# Patient Record
Sex: MALE | Race: WHITE | NOT HISPANIC OR LATINO | Employment: OTHER | URBAN - METROPOLITAN AREA
[De-identification: names, ages, dates, MRNs, and addresses within clinical notes are randomized per-mention and may not be internally consistent; named-entity substitution may affect disease eponyms.]

---

## 2017-03-08 RX ORDER — ALPRAZOLAM 0.25 MG/1
TABLET ORAL
Qty: 90 TAB | Refills: 0 | Status: SHIPPED
Start: 2017-03-08 | End: 2017-09-22 | Stop reason: SDUPTHER

## 2017-03-08 RX ORDER — ZOLPIDEM TARTRATE 10 MG/1
TABLET ORAL
Qty: 90 TAB | Refills: 0 | Status: SHIPPED
Start: 2017-03-08 | End: 2017-09-22 | Stop reason: SDUPTHER

## 2017-03-08 RX ORDER — TRAZODONE HYDROCHLORIDE 50 MG/1
TABLET ORAL
Qty: 90 TAB | Refills: 0 | Status: SHIPPED | OUTPATIENT
Start: 2017-03-08 | End: 2017-09-22 | Stop reason: SDUPTHER

## 2017-04-26 ENCOUNTER — HOSPITAL ENCOUNTER (OUTPATIENT)
Dept: RADIOLOGY | Facility: MEDICAL CENTER | Age: 67
End: 2017-04-26

## 2017-04-26 ENCOUNTER — APPOINTMENT (OUTPATIENT)
Dept: RADIOLOGY | Facility: MEDICAL CENTER | Age: 67
DRG: 183 | End: 2017-04-26
Attending: EMERGENCY MEDICINE
Payer: COMMERCIAL

## 2017-04-26 ENCOUNTER — HOSPITAL ENCOUNTER (INPATIENT)
Facility: MEDICAL CENTER | Age: 67
LOS: 3 days | DRG: 183 | End: 2017-04-29
Attending: EMERGENCY MEDICINE | Admitting: SURGERY
Payer: COMMERCIAL

## 2017-04-26 ENCOUNTER — RESOLUTE PROFESSIONAL BILLING HOSPITAL PROF FEE (OUTPATIENT)
Dept: HOSPITALIST | Facility: MEDICAL CENTER | Age: 67
End: 2017-04-26
Payer: COMMERCIAL

## 2017-04-26 PROBLEM — S22.42XD CLOSED FRACTURE OF MULTIPLE RIBS OF LEFT SIDE WITH ROUTINE HEALING: Status: ACTIVE | Noted: 2017-04-26

## 2017-04-26 PROBLEM — E78.00 HYPERCHOLESTEREMIA: Status: ACTIVE | Noted: 2017-04-26
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
## 2017-04-26 PROBLEM — J94.8 HYDROPNEUMOTHORAX: Status: ACTIVE | Noted: 2017-04-26

## 2017-04-26 PROBLEM — T14.90XA TRAUMA: Status: ACTIVE | Noted: 2017-04-26

## 2017-04-26 LAB
ABO GROUP BLD: NORMAL
ABO GROUP BLD: NORMAL
ALBUMIN SERPL BCP-MCNC: 3.9 G/DL (ref 3.2–4.9)
ALBUMIN/GLOB SERPL: 1.2 G/DL
ALP SERPL-CCNC: 86 U/L (ref 30–99)
ALT SERPL-CCNC: 56 U/L (ref 2–50)
ANION GAP SERPL CALC-SCNC: 8 MMOL/L (ref 0–11.9)
APTT PPP: 26 SEC (ref 24.7–36)
AST SERPL-CCNC: 47 U/L (ref 12–45)
BILIRUB SERPL-MCNC: 1 MG/DL (ref 0.1–1.5)
BLD GP AB SCN SERPL QL: NORMAL
BUN SERPL-MCNC: 11 MG/DL (ref 8–22)
CALCIUM SERPL-MCNC: 8.9 MG/DL (ref 8.5–10.5)
CHLORIDE SERPL-SCNC: 93 MMOL/L (ref 96–112)
CO2 SERPL-SCNC: 22 MMOL/L (ref 20–33)
CREAT SERPL-MCNC: 0.57 MG/DL (ref 0.5–1.4)
ERYTHROCYTE [DISTWIDTH] IN BLOOD BY AUTOMATED COUNT: 47 FL (ref 35.9–50)
ETHANOL BLD-MCNC: 0.01 G/DL
GFR SERPL CREATININE-BSD FRML MDRD: >60 ML/MIN/1.73 M 2
GLOBULIN SER CALC-MCNC: 3.2 G/DL (ref 1.9–3.5)
GLUCOSE BLD-MCNC: 99 MG/DL (ref 65–99)
GLUCOSE SERPL-MCNC: 91 MG/DL (ref 65–99)
HCT VFR BLD AUTO: 42.7 % (ref 42–52)
HGB BLD-MCNC: 15 G/DL (ref 14–18)
INR PPP: 0.86 (ref 0.87–1.13)
MCH RBC QN AUTO: 34.9 PG (ref 27–33)
MCHC RBC AUTO-ENTMCNC: 35.1 G/DL (ref 33.7–35.3)
MCV RBC AUTO: 99.3 FL (ref 81.4–97.8)
PLATELET # BLD AUTO: 373 K/UL (ref 164–446)
PMV BLD AUTO: 8.6 FL (ref 9–12.9)
POTASSIUM SERPL-SCNC: 3.9 MMOL/L (ref 3.6–5.5)
PROT SERPL-MCNC: 7.1 G/DL (ref 6–8.2)
PROTHROMBIN TIME: 12 SEC (ref 12–14.6)
RBC # BLD AUTO: 4.3 M/UL (ref 4.7–6.1)
RH BLD: NORMAL
SODIUM SERPL-SCNC: 123 MMOL/L (ref 135–145)
WBC # BLD AUTO: 8.3 K/UL (ref 4.8–10.8)

## 2017-04-26 PROCEDURE — 71260 CT THORAX DX C+: CPT

## 2017-04-26 PROCEDURE — 71010 DX-CHEST-PORTABLE (1 VIEW): CPT

## 2017-04-26 PROCEDURE — 700102 HCHG RX REV CODE 250 W/ 637 OVERRIDE(OP): Performed by: NURSE PRACTITIONER

## 2017-04-26 PROCEDURE — A9270 NON-COVERED ITEM OR SERVICE: HCPCS | Performed by: NURSE PRACTITIONER

## 2017-04-26 PROCEDURE — 99291 CRITICAL CARE FIRST HOUR: CPT

## 2017-04-26 PROCEDURE — C1729 CATH, DRAINAGE: HCPCS | Performed by: SURGERY

## 2017-04-26 PROCEDURE — G0390 TRAUMA RESPONS W/HOSP CRITI: HCPCS

## 2017-04-26 PROCEDURE — 700111 HCHG RX REV CODE 636 W/ 250 OVERRIDE (IP)

## 2017-04-26 PROCEDURE — 36415 COLL VENOUS BLD VENIPUNCTURE: CPT

## 2017-04-26 PROCEDURE — 85027 COMPLETE CBC AUTOMATED: CPT

## 2017-04-26 PROCEDURE — 80307 DRUG TEST PRSMV CHEM ANLYZR: CPT

## 2017-04-26 PROCEDURE — 302220 CHEST TUBE TRAY: Performed by: SURGERY

## 2017-04-26 PROCEDURE — 86901 BLOOD TYPING SEROLOGIC RH(D): CPT

## 2017-04-26 PROCEDURE — 770022 HCHG ROOM/CARE - ICU (200)

## 2017-04-26 PROCEDURE — 0W9B30Z DRAINAGE OF LEFT PLEURAL CAVITY WITH DRAINAGE DEVICE, PERCUTANEOUS APPROACH: ICD-10-PCS | Performed by: SURGERY

## 2017-04-26 PROCEDURE — 700111 HCHG RX REV CODE 636 W/ 250 OVERRIDE (IP): Performed by: NURSE PRACTITIONER

## 2017-04-26 PROCEDURE — 85730 THROMBOPLASTIN TIME PARTIAL: CPT

## 2017-04-26 PROCEDURE — 86900 BLOOD TYPING SEROLOGIC ABO: CPT

## 2017-04-26 PROCEDURE — 86850 RBC ANTIBODY SCREEN: CPT

## 2017-04-26 PROCEDURE — 700102 HCHG RX REV CODE 250 W/ 637 OVERRIDE(OP): Performed by: SURGERY

## 2017-04-26 PROCEDURE — A9270 NON-COVERED ITEM OR SERVICE: HCPCS | Performed by: SURGERY

## 2017-04-26 PROCEDURE — 85610 PROTHROMBIN TIME: CPT

## 2017-04-26 PROCEDURE — 700117 HCHG RX CONTRAST REV CODE 255: Performed by: EMERGENCY MEDICINE

## 2017-04-26 PROCEDURE — 82962 GLUCOSE BLOOD TEST: CPT

## 2017-04-26 PROCEDURE — 700111 HCHG RX REV CODE 636 W/ 250 OVERRIDE (IP): Performed by: SURGERY

## 2017-04-26 PROCEDURE — 80053 COMPREHEN METABOLIC PANEL: CPT

## 2017-04-26 RX ORDER — SIMVASTATIN 20 MG
40 TABLET ORAL EVERY EVENING
Status: DISCONTINUED | OUTPATIENT
Start: 2017-04-26 | End: 2017-04-26

## 2017-04-26 RX ORDER — MIDAZOLAM HYDROCHLORIDE 1 MG/ML
INJECTION INTRAMUSCULAR; INTRAVENOUS
Status: COMPLETED
Start: 2017-04-26 | End: 2017-04-26

## 2017-04-26 RX ORDER — TRAZODONE HYDROCHLORIDE 50 MG/1
50 TABLET ORAL NIGHTLY
Status: DISCONTINUED | OUTPATIENT
Start: 2017-04-26 | End: 2017-04-29 | Stop reason: HOSPADM

## 2017-04-26 RX ORDER — OXYCODONE HYDROCHLORIDE 10 MG/1
10 TABLET ORAL
Status: DISCONTINUED | OUTPATIENT
Start: 2017-04-26 | End: 2017-04-27

## 2017-04-26 RX ORDER — ALPRAZOLAM 0.25 MG/1
0.25 TABLET ORAL 3 TIMES DAILY PRN
COMMUNITY
End: 2017-09-22

## 2017-04-26 RX ORDER — MIDAZOLAM HYDROCHLORIDE 1 MG/ML
1-5 INJECTION INTRAMUSCULAR; INTRAVENOUS ONCE
Status: COMPLETED | OUTPATIENT
Start: 2017-04-26 | End: 2017-04-26

## 2017-04-26 RX ORDER — MORPHINE SULFATE 4 MG/ML
4 INJECTION, SOLUTION INTRAMUSCULAR; INTRAVENOUS
Status: DISCONTINUED | OUTPATIENT
Start: 2017-04-26 | End: 2017-04-27

## 2017-04-26 RX ORDER — CHLORHEXIDINE GLUCONATE ORAL RINSE 1.2 MG/ML
15 SOLUTION DENTAL EVERY 12 HOURS
Status: DISCONTINUED | OUTPATIENT
Start: 2017-04-26 | End: 2017-04-26

## 2017-04-26 RX ORDER — OXYCODONE HYDROCHLORIDE 5 MG/1
5 TABLET ORAL
Status: DISCONTINUED | OUTPATIENT
Start: 2017-04-26 | End: 2017-04-29 | Stop reason: HOSPADM

## 2017-04-26 RX ORDER — ACETAMINOPHEN 325 MG/1
650 TABLET ORAL EVERY 6 HOURS
Status: DISCONTINUED | OUTPATIENT
Start: 2017-04-26 | End: 2017-04-29 | Stop reason: HOSPADM

## 2017-04-26 RX ORDER — LOVASTATIN 20 MG/1
40 TABLET ORAL
Status: DISCONTINUED | OUTPATIENT
Start: 2017-04-26 | End: 2017-04-29 | Stop reason: HOSPADM

## 2017-04-26 RX ORDER — LOVASTATIN 40 MG/1
40 TABLET ORAL NIGHTLY
COMMUNITY
End: 2017-09-22 | Stop reason: SDUPTHER

## 2017-04-26 RX ORDER — ALPRAZOLAM 0.25 MG/1
0.25 TABLET ORAL 3 TIMES DAILY PRN
Status: DISCONTINUED | OUTPATIENT
Start: 2017-04-26 | End: 2017-04-29 | Stop reason: HOSPADM

## 2017-04-26 RX ORDER — ZOLPIDEM TARTRATE 10 MG/1
10 TABLET ORAL NIGHTLY PRN
COMMUNITY
End: 2017-09-22

## 2017-04-26 RX ORDER — DOCUSATE SODIUM 100 MG/1
100 CAPSULE, LIQUID FILLED ORAL 2 TIMES DAILY
Status: DISCONTINUED | OUTPATIENT
Start: 2017-04-26 | End: 2017-04-29 | Stop reason: HOSPADM

## 2017-04-26 RX ORDER — TRAZODONE HYDROCHLORIDE 50 MG/1
50 TABLET ORAL NIGHTLY
COMMUNITY
End: 2017-09-22

## 2017-04-26 RX ORDER — POLYETHYLENE GLYCOL 3350 17 G/17G
1 POWDER, FOR SOLUTION ORAL 2 TIMES DAILY
Status: DISCONTINUED | OUTPATIENT
Start: 2017-04-26 | End: 2017-04-29 | Stop reason: HOSPADM

## 2017-04-26 RX ORDER — AMOXICILLIN 250 MG
1 CAPSULE ORAL NIGHTLY
Status: DISCONTINUED | OUTPATIENT
Start: 2017-04-26 | End: 2017-04-29 | Stop reason: HOSPADM

## 2017-04-26 RX ORDER — IBUPROFEN 800 MG/1
800 TABLET ORAL
Status: DISCONTINUED | OUTPATIENT
Start: 2017-04-26 | End: 2017-04-29 | Stop reason: HOSPADM

## 2017-04-26 RX ORDER — ENEMA 19; 7 G/133ML; G/133ML
1 ENEMA RECTAL
Status: DISCONTINUED | OUTPATIENT
Start: 2017-04-26 | End: 2017-04-29 | Stop reason: HOSPADM

## 2017-04-26 RX ORDER — ZOLPIDEM TARTRATE 5 MG/1
10 TABLET ORAL NIGHTLY PRN
Status: DISCONTINUED | OUTPATIENT
Start: 2017-04-26 | End: 2017-04-29 | Stop reason: HOSPADM

## 2017-04-26 RX ORDER — BISACODYL 10 MG
10 SUPPOSITORY, RECTAL RECTAL
Status: DISCONTINUED | OUTPATIENT
Start: 2017-04-26 | End: 2017-04-29 | Stop reason: HOSPADM

## 2017-04-26 RX ORDER — LOVASTATIN 20 MG/1
40 TABLET ORAL NIGHTLY
COMMUNITY
End: 2017-04-26

## 2017-04-26 RX ORDER — AMOXICILLIN 250 MG
1 CAPSULE ORAL
Status: DISCONTINUED | OUTPATIENT
Start: 2017-04-26 | End: 2017-04-29 | Stop reason: HOSPADM

## 2017-04-26 RX ORDER — ONDANSETRON 2 MG/ML
4 INJECTION INTRAMUSCULAR; INTRAVENOUS EVERY 4 HOURS PRN
Status: DISCONTINUED | OUTPATIENT
Start: 2017-04-26 | End: 2017-04-29 | Stop reason: HOSPADM

## 2017-04-26 RX ADMIN — ACETAMINOPHEN 650 MG: 325 TABLET, FILM COATED ORAL at 23:58

## 2017-04-26 RX ADMIN — MIDAZOLAM 2 MG: 1 INJECTION INTRAMUSCULAR; INTRAVENOUS at 13:54

## 2017-04-26 RX ADMIN — MIDAZOLAM: 1 INJECTION INTRAMUSCULAR; INTRAVENOUS at 14:44

## 2017-04-26 RX ADMIN — FENTANYL CITRATE 75 MCG: 50 INJECTION INTRAMUSCULAR; INTRAVENOUS at 14:07

## 2017-04-26 RX ADMIN — ENOXAPARIN SODIUM 30 MG: 100 INJECTION SUBCUTANEOUS at 15:44

## 2017-04-26 RX ADMIN — IOHEXOL 100 ML: 350 INJECTION, SOLUTION INTRAVENOUS at 12:56

## 2017-04-26 RX ADMIN — STANDARDIZED SENNA CONCENTRATE AND DOCUSATE SODIUM 1 TABLET: 8.6; 5 TABLET, FILM COATED ORAL at 20:22

## 2017-04-26 RX ADMIN — ACETAMINOPHEN 650 MG: 325 TABLET, FILM COATED ORAL at 17:31

## 2017-04-26 RX ADMIN — MIDAZOLAM HYDROCHLORIDE: 1 INJECTION, SOLUTION INTRAMUSCULAR; INTRAVENOUS at 14:43

## 2017-04-26 RX ADMIN — MIDAZOLAM 3 MG: 1 INJECTION INTRAMUSCULAR; INTRAVENOUS at 14:01

## 2017-04-26 RX ADMIN — TRAZODONE HYDROCHLORIDE 50 MG: 50 TABLET ORAL at 20:22

## 2017-04-26 RX ADMIN — ACETAMINOPHEN 650 MG: 325 TABLET, FILM COATED ORAL at 15:38

## 2017-04-26 RX ADMIN — MIDAZOLAM 1 MG: 1 INJECTION INTRAMUSCULAR; INTRAVENOUS at 14:06

## 2017-04-26 RX ADMIN — FENTANYL CITRATE 25 MCG: 50 INJECTION INTRAMUSCULAR; INTRAVENOUS at 13:59

## 2017-04-26 RX ADMIN — FENTANYL CITRATE: 50 INJECTION, SOLUTION INTRAMUSCULAR; INTRAVENOUS at 14:00

## 2017-04-26 RX ADMIN — LOVASTATIN 40 MG: 20 TABLET ORAL at 20:23

## 2017-04-26 RX ADMIN — IBUPROFEN 800 MG: 800 TABLET, FILM COATED ORAL at 17:31

## 2017-04-26 ASSESSMENT — LIFESTYLE VARIABLES
TOTAL SCORE: 4
TOTAL SCORE: 4
EVER_SMOKED: NEVER
ON A TYPICAL DAY WHEN YOU DRINK ALCOHOL HOW MANY DRINKS DO YOU HAVE: 3
EVER HAD A DRINK FIRST THING IN THE MORNING TO STEADY YOUR NERVES TO GET RID OF A HANGOVER: YES
EVER FELT BAD OR GUILTY ABOUT YOUR DRINKING: YES
HOW MANY TIMES IN THE PAST YEAR HAVE YOU HAD 5 OR MORE DRINKS IN A DAY: 10
TOTAL SCORE: 4
EVER_SMOKED: NEVER
DOES PATIENT WANT TO TALK TO SOMEONE ABOUT QUITTING: NO
ALCOHOL_USE: YES
HAVE YOU EVER FELT YOU SHOULD CUT DOWN ON YOUR DRINKING: YES
HAVE PEOPLE ANNOYED YOU BY CRITICIZING YOUR DRINKING: YES
AVERAGE NUMBER OF DAYS PER WEEK YOU HAVE A DRINK CONTAINING ALCOHOL: 7
CONSUMPTION TOTAL: POSITIVE
DOES PATIENT WANT TO STOP DRINKING: YES

## 2017-04-26 ASSESSMENT — PAIN SCALES - GENERAL
PAINLEVEL_OUTOF10: 5
PAINLEVEL_OUTOF10: 0
PAINLEVEL_OUTOF10: 2
PAINLEVEL_OUTOF10: 5
PAINLEVEL_OUTOF10: 2
PAINLEVEL_OUTOF10: 5
PAINLEVEL_OUTOF10: 4
PAINLEVEL_OUTOF10: 5

## 2017-04-26 ASSESSMENT — COPD QUESTIONNAIRES
HAVE YOU SMOKED AT LEAST 100 CIGARETTES IN YOUR ENTIRE LIFE: NO/DON'T KNOW
DO YOU EVER COUGH UP ANY MUCUS OR PHLEGM?: NO/ONLY WITH OCCASIONAL COLDS OR INFECTIONS
DURING THE PAST 4 WEEKS HOW MUCH DID YOU FEEL SHORT OF BREATH: NONE/LITTLE OF THE TIME
COPD SCREENING SCORE: 2

## 2017-04-26 NOTE — CARE PLAN
Problem: Infection  Goal: Will remain free from infection  Intervention: Assess for removal of potential routes of infection, such as IV, central line, intra-arterial or urinary catheters  Patient assessed for removal of invasive equipment. All lines currently necessary, will continue to reassess for potential removal of invasive lines      Problem: Venous Thromboembolism (VTW)/Deep Vein Thrombosis (DVT) Prevention:  Goal: Patient will participate in Venous Thrombosis (VTE)/Deep Vein Thrombosis (DVT)Prevention Measures  Intervention: Ensure patient wears graduated elastic stockings (RANDI hose) and/or SCDs, if ordered, when in bed or chair (Remove at least once per shift for skin check)  Patient educated on VTE prophylaxis while in the ICU. Ensured SCD's applied properly, medicated per MAR and will continue to monitor for venous stasis complications

## 2017-04-26 NOTE — PROGRESS NOTES
Patient picked up from ER with CCT and ACLS RN. On 6L O2 via NC, no complaints of pain A&Ox4. Patient assessed, consents obtained for chest tube placement

## 2017-04-26 NOTE — IP AVS SNAPSHOT
4/29/2017    Mauricio Obando  [address] [facility] [address]    Dear Mauricio:    [facility] wants to ensure your discharge home is safe and you or your loved ones have had all of your questions answered regarding your care after you leave the hospital.    Below is a list of resources and contact information should you have any questions regarding your hospital stay, follow-up instructions, or active medical symptoms.    Questions or Concerns Regarding… Contact   Medical Questions Related to Your Discharge  (7 days a week, 8am-5pm) Contact a Nurse Care Coordinator   [phone]   Medical Questions Not Related to Your Discharge  (24 hours a day / 7 days a week)  Contact the Nurse Health Line   [phone]    Medications or Discharge Instructions Refer to your discharge packet   or contact your [facility] Primary Care Provider   [phone]   Follow-up Appointment(s) Schedule your appointment via [facility]   or contact Scheduling [phone]   Billing Review your statement via [facility]  or contact Billing [phone]   Medical Records Review your records via [facility]   or contact Medical Records [phone]     You may receive a telephone call within two days of discharge. This call is to make certain you understand your discharge instructions and have the opportunity to have any questions answered. You can also easily access your medical information, test results and upcoming appointments via the [facility] free online health management tool. You can learn more and sign up at [employer]. For assistance setting up your [facility] account, please call [phone].    Once again, we want to ensure your discharge home is safe and that you have a clear understanding of any next steps in your care. If you have any questions or concerns, please do not hesitate to contact us, we are here for you. Thank you for choosing [facility] for your healthcare needs.    Sincerely,    Your [facility] Healthcare Team

## 2017-04-26 NOTE — ED NOTES
Pt bib ems from [place].  Chief Complaint   Patient presents with   • Trauma Green     Fall in the shower 4/22/17, CC left flank pain, left sided rib pain     Transferred for hydropnuemo and rib fractures. Pt able to transfer independently.  Trauma eval complete.   Pt to CT.

## 2017-04-26 NOTE — IP AVS SNAPSHOT
CAD Best Access Code: [email or web address]  Expires: 5/29/2017  5:45 PM    Your email address is not on file at [facility].  Email Addresses are required for you to sign up for CAD Best, please contact [phone] to verify your personal information and to provide your email address prior to attempting to register for CAD Best.    Mauricio Obando  [address] [place] [address], [place] [address]    CAD Best  A secure, online tool to manage your health information     [facility]’s CAD Best® is a secure, online tool that connects you to your personalized health information from the privacy of your home -- day or night - making it very easy for you to manage your healthcare. Once the activation process is completed, you can even access your medical information using the CAD Best swetha, which is available for free in the Apple Swetha store or Google Play store.     To learn more about CAD Best, visit [email or web address] Best    There are two levels of access available (as shown below):   My Chart Features  [facility] Primary Care Doctor [facility]  Specialists [facility]  Urgent  Care Non-[facility] Primary Care Doctor   Email your healthcare team securely and privately 24/7 X X X    Manage appointments: schedule your next appointment; view details of past/upcoming appointments X      Request prescription refills. X      View recent personal medical records, including lab and immunizations X X X X   View health record, including health history, allergies, medications X X X X   Read reports about your outpatient visits, procedures, consult and ER notes X X X X   See your discharge summary, which is a recap of your hospital and/or ER visit that includes your diagnosis, lab results, and care plan X X  X     How to register for CAD Best:  Once your e-mail address has been verified, follow the following steps to sign up for CAD Best.     1. Go to  [email or web address]  2. Click on the Sign Up Now box, which takes you to the New Member Sign Up page. You will  need to provide the following information:  a. Enter your t-Art Access Code exactly as it appears at the top of this page. (You will not need to use this code after you’ve completed the sign-up process. If you do not sign up before the expiration date, you must request a new code.)   b. Enter your date of birth.   c. Enter your home email address.   d. Click Submit, and follow the next screen’s instructions.  3. Create a [employer] ID. This will be your t-Art login ID and cannot be changed, so think of one that is secure and easy to remember.  4. Create a t-Art password. You can change your password at any time.  5. Enter your Password Reset Question and Answer. This can be used at a later time if you forget your password.   6. Enter your e-mail address. This allows you to receive e-mail notifications when new information is available in t-Art.  7. Click Sign Up. You can now view your health information.    For assistance activating your t-Art account, call [phone]

## 2017-04-26 NOTE — IP AVS SNAPSHOT
" Home Care Instructions                                                                                                                  Name:Mauricio Obando  Medical Record Number:[id number]  CSN: [id number]    YOB: 1950   Age: 66 y.o.  Sex: male  HT:1.778 m (5' 10\") WT: 74.5 kg (164 lb 3.9 oz)          Admit Date: 4/26/2017     Discharge Date:   Today's Date: 4/29/2017  Attending Doctor:  Wisam Munguia M.D.                  Allergies:  Review of patient's allergies indicates no known allergies.            Discharge Instructions       Discharge Instructions    Discharged to home by car with relative. Discharged via wheelchair, hospital escort: Yes.  Special equipment needed: Not Applicable    Be sure to schedule a follow-up appointment with your primary care doctor or any specialists as instructed.     Discharge Plan:   Diet Plan: Discussed  Activity Level: Discussed  Confirmed Follow up Appointment: Patient to Call and Schedule Appointment  Confirmed Symptoms Management: Discussed  Medication Reconciliation Updated: Yes  Influenza Vaccine Indication: Not indicated: Previously immunized this influenza season and > 8 years of age    I understand that a diet low in cholesterol, fat, and sodium is recommended for good health. Unless I have been given specific instructions below for another diet, I accept this instruction as my diet prescription.   Other diet:     Special Instructions: None    · Is patient discharged on Warfarin / Coumadin?   No     · Is patient Post Blood Transfusion?      The purpose of blood transfusion is to correct anemia, low levels of blood clotting factors or to correct acute blood loss.   Blood transfusion is very safe but occasionally unexpected adverse reactions do occur. Most adverse reactions occur during transfusion, within one to two days following transfusion or one to two weeks following transfusion. Some adverse reactions can occur one to six months after transfusion and some even " years later.             If any of the symptoms listed below happen to you during your transfusion,                                 please notify your nurse immediately.   · Fever or Chills  · Flushing of the Face  · Hives, rash or itching  · Difficulty in breathing or shortness of breath  · Pain or oozing of blood from the IV needle site  · Low back pain  · Nausea or vomiting  · Weakness or fainting  · Chest pain  · Blood in the urine  · Decreased frequency of urination    The above symptoms may also occur within 24 to 48 after transfusion; if so, notify your physician.     · Yellowing of the skin    This can happen one to six months after transfusion; if so, notify your physician    Depression / Suicide Risk    As you are discharged from this [facility] facility, it is important to learn how to keep safe from harming yourself.    Recognize the warning signs:  · Abrupt changes in personality, positive or negative- including increase in energy   · Giving away possessions  · Change in eating patterns- significant weight changes-  positive or negative  · Change in sleeping patterns- unable to sleep or sleeping all the time   · Unwillingness or inability to communicate  · Depression  · Unusual sadness, discouragement and loneliness  · Talk of wanting to die  · Neglect of personal appearance   · Rebelliousness- reckless behavior  · Withdrawal from people/activities they love  · Confusion- inability to concentrate     If you or a loved one observes any of these behaviors or has concerns about self-harm, here's what you can do:  · Talk about it- your feelings and reasons for harming yourself  · Remove any means that you might use to hurt yourself (examples: pills, rope, extension cords, firearm)  · Get professional help from the community (Mental Health, Substance Abuse, psychological counseling)  · Do not be alone:Call your Safe Contact- someone whom you trust who will be there for you.  · Call your local CRISIS  HOTLINE 629-0140 or 379-257-5791  · Call your local Children's Mobile Crisis Response Team [place] (037) 666-7059 or www.[employer]link[employer]  · Call the toll free National Suicide Prevention Hotlines   · National Suicide Prevention Lifeline 435-916-OIJO (1875)  · National Hope Line Network 800-SUICIDE (019-8513)        Follow-up Information     1. Follow up with Wisam Munguia M.D.. Schedule an appointment as soon as possible for a visit in 1 week.    Specialty:  Surgery    Contact information    [address]  [place] [address] [phone]          2. Follow up with Javon Palacios M.D.. Schedule an appointment as soon as possible for a visit in 1 week.    Specialty:  Internal Medicine    Contact information    [address]  [place] [id number] [phone]           Discharge Medication Instructions:    Below are the medications your physician expects you to take upon discharge:    Review all your home medications and newly ordered medications with your doctor and/or pharmacist. Follow medication instructions as directed by your doctor and/or pharmacist.    Please keep your medication list with you and share with your physician.               Medication List      START taking these medications        Instructions    Morning Afternoon Evening Bedtime    acetaminophen 325 MG Tabs   Last time this was given:  650 mg on 4/29/2017  5:06 PM   Commonly known as:  TYLENOL        Take 2 Tabs by mouth every 6 hours.   Dose:  650 mg                        ibuprofen 800 MG Tabs   Last time this was given:  800 mg on 4/29/2017  5:06 PM   Commonly known as:  MOTRIN        Take 1 Tab by mouth 3 times a day, with meals.   Dose:  800 mg                          CONTINUE taking these medications        Instructions    Morning Afternoon Evening Bedtime    alprazolam 0.25 MG Tabs   Commonly known as:  XANAX        Take 0.25 mg by mouth 3 times a day as needed for Sleep or Anxiety.   Dose:  0.25 mg                           AMBIEN 10 MG Tabs   Generic drug:  zolpidem        Take 10 mg by mouth at bedtime as needed for Sleep.   Dose:  10 mg                        lovastatin 40 MG tablet   Last time this was given:  40 mg on 4/28/2017  8:58 PM   Commonly known as:  MEVACOR        Take 40 mg by mouth every evening.   Dose:  40 mg                        trazodone 50 MG Tabs   Last time this was given:  50 mg on 4/28/2017  8:58 PM   Commonly known as:  DESYREL        Take 50 mg by mouth every evening.   Dose:  50 mg                                Instructions           Diet / Nutrition:    Follow any diet instructions given to you by your doctor or the dietician, including how much salt (sodium) you are allowed each day.    If you are overweight, talk to your doctor about a weight reduction plan.    Activity:    Remain physically active following your doctor's instructions about exercise and activity.    Rest often.     Any time you become even a little tired or short of breath, SIT DOWN and rest.    Worsening Symptoms:    Report any of the following signs and symptoms to the doctor's office immediately:    *Pain of jaw, arm, or neck  *Chest pain not relieved by medication                               *Dizziness or loss of consciousness  *Difficulty breathing even when at rest   *More tired than usual                                       *Bleeding drainage or swelling of surgical site  *Swelling of feet, ankles, legs or stomach                 *Fever (>100ºF)  *Pink or blood tinged sputum  *Weight gain (3lbs/day or 5lbs /week)           *Shock from internal defibrillator (if applicable)  *Palpitations or irregular heartbeats                *Cool and/or numb extremities    Stroke Awareness    Common Risk Factors for Stroke include:    Age  Atrial Fibrillation  Carotid Artery Stenosis  Diabetes Mellitus  Excessive alcohol consumption  High blood pressure  Overweight   Physical inactivity  Smoking    Warning signs and symptoms of a  stroke include:    *Sudden numbness or weakness of the face, arm or leg (especially on one side of the body).  *Sudden confusion, trouble speaking or understanding.  *Sudden trouble seeing in one or both eyes.  *Sudden trouble walking, dizziness, loss of balance or coordination.Sudden severe headache with no known cause.    It is very important to get treatment quickly when a stroke occurs. If you experience any of the above warning signs, call 911 immediately.                   Disclaimer         Quit Smoking / Tobacco Use:    I understand the use of any tobacco products increases my chance of suffering from future heart disease or stroke and could cause other illnesses which may shorten my life. Quitting the use of tobacco products is the single most important thing I can do to improve my health. For further information on smoking / tobacco cessation call a Toll Free Quit Line at 1-380.823.4891 (*National Cancer [place]) or 1-575.181.6833 (American Lung Association) or you can access the web based program at www.lungDucatt.org.    [place] Tobacco Users Help Line:  (286) 800-1835       Toll Free: 1-672.911.3291  Quit Tobacco Program [facility] (895)334-0877    Crisis Hotline:    [place] Crisis Hotline:  8-846-PDUBIGB or 1-605.974.6902    [place] Crisis Hotline:    1-399.183.7241 or 421-824-5670    Discharge Survey:   Thank you for choosing [facility]. We hope we did everything we could to make your hospital stay a pleasant one. You may be receiving a phone survey and we would appreciate your time and participation in answering the questions. Your input is very valuable to us in our efforts to improve our service to our patients and their families.        My signature on this form indicates that:    1. I have reviewed and understand the above information.  2. My questions regarding this information have been answered to my satisfaction.  3. I have formulated a plan with my discharge nurse to  obtain my prescribed medications for home.                  Disclaimer         __________________________________                     __________       ________                       Patient Signature                                                 Date                    Time

## 2017-04-26 NOTE — IP AVS SNAPSHOT
" <p align=\"LEFT\"><IMG SRC=\"//[email or web address]$/[email or web address]\" alt=\"Image\" WIDTH=\"50%\" HEIGHT=\"200\" BORDER=\"\"></p>                   Name:Mauricio Obando  Medical Record Number:[id number]  CSN: [id number]    YOB: 1950   Age: 66 y.o.  Sex: male  HT:1.778 m (5' 10\") WT: 74.5 kg (164 lb 3.9 oz)          Admit Date: 4/26/2017     Discharge Date:   Today's Date: 4/29/2017  Attending Doctor:  Wisam Munguia M.D.                  Allergies:  Review of patient's allergies indicates no known allergies.          Follow-up Information     1. Follow up with Wisam Munguia M.D.. Schedule an appointment as soon as possible for a visit in 1 week.    Specialty:  Surgery    Contact information    [address]  [address]  [place] [address] [phone]          2. Follow up with Javon Palacios M.D.. Schedule an appointment as soon as possible for a visit in 1 week.    Specialty:  Internal Medicine    Contact information    [address]  [place] [address] [phone]           Medication List      Take these Medications        Instructions    acetaminophen 325 MG Tabs   Commonly known as:  TYLENOL    Take 2 Tabs by mouth every 6 hours.   Dose:  650 mg       alprazolam 0.25 MG Tabs   Commonly known as:  XANAX    Take 0.25 mg by mouth 3 times a day as needed for Sleep or Anxiety.   Dose:  0.25 mg       AMBIEN 10 MG Tabs   Generic drug:  zolpidem    Take 10 mg by mouth at bedtime as needed for Sleep.   Dose:  10 mg       ibuprofen 800 MG Tabs   Commonly known as:  MOTRIN    Take 1 Tab by mouth 3 times a day, with meals.   Dose:  800 mg       lovastatin 40 MG tablet   Commonly known as:  MEVACOR    Take 40 mg by mouth every evening.   Dose:  40 mg       trazodone 50 MG Tabs   Commonly known as:  DESYREL    Take 50 mg by mouth every evening.   Dose:  50 mg         "

## 2017-04-26 NOTE — ED NOTES
Chief Complaint   Patient presents with   • Trauma Green     Fall in the shower 4/22/17, CC left flank pain, left sided rib pain     BIB [facility]. Pt AAO x4 on arrival. Pt denies pain at rest. Left flank bruising with rib pain. XR completed in trauma, to CT scan on 6L mask.

## 2017-04-26 NOTE — ED NOTES
Med rec complete per patient and calling Darci   Allergies reviewed  No ORAL antibiotics in last 30 days

## 2017-04-26 NOTE — H&P
"TRAUMA HISTORY AND PHYSICAL    CHIEF COMPLAINT: Blunt chest trauma, pneumothorax.     HISTORY OF PRESENT ILLNESS: The patient, Mauricio Obando, is a 66-year-old gentleman who fell in shower several days ago. He struck his left side and noted immediate left posterior chest wall pain. He denies any loss of consciousness. His pain intensified over the ensuing days prompting him to seek evaluation earlier today. CT imaging from the referring facility demonstrated a partial left-sided pneumothorax and multiple posterior rib fractures. He was transported to [facility] in [place], [place] for a definitive trauma evaluation.  The patient was triaged as a Trauma Green Transfer in accordance with established pre hospital protocols. An expeditious primary and secondary survey with required adjuncts was conducted. See Trauma Narrator for full details.    PAST MEDICAL HISTORY:  has no past medical history on file.     PAST SURGICAL HISTORY:  has no past surgical history on file.     ALLERGIES: NKMA.     CURRENT MEDICATIONS:    Home Medications     Reviewed by Rose Ruiz (Pharmacy Tech) on 04/26/17 at 1318  Med List Status: Complete    Medication Last Dose Status    alprazolam (XANAX) 0.25 MG Tab 4/25/2017 Active    lovastatin (MEVACOR) 40 MG tablet 4/25/2017 Active    trazodone (DESYREL) 50 MG Tab 4/25/2017 Active    zolpidem (AMBIEN) 10 MG Tab 4/25/2017 Active              FAMILY HISTORY: family history is not on file.    SOCIAL HISTORY:  reports that he has never smoked. He does not have any smokeless tobacco history on file. He reports that he drinks alcohol. He reports that he does not use illicit drugs.    REVIEW OF SYSTEMS: Unable to be elicited secondary to the acuity of the patient's condition.    PHYSICAL EXAMINATION:     CONSTITUTIONAL:     Vital Signs: Blood pressure 163/102, pulse 103, temperature 36.9 °C (98.5 °F), resp. rate 21, height 1.778 m (5' 10\"), weight 74.5 kg (164 lb 3.9 oz), SpO2 " 91 %.   General Appearance: appears stated age, is in mild distress.  HEENT:    No significant external craniofacial trauma. Pupils equal, regular, react to light and accommodation. Extraocular muscles intact. Ear canals and tympanic membranes are normal. Lips, mouth, and throat are unremarkable. The nares and oropharynx are clear. The midface and jaw are stable. No malocclusion is evident.  NECK:    The cervical spine is supple and nontender. Normal range of motion. The trachea is midline. There is no jugulovenous distention or cervical crepitance.   RESPIRATORY:   Inspection: unlabored respirations, no intercostal retractions or accessory muscle use.   Palpation:  tender to compression on the left posteriorly. The clavicles are nondeformed.   Auscultation: clear to auscultation.  CARDIOVASCULAR:   Auscultation: regular rate and rhythm.   Peripheral Pulses: Normal.   ABDOMEN: Abdomen is soft, nontender, without organomegaly or masses.  GENITOURINARY:   (MALE): normal male external genitalia.  MUSCULOSKELETAL:   No significant angulation deformity or soft tissue injury. The pelvis is stable.    Inspection of back is normal, no tenderness noted.  SKIN:    No cyanosis or pallor.  NEUROLOGIC:    GCS 15. no focal deficits noted, mental status intact, cranial nerves II through XII intact, muscle tone normal, muscle strength normal, sensation is intact.  PSYCHIATRIC:   Does not appear depressed or anxious, oriented to time, place, person, short and long term memory appears intact, judgement and insight appear intact.    LABORATORY VALUES:   Recent Labs      04/26/17   1235   WBC  8.3   RBC  4.30*   HEMOGLOBIN  15.0   HEMATOCRIT  42.7   MCV  99.3*   MCH  34.9*   MCHC  35.1   RDW  47.0   PLATELETCT  373   MPV  8.6*     Recent Labs      04/26/17   1235   SODIUM  123*   POTASSIUM  3.9   CHLORIDE  93*   CO2  22   GLUCOSE  91   BUN  11   CREATININE  0.57   CALCIUM  8.9     Recent Labs      04/26/17   1235   ASTSGOT  47*    ALTSGPT  56*   TBILIRUBIN  1.0   ALKPHOSPHAT  86   GLOBULIN  3.2   INR  0.86*     Recent Labs      04/26/17   1235   APTT  26.0   INR  0.86*        IMAGING:   CT-CHEST,ABDOMEN,PELVIS WITH   Final Result         1. Large left hydropneumothorax with collapsing of the left upper lobe is redemonstrated.      2. Mildly displaced fractures of the left lateral 7th and 8th ribs. Comminuted displaced fractures of the left posterior 8th, 9th, 10th, 11th ribs.      3. Left basilar airspace opacity with air bronchogram could be atelectasis or contusion.      DX-CHEST-PORTABLE (1 VIEW)   Final Result         1. Acute left rib fractures. Large left pneumothorax with collapsing of the left upper lobe.      2. Patchy opacity in the left lung base could relate to atelectasis or contusion.      CRITICAL RESULT : Preliminary findings is known by Dr. HELEN BLANCO in the Emergency Department on 4/26/2017 12:42 PM          IMPRESSION AND PLAN:     Active Hospital Problems    Diagnosis   • Closed fracture of multiple ribs of left side with routine healing [S22.42XD]     Priority: High     Left segmental 8th through 9th rib fractures. Posterior 10th and 11th rib fractures.    Aggressive multimodal pain management and pulmonary hygiene. Serial chest radiographs.        • Hydropneumothorax [J94.8]     Priority: High     Large left hemopneumothorax with partial collapse of the left upper lobe.  Left tube thoracostomy on admission.  Continue chest tube to close suction drainage.     • Hypercholesteremia [E78.00]     Priority: Medium     Chronic condition treated with lovastatin.  Resumed maintenance medication on admission.       • Fall from ground level [W18.30XA]     Priority: Low     Fall in shower on 4 days prior to admission. No loss of consciousness.          DISPOSITION:  Trauma ICU.    ____________________________________   MD JOEY LOPEZ / NTS     DD: 4/26/2017   DT: 12:47 PM

## 2017-04-26 NOTE — DISCHARGE PLANNING
Trauma Green    Spouse arrived for pt. Spouse is Ana Maria Obando, ([phone]). Escorted spouse to SICU waiting area and updated RN of her arrival. Pt was also updated that spouse has arrived and is doing well. Spouse stated she has support from family and will contact them.

## 2017-04-26 NOTE — ED PROVIDER NOTES
ED Provider Note    Scribed for No att. providers found by Miky Sue. 4/26/2017  12:29 PM    Primary care provider: No primary care provider on file.  Means of arrival: EMS  History obtained from: Patient  History limited by: None    CHIEF COMPLAINT  Chief Complaint   Patient presents with   • Trauma Green     Fall in the shower 4/22/17, CC left flank pain, left sided rib pain       [facility] [place]  Snack Mara-Two is a 89 y.o. unknown who presents to the Emergency Department as a trauma green transfer with shortness of breath secondary to rib pain status post mechanical ground level fall 4 days ago. He states that movement worsens his pain. Patient was evaluated at [facility] where he was found to have multiple rib fractures. EMS reports that the patient fell in the shower, impacting his left side on Saturday. En route the patient had diminished lung sounds on the left side and was saturating at 93% on 3L of oxygen. Patient is prescribed Lovastatin 40 mg, Xanax, Trazodone 50 mg, Ambien. He denies chest pain, tobacco use, drug use.    REVIEW OF SYSTEMS  Pertinent negatives include no chest pain. As above, all other systems reviewed and are negative.   See HPI for further details.   C.    PAST MEDICAL HISTORY   None noted    SURGICAL HISTORY  patient denies any surgical history    SOCIAL HISTORY  Social History   Substance Use Topics   • Smoking status: Never Smoker    • Smokeless tobacco: None   • Alcohol Use: Yes      History   Drug Use No       FAMILY HISTORY  History reviewed. No pertinent family history.    CURRENT MEDICATIONS  No current facility-administered medications for this encounter.    Current outpatient prescriptions:   •  zolpidem (AMBIEN) 10 MG Tab, Take 10 mg by mouth at bedtime as needed for Sleep., Disp: , Rfl:   •  lovastatin (MEVACOR) 40 MG tablet, Take 40 mg by mouth every evening., Disp: , Rfl:   •  trazodone (DESYREL) 50 MG Tab, Take 50 mg by mouth every evening., Disp: , Rfl:   •  alprazolam  "(XANAX) 0.25 MG Tab, Take 0.25 mg by mouth 3 times a day as needed for Sleep or Anxiety., Disp: , Rfl:     ALLERGIES  No Known Allergies    PHYSICAL EXAM  VITAL SIGNS: /102 mmHg  Pulse 98  Temp(Src) 37.1 °C (98.8 °F)  Resp 19  Ht 1.778 m (5' 10\")  Wt 74.844 kg (165 lb)  BMI 23.68 kg/m2    Constitutional: Well developed, Well nourished, No acute distress, Non-toxic appearance.   HENT: Normocephalic, Atraumatic, Bilateral external ears normal, Oropharynx is clear mucous membranes are moist. No oral exudates or nasal discharge.   Eyes: Pupils are equal round and reactive, EOMI, Conjunctiva normal, No discharge.   Neck: Normal range of motion, No tenderness, Supple, No stridor. No meningismus.  Lymphatic: No lymphadenopathy noted.   Cardiovascular: Regular rate and rhythm without murmur rub or gallop.  Thorax & Lungs: Clear breath sounds bilaterally without wheezes, rhonchi or rales. Left lateral chest wall tenderness T5-T3. Left flank ecchymosis  Abdomen: Soft non-tender non-distended. There is no rebound or guarding. No organomegaly is appreciated. Bowel sounds are normal. Left flank ecchymosis  Skin: Normal without rash. Left flank ecchymosis  Back: No CVA or spinal tenderness.   Extremities: Intact distal pulses, No edema, No tenderness, No cyanosis, No clubbing. Capillary refill is less than 2 seconds.  Musculoskeletal: Good range of motion in all major joints. No tenderness to palpation or major deformities noted.   Neurologic: Alert & oriented x 3, Normal motor function, Normal sensory function, No focal deficits noted. Reflexes are normal.  Psychiatric: Affect normal, Judgment normal, Mood normal. There is no suicidal ideation or patient reported hallucinations.       DIAGNOSTIC STUDIES / PROCEDURES    LABS  Labs Reviewed   DIAGNOSTIC ALCOHOL - Abnormal; Notable for the following:     Diagnostic Alcohol 0.01 (*)     All other components within normal limits   CBC WITHOUT DIFFERENTIAL - Abnormal; " Notable for the following:     RBC 4.30 (*)     MCV 99.3 (*)     MCH 34.9 (*)     MPV 8.6 (*)     All other components within normal limits   COMP METABOLIC PANEL - Abnormal; Notable for the following:     Sodium 123 (*)     Chloride 93 (*)     AST(SGOT) 47 (*)     ALT(SGPT) 56 (*)     All other components within normal limits   PROTHROMBIN TIME - Abnormal; Notable for the following:     INR 0.86 (*)     All other components within normal limits   COD (ADULT)   COMPONENT CELLULAR   APTT   ABO CONFIRMATION   ESTIMATED GFR   All labs reviewed by me.    RADIOLOGY  CT-CHEST,ABDOMEN,PELVIS WITH   Final Result         1. Large left hydropneumothorax with collapsing of the left upper lobe is redemonstrated.      2. Mildly displaced fractures of the left lateral 7th and 8th ribs. Comminuted displaced fractures of the left posterior 8th, 9th, 10th, 11th ribs.      3. Left basilar airspace opacity with air bronchogram could be atelectasis or contusion.      DX-CHEST-PORTABLE (1 VIEW)   Final Result         1. Acute left rib fractures. Large left pneumothorax with collapsing of the left upper lobe.      2. Patchy opacity in the left lung base could relate to atelectasis or contusion.      CRITICAL RESULT : Preliminary findings is known by Dr. HELEN BLANCO in the Emergency Department on 4/26/2017 12:42 PM      The radiologist's interpretation of all radiological studies have been reviewed by me.    COURSE & MEDICAL DECISION MAKING  Nursing notes, VS, PMSFHx reviewed in chart.    12:29 PM Patient seen and examined at bedside. Discussed the patient's condition and pneumothoraces. Informed him that he will be kept in the hospital for treatment. Patient declines pain medication at this time. Ordered for Xd chest, CT chest/abdomen/pelvis, Component cellular, Diagnostic alcohol, CBC with differential, CMP, Prothrombin time, APTT, COD, ABO confirmation to evaluate. The patient was hypoxic off oxygen and splinting and I had a high  suspicion of significant rib fractures on the left side and possible spleen injury and therefore ordered an scan to rule out life-threatening hemorrhage.    12:35 PM - Consulted with Dr. Munguia (trauma surgeon) who will consult on the patient's case and determine whether or not to place the tube in the ED or in the trauma ICU. The patient's diagnostic alcohol is 0.01. CBC shows no evidence of anemia or leukocytosis. Of concern he has a low sodium at 123. Serum electrolytes are otherwise unremarkable with only mild elevation of AST and ALT. INR is unremarkable    Patient received normal saline wide open in the emergency department. In addition I contacted Dr. Munguia regarding his need for chest tube placement and this was done on a semi-emergent basis in the trauma ICU to relieve his traumatic pneumothorax. Given his flail chest he was in quite a bit of pain but did not require pain medication in the emergency department when he was not moving and was comfortable not receiving pain medication.    Patient has had high blood pressure while in the emergency department, felt likely secondary to medical condition. Counseled patient to monitor blood pressure at home and follow up with primary care physician.      Please note critical care time of 30 minutes was spent in the care of this patient outside of procedure time    DISPOSITION:  Patient will be discharged home in stable condition.    FINAL IMPRESSION  1. Traumatic fall  2. Left pneumothorax  3. Multiple left-sided rib fractures.  4. Hyponatremia  5. Critical care time, 30 minutes       IMiky (Karis), am scribing for, and in the presence of, No att. providers found.    Electronically signed by: Miky Sue (Karis), 4/26/2017    I, No att. providers found personally performed the services described in this documentation, as scribed by Miky Sue in my presence, and it is both accurate and complete.    The note accurately reflects work and  decisions made by me.  Clem Cheng  4/26/2017  6:34 PM

## 2017-04-26 NOTE — PROGRESS NOTES
Dr. Munguia at bedside to place chest tube, medicated per order with versed and fentanyl. VSS and monitored during procedure

## 2017-04-26 NOTE — OP REPORT
DATE OF OPERATION: 4/26/2017     PREOPERATIVE DIAGNOSES:   1. Left rib fractures.  2. Left hemopneumothorax.    POSTOPERATIVE DIAGNOSES:   1. Left rib fractures.  2. Left hemopneumothorax.     PROCEDURES PERFORMED:   1. Left tube thoracostomy.     SURGEON: Wisam Munguia M.D.     ASSISTANT: MOY Ibarra.     INDICATIONS: The patient is a 66 year-old gentleman with blunt chest trauma and a traumatic hemopneumothorax. A  left chest tube was placed in the SICU.      FINDINGS: 250 mL hemothorax. Small air leak.    PROCEDURE: Following implied emergent consent, the patient was properly identified and optimally positioned. A moderate sedation consisting of intravenous fentanyl and midazolam was administered. The left chest wall was widely prepped and draped into a sterile field.     Local anesthetic was used to infiltrate the chest tube site. Multiple intercostal nerve blocks were placed above and below the chest tube site dermatome. A 1-cm transverse incision was made and the subcutaneous tissue spread bluntly. The thoracic cavity was accessed bluntly over the rib and a 28 Fr chest tube placed in a posterior apical orientation and secured to the skin with a 0-Ethibond vertical mattress suture.     The chest tube was connected to closed suction drainage and a sterile dressing was applied. The patient tolerated the procedure well. There were no apparent complications.   ____________________________________   Wisam Munguia M.D.    DD: 4/26/2017  2:22 PM

## 2017-04-26 NOTE — ED NOTES
Pt resting on gurney, 4L NC, able to speak in full sentences  Additional purple top drawn and sent to lab.  Report called to Henry STARK in ICU

## 2017-04-27 ENCOUNTER — APPOINTMENT (OUTPATIENT)
Dept: RADIOLOGY | Facility: MEDICAL CENTER | Age: 67
DRG: 183 | End: 2017-04-27
Attending: NURSE PRACTITIONER
Payer: COMMERCIAL

## 2017-04-27 PROBLEM — J94.2 HEMOPNEUMOTHORAX ON LEFT: Status: ACTIVE | Noted: 2017-04-26

## 2017-04-27 PROBLEM — T14.90XA TRAUMA: Status: ACTIVE | Noted: 2017-04-27

## 2017-04-27 LAB
ALBUMIN SERPL BCP-MCNC: 3.7 G/DL (ref 3.2–4.9)
ALBUMIN/GLOB SERPL: 1.2 G/DL
ALP SERPL-CCNC: 82 U/L (ref 30–99)
ALT SERPL-CCNC: 48 U/L (ref 2–50)
ANION GAP SERPL CALC-SCNC: 7 MMOL/L (ref 0–11.9)
AST SERPL-CCNC: 37 U/L (ref 12–45)
BASOPHILS # BLD AUTO: 0.8 % (ref 0–1.8)
BASOPHILS # BLD: 0.05 K/UL (ref 0–0.12)
BILIRUB SERPL-MCNC: 1.3 MG/DL (ref 0.1–1.5)
BUN SERPL-MCNC: 14 MG/DL (ref 8–22)
CALCIUM SERPL-MCNC: 9 MG/DL (ref 8.5–10.5)
CHLORIDE SERPL-SCNC: 92 MMOL/L (ref 96–112)
CO2 SERPL-SCNC: 22 MMOL/L (ref 20–33)
CREAT SERPL-MCNC: 0.57 MG/DL (ref 0.5–1.4)
EOSINOPHIL # BLD AUTO: 0.2 K/UL (ref 0–0.51)
EOSINOPHIL NFR BLD: 3.1 % (ref 0–6.9)
ERYTHROCYTE [DISTWIDTH] IN BLOOD BY AUTOMATED COUNT: 46.2 FL (ref 35.9–50)
GFR SERPL CREATININE-BSD FRML MDRD: >60 ML/MIN/1.73 M 2
GLOBULIN SER CALC-MCNC: 3 G/DL (ref 1.9–3.5)
GLUCOSE SERPL-MCNC: 87 MG/DL (ref 65–99)
HCT VFR BLD AUTO: 41.9 % (ref 42–52)
HGB BLD-MCNC: 14.8 G/DL (ref 14–18)
IMM GRANULOCYTES # BLD AUTO: 0.11 K/UL (ref 0–0.11)
IMM GRANULOCYTES NFR BLD AUTO: 1.7 % (ref 0–0.9)
LYMPHOCYTES # BLD AUTO: 1.76 K/UL (ref 1–4.8)
LYMPHOCYTES NFR BLD: 27 % (ref 22–41)
MAGNESIUM SERPL-MCNC: 2.1 MG/DL (ref 1.5–2.5)
MCH RBC QN AUTO: 34.8 PG (ref 27–33)
MCHC RBC AUTO-ENTMCNC: 35.3 G/DL (ref 33.7–35.3)
MCV RBC AUTO: 98.6 FL (ref 81.4–97.8)
MONOCYTES # BLD AUTO: 0.91 K/UL (ref 0–0.85)
MONOCYTES NFR BLD AUTO: 14 % (ref 0–13.4)
NEUTROPHILS # BLD AUTO: 3.48 K/UL (ref 1.82–7.42)
NEUTROPHILS NFR BLD: 53.4 % (ref 44–72)
NRBC # BLD AUTO: 0 K/UL
NRBC BLD AUTO-RTO: 0 /100 WBC
PHOSPHATE SERPL-MCNC: 3 MG/DL (ref 2.5–4.5)
PLATELET # BLD AUTO: 376 K/UL (ref 164–446)
PMV BLD AUTO: 8.5 FL (ref 9–12.9)
POTASSIUM SERPL-SCNC: 4.1 MMOL/L (ref 3.6–5.5)
PROT SERPL-MCNC: 6.7 G/DL (ref 6–8.2)
RBC # BLD AUTO: 4.25 M/UL (ref 4.7–6.1)
SODIUM SERPL-SCNC: 121 MMOL/L (ref 135–145)
WBC # BLD AUTO: 6.5 K/UL (ref 4.8–10.8)

## 2017-04-27 PROCEDURE — 99233 SBSQ HOSP IP/OBS HIGH 50: CPT | Performed by: SURGERY

## 2017-04-27 PROCEDURE — 85025 COMPLETE CBC W/AUTO DIFF WBC: CPT

## 2017-04-27 PROCEDURE — 770006 HCHG ROOM/CARE - MED/SURG/GYN SEMI*

## 2017-04-27 PROCEDURE — 700102 HCHG RX REV CODE 250 W/ 637 OVERRIDE(OP): Performed by: NURSE PRACTITIONER

## 2017-04-27 PROCEDURE — 700102 HCHG RX REV CODE 250 W/ 637 OVERRIDE(OP): Performed by: SURGERY

## 2017-04-27 PROCEDURE — 83735 ASSAY OF MAGNESIUM: CPT

## 2017-04-27 PROCEDURE — 700111 HCHG RX REV CODE 636 W/ 250 OVERRIDE (IP): Performed by: NURSE PRACTITIONER

## 2017-04-27 PROCEDURE — A9270 NON-COVERED ITEM OR SERVICE: HCPCS | Performed by: NURSE PRACTITIONER

## 2017-04-27 PROCEDURE — 700112 HCHG RX REV CODE 229: Performed by: NURSE PRACTITIONER

## 2017-04-27 PROCEDURE — 80053 COMPREHEN METABOLIC PANEL: CPT

## 2017-04-27 PROCEDURE — A9270 NON-COVERED ITEM OR SERVICE: HCPCS | Performed by: SURGERY

## 2017-04-27 PROCEDURE — 84100 ASSAY OF PHOSPHORUS: CPT

## 2017-04-27 PROCEDURE — 71010 DX-CHEST-PORTABLE (1 VIEW): CPT

## 2017-04-27 RX ORDER — MORPHINE SULFATE 4 MG/ML
1-2 INJECTION, SOLUTION INTRAMUSCULAR; INTRAVENOUS
Status: DISCONTINUED | OUTPATIENT
Start: 2017-04-27 | End: 2017-04-29 | Stop reason: HOSPADM

## 2017-04-27 RX ADMIN — IBUPROFEN 800 MG: 800 TABLET, FILM COATED ORAL at 07:24

## 2017-04-27 RX ADMIN — LOVASTATIN 40 MG: 20 TABLET ORAL at 21:21

## 2017-04-27 RX ADMIN — POLYETHYLENE GLYCOL 3350 1 PACKET: 17 POWDER, FOR SOLUTION ORAL at 07:24

## 2017-04-27 RX ADMIN — DOCUSATE SODIUM 100 MG: 100 CAPSULE ORAL at 02:46

## 2017-04-27 RX ADMIN — STANDARDIZED SENNA CONCENTRATE AND DOCUSATE SODIUM 1 TABLET: 8.6; 5 TABLET, FILM COATED ORAL at 21:21

## 2017-04-27 RX ADMIN — ACETAMINOPHEN 650 MG: 325 TABLET, FILM COATED ORAL at 06:15

## 2017-04-27 RX ADMIN — ACETAMINOPHEN 650 MG: 325 TABLET, FILM COATED ORAL at 19:53

## 2017-04-27 RX ADMIN — ENOXAPARIN SODIUM 30 MG: 100 INJECTION SUBCUTANEOUS at 07:25

## 2017-04-27 RX ADMIN — ACETAMINOPHEN 650 MG: 325 TABLET, FILM COATED ORAL at 14:50

## 2017-04-27 RX ADMIN — POLYETHYLENE GLYCOL 3350 1 PACKET: 17 POWDER, FOR SOLUTION ORAL at 21:22

## 2017-04-27 RX ADMIN — ENOXAPARIN SODIUM 30 MG: 100 INJECTION SUBCUTANEOUS at 21:21

## 2017-04-27 RX ADMIN — TRAZODONE HYDROCHLORIDE 50 MG: 50 TABLET ORAL at 21:21

## 2017-04-27 RX ADMIN — DOCUSATE SODIUM 100 MG: 100 CAPSULE ORAL at 14:50

## 2017-04-27 RX ADMIN — BISACODYL 10 MG: 10 SUPPOSITORY RECTAL at 16:31

## 2017-04-27 RX ADMIN — MAGNESIUM HYDROXIDE 30 ML: 400 SUSPENSION ORAL at 07:24

## 2017-04-27 RX ADMIN — IBUPROFEN 800 MG: 800 TABLET, FILM COATED ORAL at 14:50

## 2017-04-27 ASSESSMENT — PAIN SCALES - GENERAL
PAINLEVEL_OUTOF10: 2
PAINLEVEL_OUTOF10: 1
PAINLEVEL_OUTOF10: 2

## 2017-04-27 ASSESSMENT — ENCOUNTER SYMPTOMS
DOUBLE VISION: 0
CHILLS: 0
FOCAL WEAKNESS: 0
SHORTNESS OF BREATH: 0
ABDOMINAL PAIN: 0
HALLUCINATIONS: 0
FEVER: 0
HEADACHES: 0

## 2017-04-27 NOTE — CARE PLAN
"Problem: Pain Management  Goal: Pain level will decrease to patient’s comfort goal  Intervention: Follow pain managment plan developed in collaboration with patient and Interdisciplinary Team  Pt c/o pain when coughing and repositioning.  Patient demonstrates understanding of \"splinting\" his chest.  Pain managed with Ibuprofen and Tylenol.       Problem: Respiratory:  Goal: Respiratory status will improve  Intervention: Educate and encourage coughing and deep breathing  Pulling 2500 on IS, strong effort.           "

## 2017-04-27 NOTE — CARE PLAN
Problem: Knowledge Deficit  Goal: Knowledge of disease process/condition, treatment plan, diagnostic tests, and medications will improve  Outcome: PROGRESSING AS EXPECTED  Pt educated on plan of care. He verbalized understanding. A

## 2017-04-27 NOTE — PROGRESS NOTES
Trauma/Surgical ETOH Intervention    Author: Racheal Thomason Date & Time created: 4/27/2017   9:31 AM     Tertiary survey completed, no further findings  RAP score 5, on Lovenox  SBIRT completed    ETOH Screening  CAGE Score: 4  Intervention complete date: 4/27/2017  Patient response to intervention: Drinks several glasses of wine nightly with dinner. Denies tobacco or illicit drug use..   Patient demonstrats understanding of intervention.Plan of care: Declines outpatient resource information.    has not been contacted.Follow up with: PCP  Total ETOH intervention time: 15 - 30 mintues    DVT Prophylaxis - Determining Level of Risk    Risk Assessment Profile  1. Underlying conditions    Does not apply WEIGHT = 0  2. Iatrogenic factors   Does not apply WEIGHT = 0  3. Injury related factors   AIS > 2 for Chest WEIGHT = 2  4. Age   greater than or equal to 60 but less than 75 WEIGHT = 3    Total RAP Score = 5    RAP less than or equal to 5 = Low Risk  RAP 6 - 14 = Moderate Risk  RAP greater than 15 = High Risk    AIS > 2 Chest  Penetration injury or avulsion > 20% blood loss  Hemo or pneumothorax  Major vessel injury: intimal tear, laceration, perforation, puncture  Tracheal or bronchus laceration  Pulmonary contusion, Pneumo or hemomediastinum  Major cardiac contusion < 25% EF    AIS > 2 Abdomen  Penetrating injury or avulsion > 20% blood loss  Major vessel injury: intimal tear, laceration, perforation , puncture  Injury to organ greater than simple laceration or contusion  Greater than a grade 1 or 2 organ injury spleen, liver, etc.     AIS > 2 Head  Scalp laceration or avulsion > 20cm, or blood loss 20% or more  Cerebral or carotid artery, sigmoid, traverse sinuses laceration or thrombosis  Cerebrum, brain stem, cerebellum contusion, hemorrhage, hematoma. To include EDH, SDH, SAH, SHYANN  Skull fractures unless simple, nondisplaced and isolated head injury

## 2017-04-27 NOTE — PROGRESS NOTES
Report called to Christy RN on [facility]SU.  Patient stood and ambulated to [facility]. Transported from S127 to T416-1 accompanied by transport.  Wife at the bedside.

## 2017-04-27 NOTE — PROGRESS NOTES
Hand off report received from MI Oseguera. Pt care assumed at this time. Plan of care, active orders and medications reviewed. I will continue to monitor.

## 2017-04-27 NOTE — PROGRESS NOTES
"  Trauma/Surgical Progress Note    Author: Chandana Hankins Date & Time created: 4/27/2017   8:48 AM     Interval Events:  ETOH   Mild tachycardiaIS   2500 cc spirometry  Chest tube 400  Constipated.  Bowel protocol  hypnatremia     Review of Systems   Constitutional: Negative for fever and chills.   Eyes: Negative for double vision.   Respiratory: Negative for shortness of breath.    Cardiovascular: Positive for chest pain.   Gastrointestinal: Negative for abdominal pain.   Neurological: Negative for focal weakness and headaches.   Psychiatric/Behavioral: Negative for hallucinations.     Hemodynamics:  Blood pressure 163/102, pulse 102, temperature 35.7 °C (96.3 °F), resp. rate 26, height 1.778 m (5' 10\"), weight 74.5 kg (164 lb 3.9 oz), SpO2 95 %.     Respiratory:    Respiration: (!) 26, Pulse Oximetry: 95 %, O2 Daily Delivery Respiratory : Silicone Nasal Cannula  Chest Tube Group 1 (A) Left-Tube Status / Drainage: Draining;Moderate;Red, Chest Tube Group 1 (A) Left-Device: Dry Closed Drainage System;Suction 20 cm Water  Work Of Breathing / Effort: Mild  RUL Breath Sounds: Clear, RML Breath Sounds: Clear;Diminished, RLL Breath Sounds: Diminished, ABAD Breath Sounds: Clear, LLL Breath Sounds: Diminished  Fluids:    Intake/Output Summary (Last 24 hours) at 04/27/17 0848  Last data filed at 04/27/17 0600   Gross per 24 hour   Intake    670 ml   Output   1480 ml   Net   -810 ml     Admit Weight: 74.844 kg (165 lb)  Current Weight: 74.5 kg (164 lb 3.9 oz)    Physical Exam   HENT:   Head: Normocephalic.   Eyes: Pupils are equal, round, and reactive to light.   Cardiovascular: Regular rhythm.    Pulmonary/Chest: No respiratory distress. He has no wheezes.   Abdominal: Soft. There is no tenderness.   Musculoskeletal: He exhibits no edema.   Neurological: GCS eye subscore is 4. GCS verbal subscore is 5. GCS motor subscore is 6.   Skin: He is not diaphoretic.       Medical Decision Making/Problem List:    Active Hospital " Problems    Diagnosis   • Closed fracture of multiple ribs of left side with routine healing [S22.42XD]     Priority: High     Left segmental 8th through 9th rib fractures. Posterior 10th and 11th rib fractures.    Aggressive multimodal pain management and pulmonary hygiene. Serial chest radiographs.       • Hemopneumothorax on left [J94.2]     Priority: High     Large left hemopneumothorax with partial collapse of the left upper lobe.  Left tube thoracostomy on admission.  4/27 - CXR without pneumothorax   - [facility] total output 420 ml / small air leak with cough / continue suction     • Hypercholesteremia [E78.00]     Priority: Medium     Chronic condition treated with lovastatin.  Resumed maintenance medication on admission.     • Fall from ground level [W18.30XA]     Priority: Low     Fall in shower on 4 days prior to admission. No loss of consciousness.     • Trauma [T14.90]     Core Measures & Quality Metrics:  Labs reviewed, Medications reviewed and Radiology images reviewed  Dalton catheter: No Dalton      DVT Prophylaxis: Enoxaparin (Lovenox)  DVT prophylaxis - mechanical: SCDs          GÓMEZ Score  Discussed patient condition with RN, RT, Pharmacy and Dietary.  CRITICAL CARE TIME EXCLUDING PROCEDURES: 33   minutes

## 2017-04-27 NOTE — PROGRESS NOTES
0740:  Patient stood EOB and transferred into chair.  SBA, tolerated well.     0910:  Patient presented in interdisciplinary trauma rounds with Dr. Hankins and medical team.  Plan to transfer out of ICU today.

## 2017-04-28 ENCOUNTER — APPOINTMENT (OUTPATIENT)
Dept: RADIOLOGY | Facility: MEDICAL CENTER | Age: 67
DRG: 183 | End: 2017-04-28
Attending: NURSE PRACTITIONER
Payer: COMMERCIAL

## 2017-04-28 PROBLEM — E87.1 HYPONATREMIA: Status: ACTIVE | Noted: 2017-04-26

## 2017-04-28 LAB
ANION GAP SERPL CALC-SCNC: 8 MMOL/L (ref 0–11.9)
BASOPHILS # BLD AUTO: 0.8 % (ref 0–1.8)
BASOPHILS # BLD: 0.05 K/UL (ref 0–0.12)
BUN SERPL-MCNC: 15 MG/DL (ref 8–22)
CALCIUM SERPL-MCNC: 9.1 MG/DL (ref 8.5–10.5)
CHLORIDE SERPL-SCNC: 94 MMOL/L (ref 96–112)
CO2 SERPL-SCNC: 26 MMOL/L (ref 20–33)
CREAT SERPL-MCNC: 0.63 MG/DL (ref 0.5–1.4)
EOSINOPHIL # BLD AUTO: 0.25 K/UL (ref 0–0.51)
EOSINOPHIL NFR BLD: 4 % (ref 0–6.9)
ERYTHROCYTE [DISTWIDTH] IN BLOOD BY AUTOMATED COUNT: 46.8 FL (ref 35.9–50)
GFR SERPL CREATININE-BSD FRML MDRD: >60 ML/MIN/1.73 M 2
GLUCOSE BLD-MCNC: 85 MG/DL (ref 65–99)
GLUCOSE BLD-MCNC: 89 MG/DL (ref 65–99)
GLUCOSE BLD-MCNC: 96 MG/DL (ref 65–99)
GLUCOSE SERPL-MCNC: 83 MG/DL (ref 65–99)
HCT VFR BLD AUTO: 41.8 % (ref 42–52)
HGB BLD-MCNC: 14.6 G/DL (ref 14–18)
IMM GRANULOCYTES # BLD AUTO: 0.13 K/UL (ref 0–0.11)
IMM GRANULOCYTES NFR BLD AUTO: 2.1 % (ref 0–0.9)
LYMPHOCYTES # BLD AUTO: 1.76 K/UL (ref 1–4.8)
LYMPHOCYTES NFR BLD: 28.1 % (ref 22–41)
MCH RBC QN AUTO: 34.7 PG (ref 27–33)
MCHC RBC AUTO-ENTMCNC: 34.9 G/DL (ref 33.7–35.3)
MCV RBC AUTO: 99.3 FL (ref 81.4–97.8)
MONOCYTES # BLD AUTO: 0.66 K/UL (ref 0–0.85)
MONOCYTES NFR BLD AUTO: 10.5 % (ref 0–13.4)
NEUTROPHILS # BLD AUTO: 3.42 K/UL (ref 1.82–7.42)
NEUTROPHILS NFR BLD: 54.5 % (ref 44–72)
NRBC # BLD AUTO: 0 K/UL
NRBC BLD AUTO-RTO: 0 /100 WBC
PLATELET # BLD AUTO: 405 K/UL (ref 164–446)
PMV BLD AUTO: 8.7 FL (ref 9–12.9)
POTASSIUM SERPL-SCNC: 4.1 MMOL/L (ref 3.6–5.5)
RBC # BLD AUTO: 4.21 M/UL (ref 4.7–6.1)
SODIUM SERPL-SCNC: 128 MMOL/L (ref 135–145)
WBC # BLD AUTO: 6.3 K/UL (ref 4.8–10.8)

## 2017-04-28 PROCEDURE — A9270 NON-COVERED ITEM OR SERVICE: HCPCS | Performed by: SURGERY

## 2017-04-28 PROCEDURE — 770006 HCHG ROOM/CARE - MED/SURG/GYN SEMI*

## 2017-04-28 PROCEDURE — A9270 NON-COVERED ITEM OR SERVICE: HCPCS | Performed by: NURSE PRACTITIONER

## 2017-04-28 PROCEDURE — 80048 BASIC METABOLIC PNL TOTAL CA: CPT

## 2017-04-28 PROCEDURE — 700111 HCHG RX REV CODE 636 W/ 250 OVERRIDE (IP): Performed by: NURSE PRACTITIONER

## 2017-04-28 PROCEDURE — 700112 HCHG RX REV CODE 229: Performed by: NURSE PRACTITIONER

## 2017-04-28 PROCEDURE — 700102 HCHG RX REV CODE 250 W/ 637 OVERRIDE(OP): Performed by: NURSE PRACTITIONER

## 2017-04-28 PROCEDURE — 82962 GLUCOSE BLOOD TEST: CPT

## 2017-04-28 PROCEDURE — 700102 HCHG RX REV CODE 250 W/ 637 OVERRIDE(OP): Performed by: SURGERY

## 2017-04-28 PROCEDURE — 36415 COLL VENOUS BLD VENIPUNCTURE: CPT

## 2017-04-28 PROCEDURE — 85025 COMPLETE CBC W/AUTO DIFF WBC: CPT

## 2017-04-28 PROCEDURE — 71010 DX-CHEST-PORTABLE (1 VIEW): CPT

## 2017-04-28 RX ADMIN — DOCUSATE SODIUM 100 MG: 100 CAPSULE ORAL at 08:03

## 2017-04-28 RX ADMIN — TRAZODONE HYDROCHLORIDE 50 MG: 50 TABLET ORAL at 20:58

## 2017-04-28 RX ADMIN — ACETAMINOPHEN 650 MG: 325 TABLET, FILM COATED ORAL at 18:14

## 2017-04-28 RX ADMIN — IBUPROFEN 800 MG: 800 TABLET, FILM COATED ORAL at 12:57

## 2017-04-28 RX ADMIN — IBUPROFEN 800 MG: 800 TABLET, FILM COATED ORAL at 18:14

## 2017-04-28 RX ADMIN — ENOXAPARIN SODIUM 30 MG: 100 INJECTION SUBCUTANEOUS at 08:06

## 2017-04-28 RX ADMIN — ENOXAPARIN SODIUM 30 MG: 100 INJECTION SUBCUTANEOUS at 20:58

## 2017-04-28 RX ADMIN — STANDARDIZED SENNA CONCENTRATE AND DOCUSATE SODIUM 1 TABLET: 8.6; 5 TABLET, FILM COATED ORAL at 20:57

## 2017-04-28 RX ADMIN — MAGNESIUM HYDROXIDE 30 ML: 400 SUSPENSION ORAL at 08:03

## 2017-04-28 RX ADMIN — BISACODYL 10 MG: 10 SUPPOSITORY RECTAL at 18:14

## 2017-04-28 RX ADMIN — DOCUSATE SODIUM 100 MG: 100 CAPSULE ORAL at 20:57

## 2017-04-28 RX ADMIN — LOVASTATIN 40 MG: 20 TABLET ORAL at 20:58

## 2017-04-28 RX ADMIN — ACETAMINOPHEN 650 MG: 325 TABLET, FILM COATED ORAL at 12:57

## 2017-04-28 RX ADMIN — POLYETHYLENE GLYCOL 3350 1 PACKET: 17 POWDER, FOR SOLUTION ORAL at 08:03

## 2017-04-28 RX ADMIN — IBUPROFEN 800 MG: 800 TABLET, FILM COATED ORAL at 08:03

## 2017-04-28 RX ADMIN — ACETAMINOPHEN 650 MG: 325 TABLET, FILM COATED ORAL at 06:09

## 2017-04-28 ASSESSMENT — ENCOUNTER SYMPTOMS
NEUROLOGICAL NEGATIVE: 1
NECK PAIN: 0
PSYCHIATRIC NEGATIVE: 1
EYES NEGATIVE: 1
MYALGIAS: 1
NAUSEA: 0
VOMITING: 0
ABDOMINAL PAIN: 0
DIZZINESS: 0
BACK PAIN: 1
CONSTITUTIONAL NEGATIVE: 1
RESPIRATORY NEGATIVE: 1
SHORTNESS OF BREATH: 0
FOCAL WEAKNESS: 0
SENSORY CHANGE: 0
CONSTIPATION: 1
COUGH: 0

## 2017-04-28 ASSESSMENT — PAIN SCALES - GENERAL
PAINLEVEL_OUTOF10: 3
PAINLEVEL_OUTOF10: 0
PAINLEVEL_OUTOF10: 0
PAINLEVEL_OUTOF10: 8
PAINLEVEL_OUTOF10: 0

## 2017-04-28 NOTE — PROGRESS NOTES
Pt admitted to the floor at 1510, report was called in by MI Peters from the ICU. Patient is ambulatory and walked from [facility] to bed with 1 assistance, A&Ox 4, was medicated for pain prior to transfer. Chest tube to suction, no respiratory complaints, sob or distress.  Uses IS effectively achieving 2500. No BM 5 days, suppository administered, + results, +BM. At this time the admit profile is complete, assessment complete no changes from ICU RN assessment, pt verbalizes understanding of education of call lights and phone numbers, appropriate signs have been placed.

## 2017-04-28 NOTE — PROGRESS NOTES
Assumed care of patient at 1900.  Bedside report completed.  A&O x4. VSS.  Denies pain intervention at this time.  Tolerating regular diet; denies nausea/vomiting.  L chest tube to suction; no air leak noted; SpO2 95% on 1.5 L NC.  Last BM 4/27/17.  Voiding without difficulty.  Call light and personal belongings within reach.  Bed alarm activated.  No additional needs at this time.

## 2017-04-28 NOTE — PROGRESS NOTES
AO x 4, assisted up to chair with sba.  Sitting up eating breakfast. Chest tube to suction, no leak, no sob or respiratory distress.  IS in use, achieves 2500 today.  Medicated for L flank pain, level 8/10 with movement, states none while at rest.  Chest tube site c/d/i dressing. Plan of care discussed, questions answered, verbalized understanding.

## 2017-04-28 NOTE — PROGRESS NOTES
"  Trauma/Surgical Progress Note    Author: Morenita Cheema Date & Time created: 4/28/2017   8:55 AM     Interval Events:  Up in a chair. Adequate pain control with Tylenol.  Strong respiratory effort - IS 2750 cc.  AM CXR stable, no pneumothorax.  CT output 80 ml in 24 hrs, no air leak - to water seal.  Constipation addressed.    Review of Systems   Constitutional: Negative.    HENT: Negative.    Eyes: Negative.    Respiratory: Negative.  Negative for cough and shortness of breath.    Cardiovascular: Positive for chest pain.        Left rib cage pain - rib fractures   Gastrointestinal: Positive for constipation. Negative for nausea, vomiting and abdominal pain.        BM 4/27  Abdominal fullness, constipation - addressed   Genitourinary: Negative.  Negative for dysuria.        Voiding   Musculoskeletal: Positive for myalgias and back pain. Negative for joint pain and neck pain.   Skin: Negative.    Neurological: Negative.  Negative for dizziness, sensory change and focal weakness.   Psychiatric/Behavioral: Negative.      Hemodynamics:  Blood pressure 128/87, pulse 78, temperature 36.1 °C (97 °F), resp. rate 18, height 1.778 m (5' 10\"), weight 74.5 kg (164 lb 3.9 oz), SpO2 96 %.     Respiratory:    Respiration: 18, Pulse Oximetry: 96 %  Chest Tube Group 1 (A) Left-Tube Status / Drainage: Patent;Small;Serosanguinous, Chest Tube Group 1 (A) Left-Device: Suction 20 cm Water  Work Of Breathing / Effort: Mild  RUL Breath Sounds: Clear, RML Breath Sounds: Clear, RLL Breath Sounds: Diminished, ABAD Breath Sounds: Clear, LLL Breath Sounds: Diminished  Fluids:    Intake/Output Summary (Last 24 hours) at 04/28/17 0855  Last data filed at 04/28/17 0400   Gross per 24 hour   Intake   1260 ml   Output   1070 ml   Net    190 ml     Admit Weight: 74.844 kg (165 lb)  Current      Physical Exam   Constitutional: He is oriented to person, place, and time.   HENT:   Head: Normocephalic.   Eyes: Conjunctivae are normal.   Neck: Neck " supple.   Cardiovascular: Normal rate, regular rhythm and intact distal pulses.    Pulmonary/Chest: Effort normal and breath sounds normal. No respiratory distress.   On room air  IS 2750 cc   Abdominal: Soft. Bowel sounds are normal. He exhibits no distension. There is no tenderness.   Musculoskeletal: Normal range of motion. He exhibits no edema.   Neurological: He is alert and oriented to person, place, and time.   Skin: Skin is warm and dry. He is not diaphoretic.   Psychiatric: He has a normal mood and affect. His behavior is normal. Judgment and thought content normal.       Medical Decision Making/Problem List:    Active Hospital Problems    Diagnosis   • Closed fracture of multiple ribs of left side with routine healing [S22.42XD]     Priority: High     Left segmental 8th through 9th rib fractures. Posterior 10th and 11th rib fractures.    Aggressive multimodal pain management and pulmonary hygiene. Serial chest radiographs.     • Hemopneumothorax on left [J94.2]     Priority: High     Large left hemopneumothorax with partial collapse of the left upper lobe.  Left tube thoracostomy on admission.  4/27 - CXR without pneumothorax   - [facility] total output 500 ml / 80 ml in 24 hrs / no air leak / to water seal     • Hypercholesteremia [E78.00]     Priority: Medium     Chronic condition treated with lovastatin.  Resumed maintenance medication on admission.      • Hyponatremia [E87.1]     Priority: Medium     Likely chronic. Asymptomatic.   4/27 Serum sodium 121.  Monitor.     • Trauma [T14.90]     Priority: Low   • Fall from ground level [W18.30XA]     Priority: Low     Fall in shower on 4 days prior to admission. No loss of consciousness.        Core Measures & Quality Metrics:  Labs reviewed, Medications reviewed and Radiology images reviewed  Dalton catheter: No Dalton      DVT Prophylaxis: Enoxaparin (Lovenox)  DVT prophylaxis - mechanical: SCDs  Ulcer prophylaxis: Not indicated    Assessed for rehab: Patient  returned to prior level of function, rehabilitation not indicated at this time    Total Score: 5  ETOH Screening  CAGE Score: 4  Intervention complete date: 4/27/2017  Patient response to intervention: Drinks several glasses of wine nightly with dinner. Denies tobacco or illicit drug use..   Patient demonstrats understanding of intervention.Plan of care: Declines outpatient resource information.    has not been contacted.Follow up with: PCP  Total ETOH intervention time: 15 - 30 mintues    Discussed patient condition with RN, Patient and trauma surgery Dr Munguia.

## 2017-04-28 NOTE — CARE PLAN
Problem: Safety  Goal: Will remain free from falls  Intervention: Implement fall precautions  Bed alarm activated.  Patient instructed to call before getting out of bed and as needed.  Yellow slipper socks and mobility signs placed.

## 2017-04-29 ENCOUNTER — HOSPITAL ENCOUNTER (INPATIENT)
Dept: RADIOLOGY | Facility: MEDICAL CENTER | Age: 67
DRG: 183 | End: 2017-04-29
Attending: NURSE PRACTITIONER | Admitting: SURGERY
Payer: COMMERCIAL

## 2017-04-29 ENCOUNTER — APPOINTMENT (OUTPATIENT)
Dept: RADIOLOGY | Facility: MEDICAL CENTER | Age: 67
DRG: 183 | End: 2017-04-29
Attending: NURSE PRACTITIONER
Payer: COMMERCIAL

## 2017-04-29 VITALS
BODY MASS INDEX: 23.51 KG/M2 | RESPIRATION RATE: 18 BRPM | SYSTOLIC BLOOD PRESSURE: 132 MMHG | WEIGHT: 164.24 LBS | OXYGEN SATURATION: 95 % | HEART RATE: 84 BPM | TEMPERATURE: 97.6 F | DIASTOLIC BLOOD PRESSURE: 86 MMHG | HEIGHT: 70 IN

## 2017-04-29 LAB
BASOPHILS # BLD AUTO: 0.9 % (ref 0–1.8)
BASOPHILS # BLD: 0.06 K/UL (ref 0–0.12)
EOSINOPHIL # BLD AUTO: 0.22 K/UL (ref 0–0.51)
EOSINOPHIL NFR BLD: 3.4 % (ref 0–6.9)
ERYTHROCYTE [DISTWIDTH] IN BLOOD BY AUTOMATED COUNT: 46.5 FL (ref 35.9–50)
HCT VFR BLD AUTO: 42.6 % (ref 42–52)
HGB BLD-MCNC: 14.8 G/DL (ref 14–18)
IMM GRANULOCYTES # BLD AUTO: 0.14 K/UL (ref 0–0.11)
IMM GRANULOCYTES NFR BLD AUTO: 2.2 % (ref 0–0.9)
LYMPHOCYTES # BLD AUTO: 1.79 K/UL (ref 1–4.8)
LYMPHOCYTES NFR BLD: 27.9 % (ref 22–41)
MCH RBC QN AUTO: 34.7 PG (ref 27–33)
MCHC RBC AUTO-ENTMCNC: 34.7 G/DL (ref 33.7–35.3)
MCV RBC AUTO: 100 FL (ref 81.4–97.8)
MONOCYTES # BLD AUTO: 0.87 K/UL (ref 0–0.85)
MONOCYTES NFR BLD AUTO: 13.6 % (ref 0–13.4)
NEUTROPHILS # BLD AUTO: 3.34 K/UL (ref 1.82–7.42)
NEUTROPHILS NFR BLD: 52 % (ref 44–72)
NRBC # BLD AUTO: 0 K/UL
NRBC BLD AUTO-RTO: 0 /100 WBC
PLATELET # BLD AUTO: 398 K/UL (ref 164–446)
PMV BLD AUTO: 8.6 FL (ref 9–12.9)
RBC # BLD AUTO: 4.26 M/UL (ref 4.7–6.1)
WBC # BLD AUTO: 6.4 K/UL (ref 4.8–10.8)

## 2017-04-29 PROCEDURE — A9270 NON-COVERED ITEM OR SERVICE: HCPCS | Performed by: NURSE PRACTITIONER

## 2017-04-29 PROCEDURE — 700111 HCHG RX REV CODE 636 W/ 250 OVERRIDE (IP): Performed by: NURSE PRACTITIONER

## 2017-04-29 PROCEDURE — 700102 HCHG RX REV CODE 250 W/ 637 OVERRIDE(OP): Performed by: NURSE PRACTITIONER

## 2017-04-29 PROCEDURE — 71010 DX-CHEST-PORTABLE (1 VIEW): CPT

## 2017-04-29 PROCEDURE — 700112 HCHG RX REV CODE 229: Performed by: NURSE PRACTITIONER

## 2017-04-29 PROCEDURE — 36415 COLL VENOUS BLD VENIPUNCTURE: CPT

## 2017-04-29 PROCEDURE — 85025 COMPLETE CBC W/AUTO DIFF WBC: CPT

## 2017-04-29 RX ORDER — ACETAMINOPHEN 325 MG/1
650 TABLET ORAL EVERY 6 HOURS
Qty: 30 TAB | Refills: 0 | COMMUNITY
Start: 2017-04-29 | End: 2020-07-14

## 2017-04-29 RX ORDER — IBUPROFEN 800 MG/1
800 TABLET ORAL
Qty: 30 TAB | COMMUNITY
Start: 2017-04-29 | End: 2017-09-22

## 2017-04-29 RX ADMIN — MAGNESIUM HYDROXIDE 30 ML: 400 SUSPENSION ORAL at 08:10

## 2017-04-29 RX ADMIN — ACETAMINOPHEN 650 MG: 325 TABLET, FILM COATED ORAL at 00:26

## 2017-04-29 RX ADMIN — ENOXAPARIN SODIUM 30 MG: 100 INJECTION SUBCUTANEOUS at 08:10

## 2017-04-29 RX ADMIN — ACETAMINOPHEN 650 MG: 325 TABLET, FILM COATED ORAL at 05:25

## 2017-04-29 RX ADMIN — IBUPROFEN 800 MG: 800 TABLET, FILM COATED ORAL at 17:06

## 2017-04-29 RX ADMIN — IBUPROFEN 800 MG: 800 TABLET, FILM COATED ORAL at 11:20

## 2017-04-29 RX ADMIN — IBUPROFEN 800 MG: 800 TABLET, FILM COATED ORAL at 08:10

## 2017-04-29 RX ADMIN — ACETAMINOPHEN 650 MG: 325 TABLET, FILM COATED ORAL at 11:20

## 2017-04-29 RX ADMIN — ACETAMINOPHEN 650 MG: 325 TABLET, FILM COATED ORAL at 17:06

## 2017-04-29 RX ADMIN — DOCUSATE SODIUM 100 MG: 100 CAPSULE ORAL at 08:10

## 2017-04-29 ASSESSMENT — ENCOUNTER SYMPTOMS
RESPIRATORY NEGATIVE: 1
CONSTIPATION: 1
NEUROLOGICAL NEGATIVE: 1
PSYCHIATRIC NEGATIVE: 1
NECK PAIN: 0
ROS GI COMMENTS: BM 4/29
CONSTITUTIONAL NEGATIVE: 1
EYES NEGATIVE: 1
VOMITING: 0
SHORTNESS OF BREATH: 0
DIZZINESS: 0
FOCAL WEAKNESS: 0
ABDOMINAL PAIN: 0
BACK PAIN: 1
SENSORY CHANGE: 0
NAUSEA: 0
MYALGIAS: 1

## 2017-04-29 ASSESSMENT — PAIN SCALES - GENERAL: PAINLEVEL_OUTOF10: 0

## 2017-04-29 NOTE — PROGRESS NOTES
"Blood pressure 112/78, pulse 85, temperature 36.6 °C (97.8 °F), resp. rate 18, height 1.778 m (5' 10\"), weight 74.5 kg (164 lb 3.9 oz), SpO2 95 %.    Interval removal of left chest tube.  AM chest xray images and report reviewed prior to removal.  Patient tolerated procedure well.    Plan:  Follow up chest xray this afternoon.  Discharge home with family pending results.  Continue pulmonary care and mobilization.  "

## 2017-04-29 NOTE — PROGRESS NOTES
Patient received, report taken at bedside, sitting up in bed, alert and oriented x4 and able to make needs known, denies pain or discomfort at this time, chest tube to water seal, respirations even and unlabored with no s/s of distress, bed in lowest position and call light within reach

## 2017-04-29 NOTE — PROGRESS NOTES
"  Trauma/Surgical Progress Note    Author: Morenita Cheema Date & Time created: 4/29/2017   8:54 AM     Interval Events:  Doing well. Strong respiratory effort. Pain controlled.  CXR stable. Chest tube with minimal output - remove.  Follow up CXR later today.  Anticipating discharge home with family within 24 hrs.    Review of Systems   Constitutional: Negative.    HENT: Negative.    Eyes: Negative.    Respiratory: Negative.  Negative for shortness of breath.    Cardiovascular: Positive for chest pain.        Left rib cage pain - rib fractures   Gastrointestinal: Positive for constipation. Negative for nausea, vomiting and abdominal pain.        BM 4/29   Genitourinary: Negative.  Negative for dysuria.        Voiding   Musculoskeletal: Positive for myalgias and back pain. Negative for joint pain and neck pain.   Skin: Negative.    Neurological: Negative.  Negative for dizziness, sensory change and focal weakness.   Psychiatric/Behavioral: Negative.      Hemodynamics:  Blood pressure 126/86, pulse 83, temperature 36.3 °C (97.3 °F), resp. rate 18, height 1.778 m (5' 10\"), weight 74.5 kg (164 lb 3.9 oz), SpO2 97 %.     Respiratory:    Respiration: 18, Pulse Oximetry: 97 %, O2 Daily Delivery Respiratory : Room Air with O2 Available  Chest Tube Group 1 (A) Left-Tube Status / Drainage: Patent;Small;Serosanguinous, Chest Tube Group 1 (A) Left-Device: Suction 20 cm Water     RUL Breath Sounds: Clear, RML Breath Sounds: Clear, RLL Breath Sounds: Diminished, ABAD Breath Sounds: Clear, LLL Breath Sounds: Diminished  Fluids:    Intake/Output Summary (Last 24 hours) at 04/29/17 0854  Last data filed at 04/29/17 0400   Gross per 24 hour   Intake   1090 ml   Output    445 ml   Net    645 ml     Admit Weight: 74.844 kg (165 lb)  Current      Physical Exam   Constitutional: He is oriented to person, place, and time.   HENT:   Head: Normocephalic.   Eyes: Conjunctivae are normal.   Neck: Neck supple.   Cardiovascular: Normal rate, " regular rhythm and intact distal pulses.    Pulmonary/Chest: Effort normal and breath sounds normal. No respiratory distress.   On room air  IS 3000 cc   Abdominal: Soft. Bowel sounds are normal. He exhibits no distension. There is no tenderness.   Musculoskeletal: Normal range of motion. He exhibits no edema.   Neurological: He is alert and oriented to person, place, and time.   Skin: Skin is warm and dry. He is not diaphoretic.   Psychiatric: He has a normal mood and affect. His behavior is normal. Judgment and thought content normal.       Medical Decision Making/Problem List:    Active Hospital Problems    Diagnosis   • Closed fracture of multiple ribs of left side with routine healing [S22.42XD]     Priority: High     Left segmental 8th through 9th rib fractures. Posterior 10th and 11th rib fractures.    Aggressive multimodal pain management and pulmonary hygiene. Serial chest radiographs.       • Hemopneumothorax on left [J94.2]     Priority: High     Large left hemopneumothorax with partial collapse of the left upper lobe.  Left tube thoracostomy on admission.  4/29 - CXR stable without pneumothorax.   - [facility] total output 530 ml / 30 ml in 24 hrs / no air leak / to water seal     • Hypercholesteremia [E78.00]     Priority: Medium     Chronic condition treated with Lovastatin.  Resumed maintenance medication on admission.      • Hyponatremia [E87.1]     Priority: Medium     Likely chronic. Asymptomatic.   4/27 Serum sodium 121.  4/28 Trend up - 128.  Monitor.     • Trauma [T14.90]     Priority: Low   • Fall from ground level [W18.30XA]     Priority: Low     Fall in shower on 4 days prior to admission. No loss of consciousness.         Core Measures & Quality Metrics:  Labs reviewed, Medications reviewed and Radiology images reviewed  Dalton catheter: No Dalton      DVT Prophylaxis: Enoxaparin (Lovenox)  DVT prophylaxis - mechanical: SCDs  Ulcer prophylaxis: Not indicated    Assessed for rehab: Patient returned  to prior level of function, rehabilitation not indicated at this time    Total Score: 5  ETOH Screening  CAGE Score: 4  Intervention complete date: 4/27/2017  Patient response to intervention: Drinks several glasses of wine nightly with dinner. Denies tobacco or illicit drug use..   Patient demonstrats understanding of intervention.Plan of care: Declines outpatient resource information.    has not been contacted.Follow up with: PCP  Total ETOH intervention time: 15 - 30 mintues    Discussed patient condition with RN, Patient and trauma surgery Dr Munguia.

## 2017-04-29 NOTE — CARE PLAN
Problem: Bowel/Gastric:  Goal: Will not experience complications related to bowel motility  Intervention: Implement interventions to promote bowel evacuation if inadequate bowel movements in past 48 hours  Suppository administered per patient request, awaiting results

## 2017-04-30 ENCOUNTER — PATIENT OUTREACH (OUTPATIENT)
Dept: HEALTH INFORMATION MANAGEMENT | Facility: OTHER | Age: 67
End: 2017-04-30

## 2017-04-30 NOTE — PROGRESS NOTES
"Blood pressure 132/86, pulse 84, temperature 36.4 °C (97.6 °F), resp. rate 18, height 1.778 m (5' 10\"), weight 74.5 kg (164 lb 3.9 oz), SpO2 95 %.    Patient evaluated. Tolerating room air at rest and with activity. Breath sounds clear on auscultation. Reports no dyspnea.   Given prescription for a follow up chest xray to be done prior to his appointment with Dr Munguia.    Patient and family verbalize understanding with discharge and follow up instructions.  He is discharged in good condition to the care of his wife.      "

## 2017-04-30 NOTE — DISCHARGE INSTRUCTIONS
Discharge Instructions    Discharged to home by car with relative. Discharged via wheelchair, hospital escort: Yes.  Special equipment needed: Not Applicable    Be sure to schedule a follow-up appointment with your primary care doctor or any specialists as instructed.     Discharge Plan:   Diet Plan: Discussed  Activity Level: Discussed  Confirmed Follow up Appointment: Patient to Call and Schedule Appointment  Confirmed Symptoms Management: Discussed  Medication Reconciliation Updated: Yes  Influenza Vaccine Indication: Not indicated: Previously immunized this influenza season and > 8 years of age    I understand that a diet low in cholesterol, fat, and sodium is recommended for good health. Unless I have been given specific instructions below for another diet, I accept this instruction as my diet prescription.   Other diet:     Special Instructions: None    · Is patient discharged on Warfarin / Coumadin?   No     · Is patient Post Blood Transfusion?      The purpose of blood transfusion is to correct anemia, low levels of blood clotting factors or to correct acute blood loss.   Blood transfusion is very safe but occasionally unexpected adverse reactions do occur. Most adverse reactions occur during transfusion, within one to two days following transfusion or one to two weeks following transfusion. Some adverse reactions can occur one to six months after transfusion and some even years later.             If any of the symptoms listed below happen to you during your transfusion,                                 please notify your nurse immediately.   · Fever or Chills  · Flushing of the Face  · Hives, rash or itching  · Difficulty in breathing or shortness of breath  · Pain or oozing of blood from the IV needle site  · Low back pain  · Nausea or vomiting  · Weakness or fainting  · Chest pain  · Blood in the urine  · Decreased frequency of urination    The above symptoms may also occur within 24 to 48 after  transfusion; if so, notify your physician.     · Yellowing of the skin    This can happen one to six months after transfusion; if so, notify your physician    Depression / Suicide Risk    As you are discharged from this [facility] facility, it is important to learn how to keep safe from harming yourself.    Recognize the warning signs:  · Abrupt changes in personality, positive or negative- including increase in energy   · Giving away possessions  · Change in eating patterns- significant weight changes-  positive or negative  · Change in sleeping patterns- unable to sleep or sleeping all the time   · Unwillingness or inability to communicate  · Depression  · Unusual sadness, discouragement and loneliness  · Talk of wanting to die  · Neglect of personal appearance   · Rebelliousness- reckless behavior  · Withdrawal from people/activities they love  · Confusion- inability to concentrate     If you or a loved one observes any of these behaviors or has concerns about self-harm, here's what you can do:  · Talk about it- your feelings and reasons for harming yourself  · Remove any means that you might use to hurt yourself (examples: pills, rope, extension cords, firearm)  · Get professional help from the community (Mental Health, Substance Abuse, psychological counseling)  · Do not be alone:Call your Safe Contact- someone whom you trust who will be there for you.  · Call your local CRISIS HOTLINE 632-8913 or 019-763-1338  · Call your local Children's Mobile Crisis Response Team [place] (683) 833-0623 or www.[employer]  · Call the toll free National Suicide Prevention Hotlines   · National Suicide Prevention Lifeline 556-196-TMLF (9206)  · National Hope Line Network 800-SUICIDE (050-7329)

## 2017-05-02 ENCOUNTER — HOSPITAL ENCOUNTER (OUTPATIENT)
Dept: RADIOLOGY | Facility: MEDICAL CENTER | Age: 67
End: 2017-05-02
Attending: NURSE PRACTITIONER
Payer: COMMERCIAL

## 2017-05-02 DIAGNOSIS — J94.2 HEMOTHORAX: ICD-10-CM

## 2017-05-02 DIAGNOSIS — S22.42XD MULTIPLE CLOSED FRACTURES OF RIBS OF LEFT SIDE WITH ROUTINE HEALING: ICD-10-CM

## 2017-05-02 PROCEDURE — 71020 DX-CHEST-2 VIEWS: CPT

## 2017-05-07 NOTE — DISCHARGE SUMMARY
Trauma Discharge Summary    DATE OF ADMISSION: 04/26/2017    DATE OF DISCHARGE: 04/29/2017    LENGTH OF STAY: Three days    ATTENDING PHYSICIAN: Wisam Munguia MD, Trauma Critical Care    CONSULTING PHYSICIAN: No consultations during this hospital course    DISCHARGE DIAGNOSIS:  1. Closed fracture of multiple ribs of left side with routine healing  2. Hemopneumothorax on left  3. Hypercholesteremia  4. Hyponatremia  5. Trauma - ground level fall    PROCEDURES:  1. Procedure completed by Dr. Munguia on 04/26/2017: Left tube thoracostomy.     HISTORY OF PRESENT ILLNESS: The patient is a 66 year old male who fell in shower several days prior to his evaluation in a hospital. He struck his left posterior chest. He denies loss of consciousness. His pain worsened over the ensuing days prompting him to seek medical attention. CT imaging from the referring facility demonstrated a partial left-sided pneumothorax and multiple posterior rib fractures. He was transported to [facility] for a definitive trauma care.  The patient was triaged as a Trauma [facility] Transfer in accordance with established pre hospital protocols.    HOSPITAL COURSE: On arrival, the patient underwent extensive radiographic and laboratory studies and was admitted to the critical care team under the direction and supervision of Dr. Munguia. He sustained the above injuries and underwent the above procedure during his stay.  The patient sustained left segmental 8th through 9th rib fractures and posterior 10th and 11th rib fractures.    Aggressive multimodal pain management and pulmonary hygiene was implemented. Patient's status was monitored with serial chest radiographs.    The patient also sustained large left hemopneumothorax with partial collapse of the left upper lobe.  Left tube thoracostomy was performed by Dr Munguia on admission. Serial radiographs demonstrated resolution of pneumothorax and hemothorax and the chest tube was  subsequently removed on 4/29.   The patient has a history of hypercholesteremia treated with Lovastatin. His home medication was resumed on admission.   Hyponatremia noted on admission was likely chronic in nature and asymptomatic. Gentle replacement with intravenous fluids was implemented with serum sodium trending up. The patient remained asymptomatic during his hospital stay.   Tertiary survey was completed without further findings. RAP score and SBIRT were completed during this admission.   On the day of discharge the patient is tolerating room air without dyspnea and with oxygen saturation >93%. He reports adequate pain control. His previous chest tube site is with dressing that will remain in place for next 48 hrs. He is given a prescription for a follow up chest xray. The patient and his wife verbalize understanding with discharge and follow up instructions.     DISCHARGE PHYSICAL EXAM: See [facility] physical exam dated 04/29/2017    DISCHARGE MEDICATIONS: Unable to review controlled substance history as the patient has no data   1. Tylenol 325 mg tab, take two tabs every 6 hrs as needed for pain. OTC  2. Motrin 800 mg tab, take one tab every 6 hrs as needed for pain, OTC  3. Xanax 0.25 mg tab, take one tab 3 times a day as needed for anxiety, resume  4. Desyrel 50 mg tab, take one tab every evening, resume  5. Lovastatin 40 mg tab, take one tab every evening, resume  6. Ambien 10 mg tab, take one tab at bedtime, resume    DISPOSITION: The patient will be discharged home under the care and supervision of his family on 04/29/2017. He will follow up with Dr. Munguia in one week with a follow up chest xray. The patient and family have been extensively counseled and all questions have been answered. Special attention was paid to respiratory difficulty, shortness of breath, sudden onset of chest pain, increasing pain uncontrolled with medication, drainage or bleeding from chest tube site, fever, and signs and symptoms of  infection and to seek immediate medical attention if these develop. The patient and family demonstrate understanding and give verbal compliance with discharge instructions.    TIME SPENT ON DISCHARGE: 40 minutes    ____________________________________  Wisam Munguia MD, Trauma Critical Care    ____________________________________  SONIA Reddy

## 2017-09-15 LAB
ALBUMIN SERPL-MCNC: 4.4 G/DL (ref 3.6–4.8)
ALBUMIN/GLOB SERPL: 1.5 {RATIO} (ref 1.2–2.2)
ALP SERPL-CCNC: 83 IU/L (ref 39–117)
ALT SERPL-CCNC: 55 IU/L (ref 0–44)
AST SERPL-CCNC: 48 IU/L (ref 0–40)
BASOPHILS # BLD AUTO: 0 X10E3/UL (ref 0–0.2)
BASOPHILS NFR BLD AUTO: 0 %
BILIRUB SERPL-MCNC: 0.7 MG/DL (ref 0–1.2)
BUN SERPL-MCNC: 10 MG/DL (ref 8–27)
BUN/CREAT SERPL: 14 (ref 10–24)
CALCIUM SERPL-MCNC: 9.5 MG/DL (ref 8.6–10.2)
CHLORIDE SERPL-SCNC: 96 MMOL/L (ref 96–106)
CHOLEST SERPL-MCNC: 207 MG/DL (ref 100–199)
CO2 SERPL-SCNC: 20 MMOL/L (ref 18–29)
COMMENT 011824: ABNORMAL
CREAT SERPL-MCNC: 0.69 MG/DL (ref 0.76–1.27)
EOSINOPHIL # BLD AUTO: 0.1 X10E3/UL (ref 0–0.4)
EOSINOPHIL NFR BLD AUTO: 2 %
ERYTHROCYTE [DISTWIDTH] IN BLOOD BY AUTOMATED COUNT: 13.3 % (ref 12.3–15.4)
GLOBULIN SER CALC-MCNC: 3 G/DL (ref 1.5–4.5)
GLUCOSE SERPL-MCNC: 92 MG/DL (ref 65–99)
HCT VFR BLD AUTO: 45.3 % (ref 37.5–51)
HDLC SERPL-MCNC: 73 MG/DL
HGB BLD-MCNC: 16.2 G/DL (ref 12.6–17.7)
IMM GRANULOCYTES # BLD: 0 X10E3/UL (ref 0–0.1)
IMM GRANULOCYTES NFR BLD: 0 %
IMMATURE CELLS  115398: ABNORMAL
LDLC SERPL CALC-MCNC: 120 MG/DL (ref 0–99)
LYMPHOCYTES # BLD AUTO: 2.1 X10E3/UL (ref 0.7–3.1)
LYMPHOCYTES NFR BLD AUTO: 40 %
MCH RBC QN AUTO: 34.5 PG (ref 26.6–33)
MCHC RBC AUTO-ENTMCNC: 35.8 G/DL (ref 31.5–35.7)
MCV RBC AUTO: 97 FL (ref 79–97)
MONOCYTES # BLD AUTO: 0.8 X10E3/UL (ref 0.1–0.9)
MONOCYTES NFR BLD AUTO: 16 %
MORPHOLOGY BLD-IMP: ABNORMAL
NEUTROPHILS # BLD AUTO: 2.2 X10E3/UL (ref 1.4–7)
NEUTROPHILS NFR BLD AUTO: 42 %
NRBC BLD AUTO-RTO: ABNORMAL %
PLATELET # BLD AUTO: 375 X10E3/UL (ref 150–379)
POTASSIUM SERPL-SCNC: 4.6 MMOL/L (ref 3.5–5.2)
PROT SERPL-MCNC: 7.4 G/DL (ref 6–8.5)
RBC # BLD AUTO: 4.69 X10E6/UL (ref 4.14–5.8)
SODIUM SERPL-SCNC: 133 MMOL/L (ref 134–144)
TRIGL SERPL-MCNC: 70 MG/DL (ref 0–149)
VIT B12 SERPL-MCNC: 599 PG/ML (ref 211–946)
VLDLC SERPL CALC-MCNC: 14 MG/DL (ref 5–40)
WBC # BLD AUTO: 5.3 X10E3/UL (ref 3.4–10.8)

## 2017-09-22 ENCOUNTER — OFFICE VISIT (OUTPATIENT)
Dept: MEDICAL GROUP | Age: 67
End: 2017-09-22
Payer: COMMERCIAL

## 2017-09-22 VITALS
BODY MASS INDEX: 23.77 KG/M2 | TEMPERATURE: 97.2 F | DIASTOLIC BLOOD PRESSURE: 80 MMHG | SYSTOLIC BLOOD PRESSURE: 128 MMHG | OXYGEN SATURATION: 93 % | WEIGHT: 166 LBS | HEIGHT: 70 IN | HEART RATE: 109 BPM

## 2017-09-22 DIAGNOSIS — E78.2 MIXED HYPERLIPIDEMIA: ICD-10-CM

## 2017-09-22 DIAGNOSIS — Z23 NEED FOR SHINGLES VACCINE: ICD-10-CM

## 2017-09-22 DIAGNOSIS — F41.9 ANXIETY: ICD-10-CM

## 2017-09-22 DIAGNOSIS — E53.8 VITAMIN B 12 DEFICIENCY: ICD-10-CM

## 2017-09-22 DIAGNOSIS — F51.01 PRIMARY INSOMNIA: ICD-10-CM

## 2017-09-22 DIAGNOSIS — Z23 NEED FOR VACCINATION: ICD-10-CM
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
## 2017-09-22 PROBLEM — S22.42XD CLOSED FRACTURE OF MULTIPLE RIBS OF LEFT SIDE WITH ROUTINE HEALING: Status: RESOLVED | Noted: 2017-04-26 | Resolved: 2017-09-22

## 2017-09-22 PROBLEM — J94.2 HEMOPNEUMOTHORAX ON LEFT: Status: RESOLVED | Noted: 2017-04-26 | Resolved: 2017-09-22

## 2017-09-22 PROBLEM — E87.1 HYPONATREMIA: Status: RESOLVED | Noted: 2017-04-26 | Resolved: 2017-09-22

## 2017-09-22 PROBLEM — T14.90XA TRAUMA: Status: RESOLVED | Noted: 2017-04-27 | Resolved: 2017-09-22

## 2017-09-22 PROCEDURE — 90471 IMMUNIZATION ADMIN: CPT | Performed by: INTERNAL MEDICINE

## 2017-09-22 PROCEDURE — 99214 OFFICE O/P EST MOD 30 MIN: CPT | Mod: 25 | Performed by: INTERNAL MEDICINE

## 2017-09-22 PROCEDURE — 90662 IIV NO PRSV INCREASED AG IM: CPT | Performed by: INTERNAL MEDICINE

## 2017-09-22 PROCEDURE — 90472 IMMUNIZATION ADMIN EACH ADD: CPT | Performed by: INTERNAL MEDICINE

## 2017-09-22 PROCEDURE — 90670 PCV13 VACCINE IM: CPT | Performed by: INTERNAL MEDICINE

## 2017-09-22 RX ORDER — LOVASTATIN 40 MG/1
40 TABLET ORAL DAILY
Qty: 90 TAB | Refills: 4 | Status: SHIPPED | OUTPATIENT
Start: 2017-09-22 | End: 2017-09-22

## 2017-09-22 RX ORDER — ALPRAZOLAM 0.25 MG/1
TABLET ORAL
Qty: 90 TAB | Refills: 0 | Status: SHIPPED | OUTPATIENT
Start: 2017-09-22 | End: 2017-12-27 | Stop reason: SDUPTHER

## 2017-09-22 RX ORDER — TRAZODONE HYDROCHLORIDE 50 MG/1
TABLET ORAL
Qty: 90 TAB | Refills: 4 | Status: SHIPPED | OUTPATIENT
Start: 2017-09-22 | End: 2017-09-22 | Stop reason: SDUPTHER

## 2017-09-22 RX ORDER — LOVASTATIN 40 MG/1
40 TABLET ORAL DAILY
Qty: 90 TAB | Refills: 4 | Status: SHIPPED | OUTPATIENT
Start: 2017-09-22 | End: 2018-09-20 | Stop reason: SDUPTHER

## 2017-09-22 RX ORDER — TRAZODONE HYDROCHLORIDE 50 MG/1
TABLET ORAL
Qty: 90 TAB | Refills: 4 | Status: SHIPPED | OUTPATIENT
Start: 2017-09-22 | End: 2018-09-20 | Stop reason: SDUPTHER

## 2017-09-22 RX ORDER — ZOLPIDEM TARTRATE 10 MG/1
TABLET ORAL
Qty: 90 TAB | Refills: 0 | Status: SHIPPED | OUTPATIENT
Start: 2017-09-22 | End: 2018-05-04 | Stop reason: SDUPTHER

## 2017-09-22 ASSESSMENT — ENCOUNTER SYMPTOMS
MUSCULOSKELETAL NEGATIVE: 1
RESPIRATORY NEGATIVE: 1
CARDIOVASCULAR NEGATIVE: 1
GASTROINTESTINAL NEGATIVE: 1
CONSTITUTIONAL NEGATIVE: 1
PSYCHIATRIC NEGATIVE: 1
EYES NEGATIVE: 1
NEUROLOGICAL NEGATIVE: 1

## 2017-09-22 ASSESSMENT — PATIENT HEALTH QUESTIONNAIRE - PHQ9
5. POOR APPETITE OR OVEREATING: 0 - NOT AT ALL
CLINICAL INTERPRETATION OF PHQ2 SCORE: 6
SUM OF ALL RESPONSES TO PHQ QUESTIONS 1-9: 9

## 2017-09-22 NOTE — PROGRESS NOTES
Subjective:      Mauricio Obando is a 67 y.o. male who presents with Annual Exam    The patient is here for followup of chronic medical problems listed below. The patient is compliant with medications and having no side effects from them. Denies chest pain, abdominal pain, dyspnea, myalgias, or cough.   Patient Active Problem List    Diagnosis Date Noted   • Hypercholesteremia 04/26/2017     Priority: Medium   • Vitamin B 12 deficiency 08/12/2016   • Anxiety 08/02/2013   • Mixed hyperlipidemia 05/01/2012   • Insomnia 05/01/2012     No Known Allergies  Outpatient Medications Prior to Visit   Medication Sig Dispense Refill   • cyanocobalamin (VITAMIN B12) 1000 MCG Tab Take 1 Tab by mouth every day. 100 Tab 4   • acetaminophen (TYLENOL) 325 MG Tab Take 2 Tabs by mouth every 6 hours. 30 Tab 0   • ibuprofen (MOTRIN) 800 MG Tab Take 1 Tab by mouth 3 times a day, with meals. 30 Tab    • zolpidem (AMBIEN) 10 MG Tab Take 10 mg by mouth at bedtime as needed for Sleep.     • lovastatin (MEVACOR) 40 MG tablet Take 40 mg by mouth every evening.     • trazodone (DESYREL) 50 MG Tab Take 50 mg by mouth every evening.     • alprazolam (XANAX) 0.25 MG Tab Take 0.25 mg by mouth 3 times a day as needed for Sleep or Anxiety.     • zolpidem (AMBIEN) 10 MG Tab TAKE 1 TABLET BY MOUTH AT BEDTIME AS NEEDED FOR SLEEP 90 Tab 0   • alprazolam (XANAX) 0.25 MG Tab TAKE 1 TABLET BY MOUTH THREE TIMES A DAY AS NEEDED 90 Tab 0   • trazodone (DESYREL) 50 MG Tab TAKE 1 TABLET BY MOUTH EVERY NIGHT AT BEDTIME 90 Tab 0   • lovastatin (MEVACOR) 40 MG tablet Take 1 Tab by mouth every day. 90 Tab 4   • NON SPECIFIED Shingles vaccine 1 Each 0     No facility-administered medications prior to visit.              HPI    Review of Systems   Constitutional: Negative.    HENT: Negative.    Eyes: Negative.    Respiratory: Negative.    Cardiovascular: Negative.    Gastrointestinal: Negative.    Genitourinary: Negative.    Musculoskeletal: Negative.    Skin: Negative.   "  Neurological: Negative.    Endo/Heme/Allergies: Negative.    Psychiatric/Behavioral: Negative.           Objective:     /80   Pulse (!) 109   Temp 36.2 °C (97.2 °F)   Ht 1.778 m (5' 10\")   Wt 75.3 kg (166 lb)   SpO2 93%   BMI 23.82 kg/m²      Physical Exam   Constitutional: He is oriented to person, place, and time. He appears well-developed and well-nourished. No distress.   HENT:   Head: Normocephalic and atraumatic.   Right Ear: External ear normal.   Left Ear: External ear normal.   Nose: Nose normal.   Mouth/Throat: Oropharynx is clear and moist. No oropharyngeal exudate.   Eyes: Conjunctivae and EOM are normal. Pupils are equal, round, and reactive to light. Right eye exhibits no discharge. Left eye exhibits no discharge. No scleral icterus.   Neck: Normal range of motion. Neck supple. No JVD present. No tracheal deviation present. No thyromegaly present.   Cardiovascular: Normal rate, regular rhythm, normal heart sounds and intact distal pulses.  Exam reveals no gallop and no friction rub.    No murmur heard.  Pulmonary/Chest: Effort normal and breath sounds normal. No stridor. No respiratory distress. He has no wheezes. He has no rales. He exhibits no tenderness.   Abdominal: Soft. Bowel sounds are normal. He exhibits no distension and no mass. There is no tenderness. There is no rebound and no guarding.   Musculoskeletal: Normal range of motion. He exhibits no edema or tenderness.   Lymphadenopathy:     He has no cervical adenopathy.   Neurological: He is alert and oriented to person, place, and time. He has normal reflexes. He displays normal reflexes. He exhibits normal muscle tone. Coordination normal.   Skin: Skin is warm and dry. No rash noted. He is not diaphoretic. No erythema. No pallor.   Psychiatric: He has a normal mood and affect. His behavior is normal. Judgment and thought content normal.     Orders Only on 09/14/2017   Component Date Value   • WBC 09/15/2017 5.3    • RBC " 09/15/2017 4.69    • Hemoglobin 09/15/2017 16.2    • Hematocrit 09/15/2017 45.3    • MCV 09/15/2017 97    • MCH 09/15/2017 34.5*   • MCHC 09/15/2017 35.8*   • RDW 09/15/2017 13.3    • Platelet Count 09/15/2017 375    • Neutrophils-Polys 09/15/2017 42    • Lymphocytes 09/15/2017 40    • Monocytes 09/15/2017 16    • Eosinophils 09/15/2017 2    • Basophils 09/15/2017 0    • Immature Cells 09/15/2017 CANCELED    • Neutrophils (Absolute) 09/15/2017 2.2    • Lymphs (Absolute) 09/15/2017 2.1    • Monos (Absolute) 09/15/2017 0.8    • Eos (Absolute) 09/15/2017 0.1    • Baso (Absolute) 09/15/2017 0.0    • Immature Granulocytes 09/15/2017 0    • Immature Granulocytes (a* 09/15/2017 0.0    • Nucleated RBC 09/15/2017 CANCELED    • Comments-Diff 09/15/2017 CANCELED    • Glucose 09/15/2017 92    • Bun 09/15/2017 10    • Creatinine 09/15/2017 0.69*   • GFR If Non  Ameri* 09/15/2017 100    • GFR If  09/15/2017 115    • Bun-Creatinine Ratio 09/15/2017 14    • Sodium 09/15/2017 133*   • Potassium 09/15/2017 4.6    • Chloride 09/15/2017 96    • Co2 09/15/2017 20    • Calcium 09/15/2017 9.5    • Total Protein 09/15/2017 7.4    • Albumin 09/15/2017 4.4    • Globulin 09/15/2017 3.0    • A-G Ratio 09/15/2017 1.5    • Total Bilirubin 09/15/2017 0.7    • Alkaline Phosphatase 09/15/2017 83    • AST(SGOT) 09/15/2017 48*   • ALT(SGPT) 09/15/2017 55*   • Cholesterol,Tot 09/15/2017 207*   • Triglycerides 09/15/2017 70    • HDL 09/15/2017 73    • VLDL Cholesterol Calc 09/15/2017 14    • LDL 09/15/2017 120*   • Comment: 09/15/2017 CANCELED    • Vitamin B12 -True Cobala* 09/15/2017 599       No results found for: HBA1C  Lab Results   Component Value Date/Time    SODIUM 133 (L) 09/14/2017 09:33 AM    SODIUM 128 (L) 04/28/2017 11:01 AM    POTASSIUM 4.6 09/14/2017 09:33 AM    POTASSIUM 4.1 04/28/2017 11:01 AM    CHLORIDE 96 09/14/2017 09:33 AM    CHLORIDE 94 (L) 04/28/2017 11:01 AM    CO2 20 09/14/2017 09:33 AM    CO2 26  04/28/2017 11:01 AM    GLUCOSE 92 09/14/2017 09:33 AM    GLUCOSE 83 04/28/2017 11:01 AM    BUN 10 09/14/2017 09:33 AM    BUN 15 04/28/2017 11:01 AM    CREATININE 0.69 (L) 09/14/2017 09:33 AM    CREATININE 0.63 04/28/2017 11:01 AM    CREATININE 0.88 01/24/2013 09:31 AM    BUNCREATRAT 14 09/14/2017 09:33 AM    BUNCREATRAT 11 01/24/2013 09:31 AM    ALKPHOSPHAT 83 09/14/2017 09:33 AM    ALKPHOSPHAT 82 04/27/2017 03:30 AM    ASTSGOT 48 (H) 09/14/2017 09:33 AM    ASTSGOT 37 04/27/2017 03:30 AM    ALTSGPT 55 (H) 09/14/2017 09:33 AM    ALTSGPT 48 04/27/2017 03:30 AM    TBILIRUBIN 0.7 09/14/2017 09:33 AM    TBILIRUBIN 1.3 04/27/2017 03:30 AM     Lab Results   Component Value Date/Time    INR 0.86 (L) 04/26/2017 12:35 PM     Lab Results   Component Value Date/Time    CHOLSTRLTOT 207 (H) 09/14/2017 09:33 AM    CHOLSTRLTOT 199 01/24/2013 09:31 AM     (H) 09/14/2017 09:33 AM     (H) 01/24/2013 09:31 AM    HDL 73 09/14/2017 09:33 AM    HDL 57 01/24/2013 09:31 AM    TRIGLYCERIDE 70 09/14/2017 09:33 AM    TRIGLYCERIDE 111 01/24/2013 09:31 AM       No results found for: TESTOSTERONE  Lab Results   Component Value Date/Time    TSH 1.830 01/29/2014 09:34 AM    TSH 1.230 04/24/2012 09:43 AM     No results found for: FREET4  No results found for: URICACID  No components found for: VITB12  Lab Results   Component Value Date/Time    25HYDROXY 31.1 07/31/2014 09:32 AM    25HYDROXY 30.6 01/29/2014 09:34 AM               Assessment/Plan:     1. Need for vaccination      - INFLUENZA VACCINE, HIGH DOSE (65+ ONLY)  - Pneumococcal Conjugate Vaccine 13-Valent <4yo IM    2. Mixed hyperlipidemia   Under good control. Continue same regimen.  - lovastatin (MEVACOR) 40 MG tablet; Take 1 Tab by mouth every day.  Dispense: 90 Tab; Refill: 4  - TSH; Future  - COMP METABOLIC PANEL; Future  - LIPID PROFILE; Future  - CBC WITH DIFFERENTIAL; Future    3. Primary insomnia    Under good control. Continue same regimen.  - zolpidem (AMBIEN) 10 MG  Tab; TAKE 1 TABLET BY MOUTH AT BEDTIME AS NEEDED FOR SLEEP  Dispense: 90 Tab; Refill: 0  - trazodone (DESYREL) 50 MG Tab; TAKE 1 TABLET BY MOUTH EVERY NIGHT AT BEDTIME  Dispense: 90 Tab; Refill: 4    4. Vitamin B 12 deficiency-     Under good control. Continue same regimen. 1 mg  qd  5. Anxiety        Under good control. Continue same regimen.  - alprazolam (XANAX) 0.25 MG Tab; TAKE 1 TABLET BY MOUTH THREE TIMES A DAY AS NEEDED  Dispense: 90 Tab; Refill: 0    6. Need for shingles vaccine     - NON SPECIFIED; Shingles vaccine  Dispense: 1 Each; Refill: 0    30 minute face-to-face encounter took place today.  More than half of this time was spent in the coordination of care of the above problems, as well as counseling.

## 2017-12-27 DIAGNOSIS — F41.9 ANXIETY: ICD-10-CM

## 2017-12-29 RX ORDER — ALPRAZOLAM 0.25 MG/1
0.25 TABLET ORAL 3 TIMES DAILY PRN
Qty: 30 TAB | Refills: 0 | Status: SHIPPED
Start: 2017-12-29 | End: 2018-01-28

## 2018-02-16 DIAGNOSIS — F41.9 ANXIETY: ICD-10-CM

## 2018-02-20 RX ORDER — ALPRAZOLAM 0.25 MG/1
TABLET ORAL
Qty: 90 TAB | Refills: 5 | Status: SHIPPED
Start: 2018-02-20 | End: 2018-03-22

## 2018-03-20 LAB
ALBUMIN SERPL-MCNC: 4.6 G/DL (ref 3.6–4.8)
ALBUMIN/GLOB SERPL: 1.5 {RATIO} (ref 1.2–2.2)
ALP SERPL-CCNC: 75 IU/L (ref 39–117)
ALT SERPL-CCNC: 35 IU/L (ref 0–44)
AST SERPL-CCNC: 24 IU/L (ref 0–40)
BASOPHILS # BLD AUTO: 0 X10E3/UL (ref 0–0.2)
BASOPHILS NFR BLD AUTO: 0 %
BILIRUB SERPL-MCNC: 0.8 MG/DL (ref 0–1.2)
BUN SERPL-MCNC: 10 MG/DL (ref 8–27)
BUN/CREAT SERPL: 15 (ref 10–24)
CALCIUM SERPL-MCNC: 9.7 MG/DL (ref 8.6–10.2)
CHLORIDE SERPL-SCNC: 96 MMOL/L (ref 96–106)
CHOLEST SERPL-MCNC: 228 MG/DL (ref 100–199)
CO2 SERPL-SCNC: 19 MMOL/L (ref 18–29)
CREAT SERPL-MCNC: 0.67 MG/DL (ref 0.76–1.27)
EOSINOPHIL # BLD AUTO: 0 X10E3/UL (ref 0–0.4)
EOSINOPHIL NFR BLD AUTO: 0 %
ERYTHROCYTE [DISTWIDTH] IN BLOOD BY AUTOMATED COUNT: 14.3 % (ref 12.3–15.4)
GFR SERPLBLD CREATININE-BSD FMLA CKD-EPI: 101 ML/MIN/1.73
GFR SERPLBLD CREATININE-BSD FMLA CKD-EPI: 117 ML/MIN/1.73
GLOBULIN SER CALC-MCNC: 3 G/DL (ref 1.5–4.5)
GLUCOSE SERPL-MCNC: 96 MG/DL (ref 65–99)
HCT VFR BLD AUTO: 47.8 % (ref 37.5–51)
HDLC SERPL-MCNC: 75 MG/DL
HGB BLD-MCNC: 16.6 G/DL (ref 13–17.7)
IMM GRANULOCYTES # BLD: 0 X10E3/UL (ref 0–0.1)
IMM GRANULOCYTES NFR BLD: 0 %
IMMATURE CELLS  115398: ABNORMAL
LABORATORY COMMENT REPORT: ABNORMAL
LDLC SERPL CALC-MCNC: 139 MG/DL (ref 0–99)
LYMPHOCYTES # BLD AUTO: 2.4 X10E3/UL (ref 0.7–3.1)
LYMPHOCYTES NFR BLD AUTO: 26 %
MCH RBC QN AUTO: 34.6 PG (ref 26.6–33)
MCHC RBC AUTO-ENTMCNC: 34.7 G/DL (ref 31.5–35.7)
MCV RBC AUTO: 100 FL (ref 79–97)
MONOCYTES # BLD AUTO: 1 X10E3/UL (ref 0.1–0.9)
MONOCYTES NFR BLD AUTO: 12 %
MORPHOLOGY BLD-IMP: ABNORMAL
NEUTROPHILS # BLD AUTO: 5.6 X10E3/UL (ref 1.4–7)
NEUTROPHILS NFR BLD AUTO: 62 %
NRBC BLD AUTO-RTO: ABNORMAL %
PLATELET # BLD AUTO: 418 X10E3/UL (ref 150–379)
POTASSIUM SERPL-SCNC: 4.4 MMOL/L (ref 3.5–5.2)
PROT SERPL-MCNC: 7.6 G/DL (ref 6–8.5)
RBC # BLD AUTO: 4.8 X10E6/UL (ref 4.14–5.8)
SODIUM SERPL-SCNC: 135 MMOL/L (ref 134–144)
TRIGL SERPL-MCNC: 68 MG/DL (ref 0–149)
TSH SERPL DL<=0.005 MIU/L-ACNC: 1.97 UIU/ML (ref 0.45–4.5)
VLDLC SERPL CALC-MCNC: 14 MG/DL (ref 5–40)
WBC # BLD AUTO: 9.1 X10E3/UL (ref 3.4–10.8)

## 2018-03-22 ENCOUNTER — OFFICE VISIT (OUTPATIENT)
Dept: MEDICAL GROUP | Age: 68
End: 2018-03-22
Payer: COMMERCIAL

## 2018-03-22 VITALS
DIASTOLIC BLOOD PRESSURE: 86 MMHG | BODY MASS INDEX: 24.2 KG/M2 | TEMPERATURE: 97.9 F | WEIGHT: 169 LBS | RESPIRATION RATE: 16 BRPM | SYSTOLIC BLOOD PRESSURE: 136 MMHG | OXYGEN SATURATION: 91 % | HEART RATE: 80 BPM | HEIGHT: 70 IN

## 2018-03-22 DIAGNOSIS — E53.8 VITAMIN B 12 DEFICIENCY: ICD-10-CM

## 2018-03-22 DIAGNOSIS — I10 ESSENTIAL HYPERTENSION: ICD-10-CM

## 2018-03-22 DIAGNOSIS — F51.01 PRIMARY INSOMNIA: ICD-10-CM

## 2018-03-22 DIAGNOSIS — F41.9 ANXIETY: ICD-10-CM

## 2018-03-22 DIAGNOSIS — E78.2 MIXED HYPERLIPIDEMIA: ICD-10-CM

## 2018-03-22 PROBLEM — E78.00 HYPERCHOLESTEREMIA: Status: RESOLVED | Noted: 2017-04-26 | Resolved: 2018-03-22

## 2018-03-22 PROCEDURE — 99214 OFFICE O/P EST MOD 30 MIN: CPT | Performed by: INTERNAL MEDICINE

## 2018-03-22 ASSESSMENT — ENCOUNTER SYMPTOMS
EYES NEGATIVE: 1
CARDIOVASCULAR NEGATIVE: 1
NEUROLOGICAL NEGATIVE: 1
MUSCULOSKELETAL NEGATIVE: 1
RESPIRATORY NEGATIVE: 1
PSYCHIATRIC NEGATIVE: 1
CONSTITUTIONAL NEGATIVE: 1
GASTROINTESTINAL NEGATIVE: 1

## 2018-03-22 NOTE — PROGRESS NOTES
"Subjective:      Mauricio Obando is a 67 y.o. male who presents with Follow-Up  The patient is here for followup of chronic medical problems listed below. The patient is compliant with medications and having no side effects from them. Denies chest pain, abdominal pain, dyspnea, myalgias, or cough.   Patient Active Problem List    Diagnosis Date Noted   • Essential hypertension- borderline no meds 03/22/2018   • Vitamin B 12 deficiency 08/12/2016   • Anxiety 08/02/2013   • Mixed hyperlipidemia 05/01/2012   • Primary insomnia 05/01/2012     Patient has no known allergies.  Outpatient Medications Prior to Visit   Medication Sig Dispense Refill   • lovastatin (MEVACOR) 40 MG tablet Take 1 Tab by mouth every day. 90 Tab 4   • trazodone (DESYREL) 50 MG Tab TAKE 1 TABLET BY MOUTH EVERY NIGHT AT BEDTIME 90 Tab 4   • cyanocobalamin (VITAMIN B12) 1000 MCG Tab Take 1 Tab by mouth every day. 100 Tab 4   • ALPRAZolam (XANAX) 0.25 MG Tab TAKE 1 TABLET BY MOUTH THREE TIMES DAILY AS NEEDED 90 Tab 5   • zolpidem (AMBIEN) 10 MG Tab TAKE 1 TABLET BY MOUTH AT BEDTIME AS NEEDED FOR SLEEP 90 Tab 0   • NON SPECIFIED Shingles vaccine 1 Each 0   • acetaminophen (TYLENOL) 325 MG Tab Take 2 Tabs by mouth every 6 hours. 30 Tab 0     No facility-administered medications prior to visit.                HPI    Review of Systems   Constitutional: Negative.    HENT: Negative.    Eyes: Negative.    Respiratory: Negative.    Cardiovascular: Negative.    Gastrointestinal: Negative.    Genitourinary: Negative.    Musculoskeletal: Negative.    Skin: Negative.    Neurological: Negative.    Endo/Heme/Allergies: Negative.    Psychiatric/Behavioral: Negative.           Objective:     /86   Pulse 80   Temp 36.6 °C (97.9 °F)   Resp 16   Ht 1.778 m (5' 10\")   Wt 76.7 kg (169 lb)   SpO2 91%   BMI 24.25 kg/m²      Physical Exam   Constitutional: He is oriented to person, place, and time. He appears well-developed and well-nourished. No distress.   HENT: "   Head: Normocephalic and atraumatic.   Right Ear: External ear normal.   Left Ear: External ear normal.   Nose: Nose normal.   Mouth/Throat: Oropharynx is clear and moist. No oropharyngeal exudate.   Eyes: Conjunctivae and EOM are normal. Pupils are equal, round, and reactive to light. Right eye exhibits no discharge. Left eye exhibits no discharge. No scleral icterus.   Neck: Normal range of motion. Neck supple. No JVD present. No tracheal deviation present. No thyromegaly present.   Cardiovascular: Normal rate, regular rhythm, normal heart sounds and intact distal pulses.  Exam reveals no gallop and no friction rub.    No murmur heard.  Pulmonary/Chest: Effort normal and breath sounds normal. No stridor. No respiratory distress. He has no wheezes. He has no rales. He exhibits no tenderness.   Abdominal: Soft. Bowel sounds are normal. He exhibits no distension and no mass. There is no tenderness. There is no rebound and no guarding.   Musculoskeletal: Normal range of motion. He exhibits no edema or tenderness.   Lymphadenopathy:     He has no cervical adenopathy.   Neurological: He is alert and oriented to person, place, and time. He has normal reflexes. He displays normal reflexes. He exhibits normal muscle tone. Coordination normal.   Skin: Skin is warm and dry. No rash noted. He is not diaphoretic. No erythema. No pallor.   Psychiatric: He has a normal mood and affect. His behavior is normal. Judgment and thought content normal.     No visits with results within 1 Month(s) from this visit.   Latest known visit with results is:   Orders Only on 09/14/2017   Component Date Value   • WBC 09/14/2017 5.3    • RBC 09/14/2017 4.69    • Hemoglobin 09/14/2017 16.2    • Hematocrit 09/14/2017 45.3    • MCV 09/14/2017 97    • MCH 09/14/2017 34.5*   • MCHC 09/14/2017 35.8*   • RDW 09/14/2017 13.3    • Platelet Count 09/14/2017 375    • Neutrophils-Polys 09/14/2017 42    • Lymphocytes 09/14/2017 40    • Monocytes 09/14/2017  16    • Eosinophils 09/14/2017 2    • Basophils 09/14/2017 0    • Immature Cells 09/14/2017 CANCELED    • Neutrophils (Absolute) 09/14/2017 2.2    • Lymphs (Absolute) 09/14/2017 2.1    • Monos (Absolute) 09/14/2017 0.8    • Eos (Absolute) 09/14/2017 0.1    • Baso (Absolute) 09/14/2017 0.0    • Immature Granulocytes 09/14/2017 0    • Immature Granulocytes (a* 09/14/2017 0.0    • Nucleated RBC 09/14/2017 CANCELED    • Comments-Diff 09/14/2017 CANCELED    • Glucose 09/14/2017 92    • Bun 09/14/2017 10    • Creatinine 09/14/2017 0.69*   • GFR If Non  Ameri* 09/14/2017 100    • GFR If  09/14/2017 115    • Bun-Creatinine Ratio 09/14/2017 14    • Sodium 09/14/2017 133*   • Potassium 09/14/2017 4.6    • Chloride 09/14/2017 96    • Co2 09/14/2017 20    • Calcium 09/14/2017 9.5    • Total Protein 09/14/2017 7.4    • Albumin 09/14/2017 4.4    • Globulin 09/14/2017 3.0    • A-G Ratio 09/14/2017 1.5    • Total Bilirubin 09/14/2017 0.7    • Alkaline Phosphatase 09/14/2017 83    • AST(SGOT) 09/14/2017 48*   • ALT(SGPT) 09/14/2017 55*   • Cholesterol,Tot 09/14/2017 207*   • Triglycerides 09/14/2017 70    • HDL 09/14/2017 73    • VLDL Cholesterol Calc 09/14/2017 14    • LDL 09/14/2017 120*   • Comment: 09/14/2017 CANCELED    • Vitamin B12 -True Cobala* 09/14/2017 599    • WBC 03/19/2018 9.1    • RBC 03/19/2018 4.80    • Hemoglobin 03/19/2018 16.6    • Hematocrit 03/19/2018 47.8    • MCV 03/19/2018 100*   • MCH 03/19/2018 34.6*   • MCHC 03/19/2018 34.7    • RDW 03/19/2018 14.3    • Platelet Count 03/19/2018 418*   • Neutrophils-Polys 03/19/2018 62    • Lymphocytes 03/19/2018 26    • Monocytes 03/19/2018 12    • Eosinophils 03/19/2018 0    • Basophils 03/19/2018 0    • Immature Cells 03/19/2018 CANCELED    • Neutrophils (Absolute) 03/19/2018 5.6    • Lymphs (Absolute) 03/19/2018 2.4    • Monos (Absolute) 03/19/2018 1.0*   • Eos (Absolute) 03/19/2018 0.0    • Baso (Absolute) 03/19/2018 0.0    • Immature  Granulocytes 03/19/2018 0    • Immature Granulocytes (a* 03/19/2018 0.0    • Nucleated RBC 03/19/2018 CANCELED    • Comments-Diff 03/19/2018 CANCELED    • Glucose 03/19/2018 96    • Bun 03/19/2018 10    • Creatinine 03/19/2018 0.67*   • GFR If Non  Ameri* 03/19/2018 101    • GFR If  03/19/2018 117    • Bun-Creatinine Ratio 03/19/2018 15    • Sodium 03/19/2018 135    • Potassium 03/19/2018 4.4    • Chloride 03/19/2018 96    • Co2 03/19/2018 19    • Calcium 03/19/2018 9.7    • Total Protein 03/19/2018 7.6    • Albumin 03/19/2018 4.6    • Globulin 03/19/2018 3.0    • A-G Ratio 03/19/2018 1.5    • Total Bilirubin 03/19/2018 0.8    • Alkaline Phosphatase 03/19/2018 75    • AST(SGOT) 03/19/2018 24    • ALT(SGPT) 03/19/2018 35    • Cholesterol,Tot 03/19/2018 228*   • Triglycerides 03/19/2018 68    • HDL 03/19/2018 75    • VLDL Cholesterol Calc 03/19/2018 14    • LDL 03/19/2018 139*   • Comment: 03/19/2018 CANCELED    • TSH 03/19/2018 1.970       No results found for: HBA1C  Lab Results   Component Value Date/Time    SODIUM 135 03/19/2018 09:54 AM    SODIUM 128 (L) 04/28/2017 11:01 AM    POTASSIUM 4.4 03/19/2018 09:54 AM    POTASSIUM 4.1 04/28/2017 11:01 AM    CHLORIDE 96 03/19/2018 09:54 AM    CHLORIDE 94 (L) 04/28/2017 11:01 AM    CO2 19 03/19/2018 09:54 AM    CO2 26 04/28/2017 11:01 AM    GLUCOSE 96 03/19/2018 09:54 AM    GLUCOSE 83 04/28/2017 11:01 AM    BUN 10 03/19/2018 09:54 AM    BUN 15 04/28/2017 11:01 AM    CREATININE 0.67 (L) 03/19/2018 09:54 AM    CREATININE 0.63 04/28/2017 11:01 AM    CREATININE 0.88 01/24/2013 09:31 AM    BUNCREATRAT 15 03/19/2018 09:54 AM    BUNCREATRAT 11 01/24/2013 09:31 AM    ALKPHOSPHAT 75 03/19/2018 09:54 AM    ALKPHOSPHAT 82 04/27/2017 03:30 AM    ASTSGOT 24 03/19/2018 09:54 AM    ASTSGOT 37 04/27/2017 03:30 AM    ALTSGPT 35 03/19/2018 09:54 AM    ALTSGPT 48 04/27/2017 03:30 AM    TBILIRUBIN 0.8 03/19/2018 09:54 AM    TBILIRUBIN 1.3 04/27/2017 03:30 AM     Lab  Results   Component Value Date/Time    INR 0.86 (L) 04/26/2017 12:35 PM     Lab Results   Component Value Date/Time    CHOLSTRLTOT 228 (H) 03/19/2018 09:54 AM    CHOLSTRLTOT 199 01/24/2013 09:31 AM     (H) 03/19/2018 09:54 AM     (H) 01/24/2013 09:31 AM    HDL 75 03/19/2018 09:54 AM    HDL 57 01/24/2013 09:31 AM    TRIGLYCERIDE 68 03/19/2018 09:54 AM    TRIGLYCERIDE 111 01/24/2013 09:31 AM       No results found for: TESTOSTERONE  Lab Results   Component Value Date/Time    TSH 1.970 03/19/2018 09:54 AM    TSH 1.830 01/29/2014 09:34 AM     No results found for: FREET4  No results found for: URICACID  No components found for: VITB12  Lab Results   Component Value Date/Time    25HYDROXY 31.1 07/31/2014 09:32 AM    25HYDROXY 30.6 01/29/2014 09:34 AM                 Assessment/Plan:     1. Vitamin B 12 deficiency     Under good control. Continue same regimen.    2. Anxiety    Under good control. Continue same regimen.    3. Mixed hyperlipidemia    Under good control. Continue same regimen.  - COMP METABOLIC PANEL; Future  - LIPID PROFILE; Future  - CBC WITH DIFFERENTIAL; Future    4. Primary insomnia       Under good control. Continue same regimen.    5. Essential hypertension    Under good control. Continue same regimen.    30 minute face-to-face encounter took place today.  More than half of this time was spent in the coordination of care of the above problems, as well as counseling.

## 2018-09-14 LAB
ALBUMIN SERPL-MCNC: 4.3 G/DL (ref 3.6–4.8)
ALBUMIN/GLOB SERPL: 1.5 {RATIO} (ref 1.2–2.2)
ALP SERPL-CCNC: 87 IU/L (ref 39–117)
ALT SERPL-CCNC: 93 IU/L (ref 0–44)
AST SERPL-CCNC: 65 IU/L (ref 0–40)
BASOPHILS # BLD AUTO: 0.1 X10E3/UL (ref 0–0.2)
BASOPHILS NFR BLD AUTO: 2 %
BILIRUB SERPL-MCNC: 0.6 MG/DL (ref 0–1.2)
BLASTS NFR BLD: ABNORMAL %
BUN SERPL-MCNC: 7 MG/DL (ref 8–27)
BUN/CREAT SERPL: 10 (ref 10–24)
CALCIUM SERPL-MCNC: 9.3 MG/DL (ref 8.6–10.2)
CHLORIDE SERPL-SCNC: 99 MMOL/L (ref 96–106)
CHOLEST SERPL-MCNC: 173 MG/DL (ref 100–199)
CO2 SERPL-SCNC: 20 MMOL/L (ref 20–29)
CREAT SERPL-MCNC: 0.68 MG/DL (ref 0.76–1.27)
EOSINOPHIL # BLD AUTO: 0 X10E3/UL (ref 0–0.4)
EOSINOPHIL NFR BLD AUTO: 0 %
ERYTHROCYTE [DISTWIDTH] IN BLOOD BY AUTOMATED COUNT: 13.7 % (ref 12.3–15.4)
GLOBULIN SER CALC-MCNC: 2.9 G/DL (ref 1.5–4.5)
GLUCOSE SERPL-MCNC: 90 MG/DL (ref 65–99)
HCT VFR BLD AUTO: 45.8 % (ref 37.5–51)
HDLC SERPL-MCNC: 68 MG/DL
HGB BLD-MCNC: 16.1 G/DL (ref 13–17.7)
IF AFRICAN AMERICAN  100797: 115 ML/MIN/1.73
IF NON AFRICAN AMER 100791: 100 ML/MIN/1.73
IMM GRANULOCYTES # BLD: ABNORMAL 10*3/UL
IMM GRANULOCYTES NFR BLD: ABNORMAL %
IMMATURE CELLS  115398: ABNORMAL
IMMATURE CELLS NFR BLD: ABNORMAL %
LABORATORY COMMENT REPORT: NORMAL
LDLC SERPL CALC-MCNC: 92 MG/DL (ref 0–99)
LYMPHOCYTES # BLD AUTO: 1.7 X10E3/UL (ref 0.7–3.1)
LYMPHOCYTES NFR BLD AUTO: 37 %
MCH RBC QN AUTO: 35.2 PG (ref 26.6–33)
MCHC RBC AUTO-ENTMCNC: 35.2 G/DL (ref 31.5–35.7)
MCV RBC AUTO: 100 FL (ref 79–97)
MEGAKARYOCYTES NFR BLD: ABNORMAL %
METAMYELOCYTES NFR BLD: ABNORMAL %
MONOCYTES # BLD AUTO: 0.9 X10E3/UL (ref 0.1–0.9)
MONOCYTES NFR BLD AUTO: 19 %
MORPHOLOGY BLD-IMP: ABNORMAL
MYELOCYTES NFR BLD: 1 %
NEUTROPHILS # BLD AUTO: 1.9 X10E3/UL (ref 1.4–7)
NEUTROPHILS NFR BLD AUTO: 41 %
NEUTS BAND NFR BLD AUTO: ABNORMAL %
NRBC BLD AUTO-RTO: ABNORMAL %
PLATELET # BLD AUTO: 360 X10E3/UL (ref 150–379)
POTASSIUM SERPL-SCNC: 4.7 MMOL/L (ref 3.5–5.2)
PROMYELOCYTES NFR BLD: ABNORMAL %
PROT SERPL-MCNC: 7.2 G/DL (ref 6–8.5)
RBC # BLD AUTO: 4.58 X10E6/UL (ref 4.14–5.8)
SODIUM SERPL-SCNC: 135 MMOL/L (ref 134–144)
TRIGL SERPL-MCNC: 64 MG/DL (ref 0–149)
VLDLC SERPL CALC-MCNC: 13 MG/DL (ref 5–40)
WBC # BLD AUTO: 4.7 X10E3/UL (ref 3.4–10.8)

## 2018-09-20 ENCOUNTER — OFFICE VISIT (OUTPATIENT)
Dept: MEDICAL GROUP | Age: 68
End: 2018-09-20
Payer: COMMERCIAL

## 2018-09-20 VITALS
SYSTOLIC BLOOD PRESSURE: 136 MMHG | HEIGHT: 70 IN | OXYGEN SATURATION: 95 % | WEIGHT: 164 LBS | HEART RATE: 108 BPM | DIASTOLIC BLOOD PRESSURE: 78 MMHG | BODY MASS INDEX: 23.48 KG/M2 | TEMPERATURE: 98.4 F

## 2018-09-20 DIAGNOSIS — F41.9 ANXIETY: ICD-10-CM

## 2018-09-20 DIAGNOSIS — F51.01 PRIMARY INSOMNIA: ICD-10-CM

## 2018-09-20 DIAGNOSIS — E53.8 VITAMIN B 12 DEFICIENCY: ICD-10-CM

## 2018-09-20 DIAGNOSIS — Z23 NEED FOR VACCINATION: ICD-10-CM

## 2018-09-20 DIAGNOSIS — E78.2 MIXED HYPERLIPIDEMIA: ICD-10-CM

## 2018-09-20 DIAGNOSIS — I10 ESSENTIAL HYPERTENSION: ICD-10-CM

## 2018-09-20 PROCEDURE — 99214 OFFICE O/P EST MOD 30 MIN: CPT | Mod: 25 | Performed by: INTERNAL MEDICINE

## 2018-09-20 PROCEDURE — 90471 IMMUNIZATION ADMIN: CPT | Performed by: INTERNAL MEDICINE

## 2018-09-20 PROCEDURE — 90662 IIV NO PRSV INCREASED AG IM: CPT | Performed by: INTERNAL MEDICINE

## 2018-09-20 RX ORDER — TRAZODONE HYDROCHLORIDE 50 MG/1
TABLET ORAL
Qty: 90 TAB | Refills: 4 | Status: SHIPPED | OUTPATIENT
Start: 2018-09-20 | End: 2019-09-24 | Stop reason: SDUPTHER

## 2018-09-20 RX ORDER — LOVASTATIN 40 MG/1
40 TABLET ORAL DAILY
Qty: 90 TAB | Refills: 4 | Status: SHIPPED | OUTPATIENT
Start: 2018-09-20 | End: 2019-09-24 | Stop reason: SDUPTHER

## 2018-09-20 RX ORDER — ZOLPIDEM TARTRATE 10 MG/1
10 TABLET ORAL
Qty: 90 TAB | Refills: 1 | Status: CANCELLED | OUTPATIENT
Start: 2018-09-20 | End: 2018-12-19

## 2018-09-20 ASSESSMENT — ENCOUNTER SYMPTOMS
RESPIRATORY NEGATIVE: 1
MUSCULOSKELETAL NEGATIVE: 1
CARDIOVASCULAR NEGATIVE: 1
NEUROLOGICAL NEGATIVE: 1
EYES NEGATIVE: 1
PSYCHIATRIC NEGATIVE: 1
CONSTITUTIONAL NEGATIVE: 1
GASTROINTESTINAL NEGATIVE: 1

## 2018-09-20 ASSESSMENT — PATIENT HEALTH QUESTIONNAIRE - PHQ9
SUM OF ALL RESPONSES TO PHQ QUESTIONS 1-9: 4
CLINICAL INTERPRETATION OF PHQ2 SCORE: 1
5. POOR APPETITE OR OVEREATING: 0 - NOT AT ALL

## 2018-09-20 NOTE — PROGRESS NOTES
"Subjective:      Mauricio Obando is a 68 y.o. male who presents with Hyperlipidemia (FV labs)  The patient is here for followup of chronic medical problems listed below. The patient is compliant with medications and having no side effects from them. Denies chest pain, abdominal pain, dyspnea, myalgias, or cough.   Patient Active Problem List    Diagnosis Date Noted   • Essential hypertension- borderline no meds 03/22/2018   • Vitamin B 12 deficiency 08/12/2016   • Anxiety 08/02/2013   • Mixed hyperlipidemia 05/01/2012   • Primary insomnia 05/01/2012     No Known Allergies  Outpatient Medications Prior to Visit   Medication Sig Dispense Refill   • ALPRAZolam (XANAX) 0.25 MG Tab TAKE 1 TABLET BY MOUTH THREE TIMES DAILY AS NEEDED 90 Tab 0   • cyanocobalamin (VITAMIN B12) 1000 MCG Tab Take 1 Tab by mouth every day. 100 Tab 4   • lovastatin (MEVACOR) 40 MG tablet Take 1 Tab by mouth every day. 90 Tab 4   • trazodone (DESYREL) 50 MG Tab TAKE 1 TABLET BY MOUTH EVERY NIGHT AT BEDTIME 90 Tab 4   • acetaminophen (TYLENOL) 325 MG Tab Take 2 Tabs by mouth every 6 hours. 30 Tab 0     No facility-administered medications prior to visit.                HPI    Review of Systems   Constitutional: Negative.    HENT: Negative.    Eyes: Negative.    Respiratory: Negative.    Cardiovascular: Negative.    Gastrointestinal: Negative.    Genitourinary: Negative.    Musculoskeletal: Negative.    Skin: Negative.    Neurological: Negative.    Endo/Heme/Allergies: Negative.    Psychiatric/Behavioral: Negative.           Objective:     /78 (BP Location: Left arm, Patient Position: Sitting)   Pulse (!) 108   Temp 36.9 °C (98.4 °F)   Ht 1.778 m (5' 10\")   Wt 74.4 kg (164 lb)   SpO2 95%   BMI 23.53 kg/m²      Physical Exam   Constitutional: He is oriented to person, place, and time. He appears well-developed and well-nourished. No distress.   HENT:   Head: Normocephalic and atraumatic.   Right Ear: External ear normal.   Left Ear: External " ear normal.   Nose: Nose normal.   Mouth/Throat: Oropharynx is clear and moist. No oropharyngeal exudate.   Eyes: Pupils are equal, round, and reactive to light. Conjunctivae and EOM are normal. Right eye exhibits no discharge. Left eye exhibits no discharge. No scleral icterus.   Neck: Normal range of motion. Neck supple. No JVD present. No tracheal deviation present. No thyromegaly present.   Cardiovascular: Normal rate, regular rhythm, normal heart sounds and intact distal pulses.  Exam reveals no gallop and no friction rub.    No murmur heard.  Pulmonary/Chest: Effort normal and breath sounds normal. No stridor. No respiratory distress. He has no wheezes. He has no rales. He exhibits no tenderness.   Abdominal: Soft. Bowel sounds are normal. He exhibits no distension and no mass. There is no tenderness. There is no rebound and no guarding.   Musculoskeletal: Normal range of motion. He exhibits no edema or tenderness.   Lymphadenopathy:     He has no cervical adenopathy.   Neurological: He is alert and oriented to person, place, and time. He has normal reflexes. He displays normal reflexes. He exhibits normal muscle tone. Coordination normal.   Skin: Skin is warm and dry. No rash noted. He is not diaphoretic. No erythema. No pallor.   Psychiatric: He has a normal mood and affect. His behavior is normal. Judgment and thought content normal.               No visits with results within 1 Month(s) from this visit.   Latest known visit with results is:   Orders Only on 09/14/2017   Component Date Value   • WBC 09/14/2017 5.3    • RBC 09/14/2017 4.69    • Hemoglobin 09/14/2017 16.2    • Hematocrit 09/14/2017 45.3    • MCV 09/14/2017 97    • MCH 09/14/2017 34.5*   • MCHC 09/14/2017 35.8*   • RDW 09/14/2017 13.3    • Platelet Count 09/14/2017 375    • Neutrophils-Polys 09/14/2017 42    • Lymphocytes 09/14/2017 40    • Monocytes 09/14/2017 16    • Eosinophils 09/14/2017 2    • Basophils 09/14/2017 0    • Immature Cells  09/14/2017 CANCELED    • Neutrophils (Absolute) 09/14/2017 2.2    • Lymphs (Absolute) 09/14/2017 2.1    • Monos (Absolute) 09/14/2017 0.8    • Eos (Absolute) 09/14/2017 0.1    • Baso (Absolute) 09/14/2017 0.0    • Immature Granulocytes 09/14/2017 0    • Immature Granulocytes (a* 09/14/2017 0.0    • Nucleated RBC 09/14/2017 CANCELED    • Comments-Diff 09/14/2017 CANCELED    • Glucose 09/14/2017 92    • Bun 09/14/2017 10    • Creatinine 09/14/2017 0.69*   • GFR If Non  Ameri* 09/14/2017 100    • GFR If  09/14/2017 115    • Bun-Creatinine Ratio 09/14/2017 14    • Sodium 09/14/2017 133*   • Potassium 09/14/2017 4.6    • Chloride 09/14/2017 96    • Co2 09/14/2017 20    • Calcium 09/14/2017 9.5    • Total Protein 09/14/2017 7.4    • Albumin 09/14/2017 4.4    • Globulin 09/14/2017 3.0    • A-G Ratio 09/14/2017 1.5    • Total Bilirubin 09/14/2017 0.7    • Alkaline Phosphatase 09/14/2017 83    • AST(SGOT) 09/14/2017 48*   • ALT(SGPT) 09/14/2017 55*   • Cholesterol,Tot 09/14/2017 207*   • Triglycerides 09/14/2017 70    • HDL 09/14/2017 73    • VLDL Cholesterol Calc 09/14/2017 14    • LDL 09/14/2017 120*   • Comment: 09/14/2017 CANCELED    • Vitamin B12 -True Cobala* 09/14/2017 599    • WBC 03/19/2018 9.1    • RBC 03/19/2018 4.80    • Hemoglobin 03/19/2018 16.6    • Hematocrit 03/19/2018 47.8    • MCV 03/19/2018 100*   • MCH 03/19/2018 34.6*   • MCHC 03/19/2018 34.7    • RDW 03/19/2018 14.3    • Platelet Count 03/19/2018 418*   • Neutrophils-Polys 03/19/2018 62    • Lymphocytes 03/19/2018 26    • Monocytes 03/19/2018 12    • Eosinophils 03/19/2018 0    • Basophils 03/19/2018 0    • Immature Cells 03/19/2018 CANCELED    • Neutrophils (Absolute) 03/19/2018 5.6    • Lymphs (Absolute) 03/19/2018 2.4    • Monos (Absolute) 03/19/2018 1.0*   • Eos (Absolute) 03/19/2018 0.0    • Baso (Absolute) 03/19/2018 0.0    • Immature Granulocytes 03/19/2018 0    • Immature Granulocytes (a* 03/19/2018 0.0    • Nucleated RBC  03/19/2018 CANCELED    • Comments-Diff 03/19/2018 CANCELED    • Glucose 03/19/2018 96    • Bun 03/19/2018 10    • Creatinine 03/19/2018 0.67*   • GFR If Non  Ameri* 03/19/2018 101    • GFR If  03/19/2018 117    • Bun-Creatinine Ratio 03/19/2018 15    • Sodium 03/19/2018 135    • Potassium 03/19/2018 4.4    • Chloride 03/19/2018 96    • Co2 03/19/2018 19    • Calcium 03/19/2018 9.7    • Total Protein 03/19/2018 7.6    • Albumin 03/19/2018 4.6    • Globulin 03/19/2018 3.0    • A-G Ratio 03/19/2018 1.5    • Total Bilirubin 03/19/2018 0.8    • Alkaline Phosphatase 03/19/2018 75    • AST(SGOT) 03/19/2018 24    • ALT(SGPT) 03/19/2018 35    • Cholesterol,Tot 03/19/2018 228*   • Triglycerides 03/19/2018 68    • HDL 03/19/2018 75    • VLDL Cholesterol Calc 03/19/2018 14    • LDL 03/19/2018 139*   • Comment: 03/19/2018 CANCELED    • TSH 03/19/2018 1.970    • WBC 09/13/2018 4.7    • RBC 09/13/2018 4.58    • Hemoglobin 09/13/2018 16.1    • Hematocrit 09/13/2018 45.8    • MCV 09/13/2018 100*   • MCH 09/13/2018 35.2*   • MCHC 09/13/2018 35.2    • RDW 09/13/2018 13.7    • Platelet Count 09/13/2018 360    • Neutrophils-Polys 09/13/2018 41    • Lymphocytes 09/13/2018 37    • Monocytes 09/13/2018 19    • Eosinophils 09/13/2018 0    • Basophils 09/13/2018 2    • Immature Cells 09/13/2018 Note    • Neutrophils (Absolute) 09/13/2018 1.9    • Lymphs (Absolute) 09/13/2018 1.7    • Monos (Absolute) 09/13/2018 0.9    • Eos (Absolute) 09/13/2018 0.0    • Baso (Absolute) 09/13/2018 0.1    • Immature Granulocytes 09/13/2018 CANCELED    • Immature Granulocytes (a* 09/13/2018 CANCELED    • Nucleated RBC 09/13/2018 CANCELED    • Comments-Diff 09/13/2018 Note:    • Bands-Stabs 09/13/2018 CANCELED    • Metamyelocytes 09/13/2018 CANCELED    • Myelocytes 09/13/2018 1*   • Progranulocytes 09/13/2018 CANCELED    • Blasts 09/13/2018 CANCELED    • Megakaryocytes 09/13/2018 CANCELED    • Other 09/13/2018 CANCELED    • Glucose  09/13/2018 90    • Bun 09/13/2018 7*   • Creatinine 09/13/2018 0.68*   • GFR If Non  Ameri* 09/13/2018 100    • GFR If  09/13/2018 115    • Bun-Creatinine Ratio 09/13/2018 10    • Sodium 09/13/2018 135    • Potassium 09/13/2018 4.7    • Chloride 09/13/2018 99    • Co2 09/13/2018 20    • Calcium 09/13/2018 9.3    • Total Protein 09/13/2018 7.2    • Albumin 09/13/2018 4.3    • Globulin 09/13/2018 2.9    • A-G Ratio 09/13/2018 1.5    • Total Bilirubin 09/13/2018 0.6    • Alkaline Phosphatase 09/13/2018 87    • AST(SGOT) 09/13/2018 65*   • ALT(SGPT) 09/13/2018 93*   • Cholesterol,Tot 09/13/2018 173    • Triglycerides 09/13/2018 64    • HDL 09/13/2018 68    • VLDL Cholesterol Calc 09/13/2018 13    • LDL 09/13/2018 92    • Comment: 09/13/2018 CANCELED       No results found for: HBA1C  Lab Results   Component Value Date/Time    SODIUM 135 09/13/2018 08:59 AM    SODIUM 128 (L) 04/28/2017 11:01 AM    POTASSIUM 4.7 09/13/2018 08:59 AM    POTASSIUM 4.1 04/28/2017 11:01 AM    CHLORIDE 99 09/13/2018 08:59 AM    CHLORIDE 94 (L) 04/28/2017 11:01 AM    CO2 20 09/13/2018 08:59 AM    CO2 26 04/28/2017 11:01 AM    GLUCOSE 90 09/13/2018 08:59 AM    GLUCOSE 83 04/28/2017 11:01 AM    BUN 7 (L) 09/13/2018 08:59 AM    BUN 15 04/28/2017 11:01 AM    CREATININE 0.68 (L) 09/13/2018 08:59 AM    CREATININE 0.63 04/28/2017 11:01 AM    CREATININE 0.88 01/24/2013 09:31 AM    BUNCREATRAT 10 09/13/2018 08:59 AM    BUNCREATRAT 11 01/24/2013 09:31 AM    ALKPHOSPHAT 87 09/13/2018 08:59 AM    ALKPHOSPHAT 82 04/27/2017 03:30 AM    ASTSGOT 65 (H) 09/13/2018 08:59 AM    ASTSGOT 37 04/27/2017 03:30 AM    ALTSGPT 93 (H) 09/13/2018 08:59 AM    ALTSGPT 48 04/27/2017 03:30 AM    TBILIRUBIN 0.6 09/13/2018 08:59 AM    TBILIRUBIN 1.3 04/27/2017 03:30 AM     Lab Results   Component Value Date/Time    INR 0.86 (L) 04/26/2017 12:35 PM     Lab Results   Component Value Date/Time    CHOLSTRLTOT 173 09/13/2018 08:59 AM    CHOLSTRLTOT 199  01/24/2013 09:31 AM    LDL 92 09/13/2018 08:59 AM     (H) 01/24/2013 09:31 AM    HDL 68 09/13/2018 08:59 AM    HDL 57 01/24/2013 09:31 AM    TRIGLYCERIDE 64 09/13/2018 08:59 AM    TRIGLYCERIDE 111 01/24/2013 09:31 AM       No results found for: TESTOSTERONE  Lab Results   Component Value Date/Time    TSH 1.970 03/19/2018 09:54 AM    TSH 1.830 01/29/2014 09:34 AM     No results found for: FREET4  No results found for: URICACID  No components found for: VITB12  Lab Results   Component Value Date/Time    25HYDROXY 31.1 07/31/2014 09:32 AM    25HYDROXY 30.6 01/29/2014 09:34 AM       Assessment/Plan:     1. Need for vaccination      - INFLUENZA VACCINE, HIGH DOSE (65+ ONLY)    2. Mixed hyperlipidemia   Under good control. Continue same regimen.    - lovastatin (MEVACOR) 40 MG tablet; Take 1 Tab by mouth every day.  Dispense: 90 Tab; Refill: 4  - COMP METABOLIC PANEL; Future  - LIPID PROFILE; Future  - CBC WITH DIFFERENTIAL; Future    3. Primary insomnia    Under good control. Continue same regimen.    - traZODone (DESYREL) 50 MG Tab; TAKE 1 TABLET BY MOUTH EVERY NIGHT AT BEDTIME  Dispense: 90 Tab; Refill: 4    4. Anxiety     Under good control. Continue same regimen.    5. Essential hypertension- borderline no meds   Under good control. Continue same regimen.'      6. Vitamin B 12 deficiency       Under good control. Continue same regimen.      30 minute face-to-face encounter took place today.  More than half of this time was spent in the coordination of care of the above problems, as well as counseling.

## 2018-10-30 DIAGNOSIS — F41.9 ANXIETY: ICD-10-CM

## 2018-10-30 RX ORDER — ALPRAZOLAM 0.25 MG/1
0.25 TABLET ORAL 3 TIMES DAILY PRN
Qty: 90 TAB | Refills: 0 | Status: SHIPPED | OUTPATIENT
Start: 2018-10-30 | End: 2019-01-28

## 2018-11-01 NOTE — TELEPHONE ENCOUNTER
Phone Number Called: [phone]    Message: Called spoke with patient in regards to making an appointment for refills on medication. Per patient he will not like to make an appointment at this time, he will keep appointment for September 2019. Patient aware refills will not be given without seeing PCP.     Left Message for patient to call back: no

## 2018-11-15 DIAGNOSIS — F51.01 PRIMARY INSOMNIA: ICD-10-CM

## 2018-11-15 RX ORDER — ZOLPIDEM TARTRATE 10 MG/1
TABLET ORAL
Qty: 30 EACH | Refills: 0 | Status: SHIPPED | OUTPATIENT
Start: 2018-11-15 | End: 2018-12-15

## 2019-03-14 ENCOUNTER — TELEPHONE (OUTPATIENT)
Dept: MEDICAL GROUP | Age: 69
End: 2019-03-14

## 2019-03-14 NOTE — TELEPHONE ENCOUNTER
"Patient called and left voicemail upset that no response was given on his Zolpidem request.    Called pharmacy for more info, they state that a PA was needed because \" limit was exceeded\". She stated that that was weird because it was only for 30 days. Pharmacy will contact insurance company.    Called patient but unable to leave voicemail due to it not being set up.      "

## 2019-09-19 LAB
ALBUMIN SERPL-MCNC: 4.3 G/DL (ref 3.6–4.8)
ALBUMIN/GLOB SERPL: 1.4 {RATIO} (ref 1.2–2.2)
ALP SERPL-CCNC: 68 IU/L (ref 39–117)
ALT SERPL-CCNC: 49 IU/L (ref 0–44)
AST SERPL-CCNC: 43 IU/L (ref 0–40)
BASOPHILS # BLD AUTO: 0 X10E3/UL (ref 0–0.2)
BASOPHILS NFR BLD AUTO: 1 %
BILIRUB SERPL-MCNC: 0.7 MG/DL (ref 0–1.2)
BUN SERPL-MCNC: 7 MG/DL (ref 8–27)
BUN/CREAT SERPL: 9 (ref 10–24)
CALCIUM SERPL-MCNC: 9.6 MG/DL (ref 8.6–10.2)
CHLORIDE SERPL-SCNC: 100 MMOL/L (ref 96–106)
CHOLEST SERPL-MCNC: 187 MG/DL (ref 100–199)
CO2 SERPL-SCNC: 20 MMOL/L (ref 20–29)
CREAT SERPL-MCNC: 0.75 MG/DL (ref 0.76–1.27)
EOSINOPHIL # BLD AUTO: 0.1 X10E3/UL (ref 0–0.4)
EOSINOPHIL NFR BLD AUTO: 1 %
ERYTHROCYTE [DISTWIDTH] IN BLOOD BY AUTOMATED COUNT: 14 % (ref 12.3–15.4)
GLOBULIN SER CALC-MCNC: 3.1 G/DL (ref 1.5–4.5)
GLUCOSE SERPL-MCNC: 86 MG/DL (ref 65–99)
HCT VFR BLD AUTO: 47 % (ref 37.5–51)
HDLC SERPL-MCNC: 61 MG/DL
HGB BLD-MCNC: 16.1 G/DL (ref 13–17.7)
IMM GRANULOCYTES # BLD AUTO: 0 X10E3/UL (ref 0–0.1)
IMM GRANULOCYTES NFR BLD AUTO: 1 %
IMMATURE CELLS  115398: ABNORMAL
LABORATORY COMMENT REPORT: ABNORMAL
LDLC SERPL CALC-MCNC: 116 MG/DL (ref 0–99)
LYMPHOCYTES # BLD AUTO: 2.6 X10E3/UL (ref 0.7–3.1)
LYMPHOCYTES NFR BLD AUTO: 36 %
MCH RBC QN AUTO: 34.5 PG (ref 26.6–33)
MCHC RBC AUTO-ENTMCNC: 34.3 G/DL (ref 31.5–35.7)
MCV RBC AUTO: 101 FL (ref 79–97)
MONOCYTES # BLD AUTO: 0.8 X10E3/UL (ref 0.1–0.9)
MONOCYTES NFR BLD AUTO: 11 %
MORPHOLOGY BLD-IMP: ABNORMAL
NEUTROPHILS # BLD AUTO: 3.6 X10E3/UL (ref 1.4–7)
NEUTROPHILS NFR BLD AUTO: 50 %
NRBC BLD AUTO-RTO: ABNORMAL %
PLATELET # BLD AUTO: 410 X10E3/UL (ref 150–450)
POTASSIUM SERPL-SCNC: 4.9 MMOL/L (ref 3.5–5.2)
PROT SERPL-MCNC: 7.4 G/DL (ref 6–8.5)
RBC # BLD AUTO: 4.67 X10E6/UL (ref 4.14–5.8)
SODIUM SERPL-SCNC: 135 MMOL/L (ref 134–144)
TRIGL SERPL-MCNC: 50 MG/DL (ref 0–149)
VLDLC SERPL CALC-MCNC: 10 MG/DL (ref 5–40)
WBC # BLD AUTO: 7.2 X10E3/UL (ref 3.4–10.8)

## 2019-09-24 ENCOUNTER — OFFICE VISIT (OUTPATIENT)
Dept: MEDICAL GROUP | Age: 69
End: 2019-09-24
Payer: COMMERCIAL

## 2019-09-24 VITALS
BODY MASS INDEX: 23.82 KG/M2 | OXYGEN SATURATION: 93 % | HEART RATE: 92 BPM | TEMPERATURE: 97 F | WEIGHT: 166.4 LBS | HEIGHT: 70 IN | DIASTOLIC BLOOD PRESSURE: 76 MMHG | SYSTOLIC BLOOD PRESSURE: 142 MMHG

## 2019-09-24 DIAGNOSIS — Z23 NEED FOR VACCINATION: ICD-10-CM

## 2019-09-24 DIAGNOSIS — F51.01 PRIMARY INSOMNIA: ICD-10-CM

## 2019-09-24 DIAGNOSIS — J32.9 CHRONIC SINUSITIS WITH RECURRENT BRONCHITIS: ICD-10-CM

## 2019-09-24 DIAGNOSIS — J40 CHRONIC SINUSITIS WITH RECURRENT BRONCHITIS: ICD-10-CM

## 2019-09-24 DIAGNOSIS — E78.2 MIXED HYPERLIPIDEMIA: ICD-10-CM

## 2019-09-24 PROCEDURE — 90662 IIV NO PRSV INCREASED AG IM: CPT | Performed by: INTERNAL MEDICINE

## 2019-09-24 PROCEDURE — 90471 IMMUNIZATION ADMIN: CPT | Performed by: INTERNAL MEDICINE

## 2019-09-24 PROCEDURE — 99214 OFFICE O/P EST MOD 30 MIN: CPT | Mod: 25 | Performed by: INTERNAL MEDICINE

## 2019-09-24 RX ORDER — ZOLPIDEM TARTRATE 10 MG/1
TABLET ORAL
Qty: 30 TAB | Refills: 5 | Status: SHIPPED | OUTPATIENT
Start: 2019-09-24 | End: 2019-09-24

## 2019-09-24 RX ORDER — CEPHALEXIN 500 MG/1
500 CAPSULE ORAL 4 TIMES DAILY
Qty: 40 CAP | Refills: 0 | Status: SHIPPED | OUTPATIENT
Start: 2019-09-24 | End: 2020-07-14

## 2019-09-24 RX ORDER — TRAZODONE HYDROCHLORIDE 50 MG/1
TABLET ORAL
Qty: 90 TAB | Refills: 4 | Status: SHIPPED | OUTPATIENT
Start: 2019-09-24 | End: 2020-10-12 | Stop reason: SDUPTHER

## 2019-09-24 RX ORDER — LOVASTATIN 40 MG/1
40 TABLET ORAL DAILY
Qty: 90 TAB | Refills: 4 | Status: SHIPPED | OUTPATIENT
Start: 2019-09-24 | End: 2020-10-12 | Stop reason: SDUPTHER

## 2019-09-24 ASSESSMENT — ENCOUNTER SYMPTOMS
RESPIRATORY NEGATIVE: 1
MUSCULOSKELETAL NEGATIVE: 1
GASTROINTESTINAL NEGATIVE: 1
CONSTITUTIONAL NEGATIVE: 1
EYES NEGATIVE: 1
PSYCHIATRIC NEGATIVE: 1
CARDIOVASCULAR NEGATIVE: 1
NEUROLOGICAL NEGATIVE: 1

## 2019-09-24 ASSESSMENT — PATIENT HEALTH QUESTIONNAIRE - PHQ9: CLINICAL INTERPRETATION OF PHQ2 SCORE: 0

## 2019-09-24 ASSESSMENT — PAIN SCALES - GENERAL: PAINLEVEL: NO PAIN

## 2019-09-24 NOTE — PROGRESS NOTES
Subjective:      Mauricio Obando is a 69 y.o. male who presents with Anxiety (med refill) and Hyperlipidemia (1 yr Fv )        HPI    The patient is here for followup of chronic medical problems listed below. The patient is compliant with medications and having no side effects from them. Denies chest pain, abdominal pain, dyspnea, myalgias, or cough.    Primary insomnia  Patient is taking trazadone 50 mg as needed, which he is tolerating well without side effects. He is requesting to continue this as he states it usually works well, however notes there are times when it doesn't put him to sleep entirely, and he would use ambien. However, due to complications with his pharmacy he is no longer using ambien and has been off it for the last six months.    Dyslipidemia  Patient is taking lovastatin 40 mg, which he is tolerating well without side effects. Lipid panel from 9/18/19 shows his LDL is elevated however all other results are within normal limits.    Chronic sinusitis with recurrent bronchitis  Patient has a history of chronic reoccurring sinusitis, and states that every time he gets the flu or other infection, he develops a full sinus infection. Given this, he would normally keep a bottle of Keflex 500 mg at home to treat these infections, however has run out and is now requesting to have a refill.     Patient Active Problem List   Diagnosis   • Mixed hyperlipidemia   • Primary insomnia   • Anxiety   • Vitamin B 12 deficiency   • Essential hypertension- borderline no meds       Outpatient Medications Prior to Visit   Medication Sig Dispense Refill   • lovastatin (MEVACOR) 40 MG tablet Take 1 Tab by mouth every day. 90 Tab 4   • traZODone (DESYREL) 50 MG Tab TAKE 1 TABLET BY MOUTH EVERY NIGHT AT BEDTIME 90 Tab 4   • cyanocobalamin (VITAMIN B12) 1000 MCG Tab Take 1 Tab by mouth every day. 100 Tab 4   • acetaminophen (TYLENOL) 325 MG Tab Take 2 Tabs by mouth every 6 hours. 30 Tab 0     No facility-administered  "medications prior to visit.         No Known Allergies    Review of Systems   Constitutional: Negative.    HENT: Negative.    Eyes: Negative.    Respiratory: Negative.    Cardiovascular: Negative.    Gastrointestinal: Negative.    Genitourinary: Negative.    Musculoskeletal: Negative.    Skin: Negative.    Neurological: Negative.    Endo/Heme/Allergies: Negative.    Psychiatric/Behavioral: Negative.    All other systems reviewed and are negative.       Objective:     /76   Pulse 92   Temp 36.1 °C (97 °F) (Temporal)   Ht 1.778 m (5' 10\")   Wt 75.5 kg (166 lb 6.4 oz)   SpO2 93%   BMI 23.88 kg/m²     Physical Exam   Constitutional: Oriented to person, place, and time. Appears well-developed and well-nourished. No distress.   Head: Normocephalic and atraumatic.   Right Ear: External ear normal.   Left Ear: External ear normal.   Nose: Nose normal.   Mouth/Throat: Oropharynx is clear and moist. No oropharyngeal exudate.   Eyes: Pupils are equal, round, and reactive to light. Conjunctivae and EOM are normal. Right eye exhibits no discharge. Left eye exhibits no discharge. No scleral icterus.   Neck: Normal range of motion. Neck supple. No JVD present. No tracheal deviation present. No thyromegaly present.   Cardiovascular: Normal rate, regular rhythm, normal heart sounds and intact distal pulses.  Exam reveals no gallop and no friction rub.    No murmur heard.  Pulmonary/Chest: Effort normal. No stridor. No respiratory distress. No wheezing or rales. No tenderness.   Abdominal: Soft. Bowel sounds are normal. No distension and no mass. There is no tenderness. There is no rebound and no guarding. No hernia.   Musculoskeletal: Normal range of motion No edema or tenderness.   Lymphadenopathy: No cervical adenopathy.   Neurological: Alert and oriented to person, place, and time. Normal reflexes. Normal reflexes. No cranial nerve deficit. Normal muscle tone. Coordination normal.   Skin: Skin is warm and dry. No rash " noted. Not diaphoretic. No erythema. No pallor.   Psychiatric: Normal mood and affect. Behavior is normal. Judgment and thought content normal.   Nursing note and vitals reviewed.      No results found for: HBA1C  Lab Results   Component Value Date/Time    SODIUM 135 09/18/2019 07:46 AM    SODIUM 128 (L) 04/28/2017 11:01 AM    POTASSIUM 4.9 09/18/2019 07:46 AM    POTASSIUM 4.1 04/28/2017 11:01 AM    CHLORIDE 100 09/18/2019 07:46 AM    CHLORIDE 94 (L) 04/28/2017 11:01 AM    CO2 20 09/18/2019 07:46 AM    CO2 26 04/28/2017 11:01 AM    GLUCOSE 86 09/18/2019 07:46 AM    GLUCOSE 83 04/28/2017 11:01 AM    BUN 7 (L) 09/18/2019 07:46 AM    BUN 15 04/28/2017 11:01 AM    CREATININE 0.75 (L) 09/18/2019 07:46 AM    CREATININE 0.63 04/28/2017 11:01 AM    CREATININE 0.88 01/24/2013 09:31 AM    BUNCREATRAT 9 (L) 09/18/2019 07:46 AM    BUNCREATRAT 11 01/24/2013 09:31 AM    ALKPHOSPHAT 68 09/18/2019 07:46 AM    ALKPHOSPHAT 82 04/27/2017 03:30 AM    ASTSGOT 43 (H) 09/18/2019 07:46 AM    ASTSGOT 37 04/27/2017 03:30 AM    ALTSGPT 49 (H) 09/18/2019 07:46 AM    ALTSGPT 48 04/27/2017 03:30 AM    TBILIRUBIN 0.7 09/18/2019 07:46 AM    TBILIRUBIN 1.3 04/27/2017 03:30 AM     Lab Results   Component Value Date/Time    INR 0.86 (L) 04/26/2017 12:35 PM     Lab Results   Component Value Date/Time    CHOLSTRLTOT 187 09/18/2019 07:46 AM    CHOLSTRLTOT 199 01/24/2013 09:31 AM     (H) 09/18/2019 07:46 AM     (H) 01/24/2013 09:31 AM    HDL 61 09/18/2019 07:46 AM    HDL 57 01/24/2013 09:31 AM    TRIGLYCERIDE 50 09/18/2019 07:46 AM    TRIGLYCERIDE 111 01/24/2013 09:31 AM       No results found for: TESTOSTERONE  Lab Results   Component Value Date/Time    TSH 1.970 03/19/2018 09:54 AM    TSH 1.830 01/29/2014 09:34 AM     No results found for: FREET4  No results found for: URICACID  No components found for: VITB12  Lab Results   Component Value Date/Time    25HYDROXY 31.1 07/31/2014 09:32 AM    25HYDROXY 30.6 01/29/2014 09:34 AM           Assessment/Plan:     1. Primary insomnia  - Under good control. Continue same regimen of trazadone and avoid ambien use. Use two if needed.  - traZODone (DESYREL) 50 MG Tab; TAKE 1 TABLET BY MOUTH EVERY NIGHT AT BEDTIME  Dispense: 90 Tab; Refill: 4    2. Need for vaccination  - vaccine was given today after reviewing risks and benefits as well as side effects of vaccine.  - Influenza Vaccine, High Dose (65+ Only)    3. Mixed hyperlipidemia  - Under good control. Continue same regimen of lovastatin.  diet/exercise/lose 15 lbs.; patient counseled, and avoid alcohol and salt.  - lovastatin (MEVACOR) 40 MG tablet; Take 1 Tab by mouth every day.  Dispense: 90 Tab; Refill: 4    4. Chronic sinusitis with recurrent bronchitis  - Patient will have his Keflex refilled to help his chronic sinusitis infections.  - cephALEXin (KEFLEX) 500 MG Cap; Take 1 Cap by mouth 4 times a day for 10 days.  Dispense: 40 Cap; Refill: 0       30 minute face-to-face encounter took place today.  More than half of this time was spent in the coordination of care of the above problems, as well as counseling.     Mariano BUENO (Micheleibluis), am scribing for, and in the presence of, Javon Palacios M.D..    Electronically signed by: Mariano Venegas (Karis), 9/24/2019    Javon BUENO M.D., personally performed the services described in this documentation, as scribed by Mariano Venegas in my presence, and it is both accurate and complete.

## 2019-10-31 ENCOUNTER — OFFICE VISIT (OUTPATIENT)
Dept: MEDICAL GROUP | Age: 69
End: 2019-10-31
Payer: COMMERCIAL

## 2019-10-31 VITALS
SYSTOLIC BLOOD PRESSURE: 140 MMHG | TEMPERATURE: 97.2 F | HEIGHT: 70 IN | WEIGHT: 170 LBS | OXYGEN SATURATION: 93 % | HEART RATE: 99 BPM | BODY MASS INDEX: 24.34 KG/M2 | DIASTOLIC BLOOD PRESSURE: 88 MMHG

## 2019-10-31 DIAGNOSIS — F51.01 PRIMARY INSOMNIA: ICD-10-CM

## 2019-10-31 DIAGNOSIS — E78.2 MIXED HYPERLIPIDEMIA: ICD-10-CM

## 2019-10-31 DIAGNOSIS — F41.9 ANXIETY: ICD-10-CM

## 2019-10-31 DIAGNOSIS — E53.8 VITAMIN B 12 DEFICIENCY: ICD-10-CM

## 2019-10-31 DIAGNOSIS — Z23 NEED FOR VACCINATION: ICD-10-CM

## 2019-10-31 DIAGNOSIS — I10 ESSENTIAL HYPERTENSION: ICD-10-CM

## 2019-10-31 PROCEDURE — 90732 PPSV23 VACC 2 YRS+ SUBQ/IM: CPT | Performed by: INTERNAL MEDICINE

## 2019-10-31 PROCEDURE — 99214 OFFICE O/P EST MOD 30 MIN: CPT | Mod: 25 | Performed by: INTERNAL MEDICINE

## 2019-10-31 PROCEDURE — 90471 IMMUNIZATION ADMIN: CPT | Performed by: INTERNAL MEDICINE

## 2019-10-31 RX ORDER — LISINOPRIL 10 MG/1
10 TABLET ORAL DAILY
Qty: 90 TAB | Refills: 4 | Status: SHIPPED | OUTPATIENT
Start: 2019-10-31 | End: 2020-02-20

## 2019-10-31 RX ORDER — LISINOPRIL 10 MG/1
10 TABLET ORAL DAILY
Qty: 90 TAB | Refills: 4 | Status: SHIPPED | OUTPATIENT
Start: 2019-10-31 | End: 2019-10-31 | Stop reason: SDUPTHER

## 2019-10-31 NOTE — PROGRESS NOTES
"Subjective:      Mauricio Obando is a 69 y.o. male who presents with Hypertension (Follow up )        HPI    The patient is here for followup of chronic medical problems listed below. The patient is compliant with medications and having no side effects from them. Denies chest pain, abdominal pain, dyspnea, myalgias, or cough.    Essential hypertension  New onset. Patient had elevated BP of 142/76 at last visit and today BP is 140/88, 144/84. He's been monitoring his BP at home with average systolic of 140. He is not currently taking an antihypertensive, attempting to control with low sodium diet. He reports decreasing his alcohol intake to \"a couple of beers a day\". He denies any chest pain, headaches, or palpitations. Today I have initiated him on lisinopril 10 mg QN.    Mixed hyperlipidemia  Chronic. Patient reports compliancy with lovastatin 40 mg QN and denies any associated side effects. Most recent blood work completed 9/18/19 with tot 187; tri 50; HDL 61; . Patient denies any chest pain or claudication.    Primary insomnia  Chronic. Patient reports compliancy with trazodone 50 mg QD and denies any associated side effects. He reports good symptom control with current regimen.    Vitamin B 12 deficiency  Chronic. Patient reports compliancy with cyanocobalamin 1000 mcg QD and denies any associated side effects. Blood work completed 9/14/17 indicated vitamin B12 level of 599.    Patient Active Problem List   Diagnosis   • Mixed hyperlipidemia   • Primary insomnia   • Anxiety   • Vitamin B 12 deficiency   • Essential hypertension- borderline no meds       Outpatient Medications Prior to Visit   Medication Sig Dispense Refill   • lovastatin (MEVACOR) 40 MG tablet Take 1 Tab by mouth every day. 90 Tab 4   • traZODone (DESYREL) 50 MG Tab TAKE 1 TABLET BY MOUTH EVERY NIGHT AT BEDTIME 90 Tab 4   • cyanocobalamin (VITAMIN B12) 1000 MCG Tab Take 1 Tab by mouth every day. 100 Tab 4   • acetaminophen (TYLENOL) 325 MG Tab " "Take 2 Tabs by mouth every 6 hours. 30 Tab 0     No facility-administered medications prior to visit.         No Known Allergies    Review of Systems   All other systems reviewed and are negative.           Objective:     /88 (BP Location: Left arm, Patient Position: Sitting, BP Cuff Size: Adult)   Pulse 99   Temp 36.2 °C (97.2 °F) (Temporal)   Ht 1.778 m (5' 10\")   Wt 77.1 kg (170 lb)   SpO2 93%   BMI 24.39 kg/m²     Physical Exam   Constitutional: Oriented to person, place, and time. Appears well-developed and well-nourished. No distress.   Head: Normocephalic and atraumatic.   Right Ear: External ear normal.   Left Ear: External ear normal.   Nose: Nose normal.   Mouth/Throat: Oropharynx is clear and moist. No oropharyngeal exudate.   Eyes: Pupils are equal, round, and reactive to light. Conjunctivae and EOM are normal. Right eye exhibits no discharge. Left eye exhibits no discharge. No scleral icterus.   Neck: Normal range of motion. Neck supple. No JVD present. No tracheal deviation present. No thyromegaly present.   Cardiovascular: Normal rate, regular rhythm, normal heart sounds and intact distal pulses.  Exam reveals no gallop and no friction rub.    No murmur heard.  Pulmonary/Chest: Effort normal. No stridor. No respiratory distress. No wheezing or rales. No tenderness.   Abdominal: Soft. Bowel sounds are normal. No distension and no mass. There is no tenderness. There is no rebound and no guarding. No hernia.   Musculoskeletal: Normal range of motion No edema or tenderness.   Lymphadenopathy: No cervical adenopathy.   Neurological: Alert and oriented to person, place, and time. Normal reflexes. Normal reflexes. No cranial nerve deficit. Normal muscle tone. Coordination normal.   Skin: Skin is warm and dry. No rash noted. Not diaphoretic. No erythema. No pallor.   Psychiatric: Normal mood and affect. Behavior is normal. Judgment and thought content normal.   Nursing note and vitals " reviewed.      No results found for: HBA1C  Lab Results   Component Value Date/Time    SODIUM 135 09/18/2019 07:46 AM    SODIUM 128 (L) 04/28/2017 11:01 AM    POTASSIUM 4.9 09/18/2019 07:46 AM    POTASSIUM 4.1 04/28/2017 11:01 AM    CHLORIDE 100 09/18/2019 07:46 AM    CHLORIDE 94 (L) 04/28/2017 11:01 AM    CO2 20 09/18/2019 07:46 AM    CO2 26 04/28/2017 11:01 AM    GLUCOSE 86 09/18/2019 07:46 AM    GLUCOSE 83 04/28/2017 11:01 AM    BUN 7 (L) 09/18/2019 07:46 AM    BUN 15 04/28/2017 11:01 AM    CREATININE 0.75 (L) 09/18/2019 07:46 AM    CREATININE 0.63 04/28/2017 11:01 AM    CREATININE 0.88 01/24/2013 09:31 AM    BUNCREATRAT 9 (L) 09/18/2019 07:46 AM    BUNCREATRAT 11 01/24/2013 09:31 AM    ALKPHOSPHAT 68 09/18/2019 07:46 AM    ALKPHOSPHAT 82 04/27/2017 03:30 AM    ASTSGOT 43 (H) 09/18/2019 07:46 AM    ASTSGOT 37 04/27/2017 03:30 AM    ALTSGPT 49 (H) 09/18/2019 07:46 AM    ALTSGPT 48 04/27/2017 03:30 AM    TBILIRUBIN 0.7 09/18/2019 07:46 AM    TBILIRUBIN 1.3 04/27/2017 03:30 AM     Lab Results   Component Value Date/Time    INR 0.86 (L) 04/26/2017 12:35 PM     Lab Results   Component Value Date/Time    CHOLSTRLTOT 187 09/18/2019 07:46 AM    CHOLSTRLTOT 199 01/24/2013 09:31 AM     (H) 09/18/2019 07:46 AM     (H) 01/24/2013 09:31 AM    HDL 61 09/18/2019 07:46 AM    HDL 57 01/24/2013 09:31 AM    TRIGLYCERIDE 50 09/18/2019 07:46 AM    TRIGLYCERIDE 111 01/24/2013 09:31 AM       No results found for: TESTOSTERONE  Lab Results   Component Value Date/Time    TSH 1.970 03/19/2018 09:54 AM    TSH 1.830 01/29/2014 09:34 AM     No results found for: FREET4  No results found for: URICACID  No components found for: VITB12  Lab Results   Component Value Date/Time    25HYDROXY 31.1 07/31/2014 09:32 AM    25HYDROXY 30.6 01/29/2014 09:34 AM          Assessment/Plan:     1. Essential hypertension  - Today I have initiated patient on lisinopril 10 mg QN. I reviewed potential risks, benefits, and side effects of this  medication with the patient in clinic today.  - Recommend to monitor blood pressure and heart rate at home.  - lisinopril (PRINIVIL) 10 MG Tab; Take 1 Tab by mouth every day.  Dispense: 90 Tab; Refill: 4    2. Mixed hyperlipidemia  - Under good control. Continue same regimen of lovastatin.  diet/exercise/lose 15 lbs.; patient counseled, and avoid alcohol and salt.    3. Primary insomnia  - Under good control. Continue same regimen of trazodone 50 mg QN PRN and avoid ambien use. Use two if needed.    4. Vitamin B 12 deficiency  - Under good control. Continue same regimen of cyanocobalamin 1000 mcg QD.    5. Anxiety  Under good control. Continue same regimen. Continue to monitor. Patient denies depression, SI, self harm.    6. Need for vaccination  - Patient was agreeable to receiving the PPSV-23 vaccine in clinic today after discussion of potential risks, benefits, and side effects. Vaccine was administered without adverse effects.  - Pneumoccocal Polysaccharide Vaccine 23-Valent =>3yo SQ/IM     30 minute face-to-face encounter took place today.  More than half of this time was spent in the coordination of care of the above problems, as well as counseling.     Oly BUENO (Karis), am scribing for, and in the presence of, Javon Palacios M.D..    Electronically signed by: Oly Gibson (Karis), 10/31/2019    Javon BUENO M.D., personally performed the services described in this documentation, as scribed by Oly Gibson in my presence, and it is both accurate and complete.

## 2020-02-20 ENCOUNTER — OFFICE VISIT (OUTPATIENT)
Dept: MEDICAL GROUP | Age: 70
End: 2020-02-20
Payer: COMMERCIAL

## 2020-02-20 VITALS
WEIGHT: 168 LBS | HEART RATE: 89 BPM | SYSTOLIC BLOOD PRESSURE: 160 MMHG | TEMPERATURE: 97.4 F | HEIGHT: 70 IN | OXYGEN SATURATION: 94 % | DIASTOLIC BLOOD PRESSURE: 100 MMHG | BODY MASS INDEX: 24.05 KG/M2 | RESPIRATION RATE: 16 BRPM

## 2020-02-20 DIAGNOSIS — E78.2 MIXED HYPERLIPIDEMIA: ICD-10-CM

## 2020-02-20 DIAGNOSIS — F51.01 PRIMARY INSOMNIA: ICD-10-CM

## 2020-02-20 DIAGNOSIS — F41.9 ANXIETY: ICD-10-CM

## 2020-02-20 DIAGNOSIS — I10 ESSENTIAL HYPERTENSION: ICD-10-CM

## 2020-02-20 DIAGNOSIS — E53.8 VITAMIN B 12 DEFICIENCY: ICD-10-CM

## 2020-02-20 PROCEDURE — 99214 OFFICE O/P EST MOD 30 MIN: CPT | Performed by: INTERNAL MEDICINE

## 2020-02-20 RX ORDER — LISINOPRIL 20 MG/1
20 TABLET ORAL DAILY
Qty: 90 TAB | Refills: 4 | Status: SHIPPED | OUTPATIENT
Start: 2020-02-20 | End: 2020-10-12 | Stop reason: SDUPTHER

## 2020-02-20 ASSESSMENT — ENCOUNTER SYMPTOMS
EYES NEGATIVE: 1
MUSCULOSKELETAL NEGATIVE: 1
GASTROINTESTINAL NEGATIVE: 1
NEUROLOGICAL NEGATIVE: 1
RESPIRATORY NEGATIVE: 1
PSYCHIATRIC NEGATIVE: 1
CONSTITUTIONAL NEGATIVE: 1
CARDIOVASCULAR NEGATIVE: 1

## 2020-02-20 ASSESSMENT — PATIENT HEALTH QUESTIONNAIRE - PHQ9: CLINICAL INTERPRETATION OF PHQ2 SCORE: 0

## 2020-02-20 NOTE — PROGRESS NOTES
Subjective:      Mimi Servin is a 69 y.o. male who presents with Hypertension        HPI    The patient is here for followup of chronic medical problems listed below. The patient is compliant with medications and having no side effects from them. Denies chest pain, abdominal pain, dyspnea, myalgias, or cough.    Mixed hyperlipidemia    The patient has a chronic history of mixed hyperlipidemia. Currently taking lovastatin 40 mg and Aspirin 81 mg daily. No medication side effects or acute complaints of myalgias, abdominal pain, dizziness, claudication or chest pain. Lipid panel was last completed on 9/18/19 as shown below.     Results for MIMI SERVIN (MRN [id number]) as of 2/20/2020 09:56   Ref. Range 9/18/2019 07:46   Cholesterol,Tot Latest Ref Range: 100 - 199 mg/dL 187   Triglycerides Latest Ref Range: 0 - 149 mg/dL 50   HDL Latest Ref Range: >39 mg/dL 61   LDL Latest Ref Range: 0 - 99 mg/dL 116 (H)   VLDL Cholesterol Calc Latest Ref Range: 5 - 40 mg/dL 10       Essential hypertension- borderline no meds    Patient has a history of chronic hypertension, currently taking lisinopril 10 mg QD. He reportedly monitors his blood pressure occasionally at home. Blood pressure is elevated today at 160/100.  He reports following a low sodium diet and denies any related complaints including chronic cough, chest pain, headaches or dizziness.        Anxiety  Primary insomnia    Patient has history of anxiety, currently taking trazodone 50 mg QD. Mood and insomnia are under good control. He denies any medication side effects, and would like to continue same regimen.     Vitamin B 12 deficiency    Patient has history of Vitamin B12 deficiency, currently taking cyanocobalamin 1000 mcg.     Patient Active Problem List   Diagnosis   • Mixed hyperlipidemia   • Primary insomnia   • Anxiety   • Vitamin B 12 deficiency   • Essential hypertension- borderline no meds       Outpatient Medications Prior to Visit   Medication Sig Dispense Refill  "  • lovastatin (MEVACOR) 40 MG tablet Take 1 Tab by mouth every day. 90 Tab 4   • traZODone (DESYREL) 50 MG Tab TAKE 1 TABLET BY MOUTH EVERY NIGHT AT BEDTIME 90 Tab 4   • acetaminophen (TYLENOL) 325 MG Tab Take 2 Tabs by mouth every 6 hours. 30 Tab 0   • cyanocobalamin (VITAMIN B12) 1000 MCG Tab Take 1 Tab by mouth every day. 100 Tab 4   • lisinopril (PRINIVIL) 10 MG Tab Take 1 Tab by mouth every day. 90 Tab 4     No facility-administered medications prior to visit.         No Known Allergies    Review of Systems   Constitutional: Negative.    HENT: Negative.    Eyes: Negative.    Respiratory: Negative.    Cardiovascular: Negative.    Gastrointestinal: Negative.    Genitourinary: Negative.    Musculoskeletal: Negative.    Skin: Negative.    Neurological: Negative.    Endo/Heme/Allergies: Negative.    Psychiatric/Behavioral: Negative.    All other systems reviewed and are negative.           Objective:     /100 (BP Location: Left arm, Patient Position: Sitting, BP Cuff Size: Adult)   Pulse 89   Temp 36.3 °C (97.4 °F) (Temporal)   Resp 16   Ht 1.778 m (5' 10\")   Wt 76.2 kg (168 lb)   SpO2 94%   BMI 24.11 kg/m²     Physical Exam   Constitutional: Oriented to person, place, and time. Appears well-developed and well-nourished. No distress.   Head: Normocephalic and atraumatic.   Right Ear: External ear normal.   Left Ear: External ear normal.   Nose: Nose normal.   Mouth/Throat: Oropharynx is clear and moist. No oropharyngeal exudate.   Eyes: Pupils are equal, round, and reactive to light. Conjunctivae and EOM are normal. Right eye exhibits no discharge. Left eye exhibits no discharge. No scleral icterus.   Neck: Normal range of motion. Neck supple. No JVD present. No tracheal deviation present. No thyromegaly present.   Cardiovascular: Normal rate, regular rhythm, normal heart sounds and intact distal pulses.  Exam reveals no gallop and no friction rub.    No murmur heard.  Pulmonary/Chest: Effort normal. " No stridor. No respiratory distress. No wheezing or rales. No tenderness.   Abdominal: Soft. Bowel sounds are normal. No distension and no mass. There is no tenderness. There is no rebound and no guarding. No hernia.   Musculoskeletal: Normal range of motion No edema or tenderness.   Lymphadenopathy: No cervical adenopathy.   Neurological: Alert and oriented to person, place, and time. Normal reflexes. Normal reflexes. No cranial nerve deficit. Normal muscle tone. Coordination normal.   Skin: Skin is warm and dry. No rash noted. Not diaphoretic. No erythema. No pallor.   Psychiatric: Normal mood and affect. Behavior is normal. Judgment and thought content normal.   Nursing note and vitals reviewed.      No results found for: HBA1C  Lab Results   Component Value Date/Time    SODIUM 135 09/18/2019 07:46 AM    SODIUM 128 (L) 04/28/2017 11:01 AM    POTASSIUM 4.9 09/18/2019 07:46 AM    POTASSIUM 4.1 04/28/2017 11:01 AM    CHLORIDE 100 09/18/2019 07:46 AM    CHLORIDE 94 (L) 04/28/2017 11:01 AM    CO2 20 09/18/2019 07:46 AM    CO2 26 04/28/2017 11:01 AM    GLUCOSE 86 09/18/2019 07:46 AM    GLUCOSE 83 04/28/2017 11:01 AM    BUN 7 (L) 09/18/2019 07:46 AM    BUN 15 04/28/2017 11:01 AM    CREATININE 0.75 (L) 09/18/2019 07:46 AM    CREATININE 0.63 04/28/2017 11:01 AM    CREATININE 0.88 01/24/2013 09:31 AM    BUNCREATRAT 9 (L) 09/18/2019 07:46 AM    BUNCREATRAT 11 01/24/2013 09:31 AM    ALKPHOSPHAT 68 09/18/2019 07:46 AM    ALKPHOSPHAT 82 04/27/2017 03:30 AM    ASTSGOT 43 (H) 09/18/2019 07:46 AM    ASTSGOT 37 04/27/2017 03:30 AM    ALTSGPT 49 (H) 09/18/2019 07:46 AM    ALTSGPT 48 04/27/2017 03:30 AM    TBILIRUBIN 0.7 09/18/2019 07:46 AM    TBILIRUBIN 1.3 04/27/2017 03:30 AM     Lab Results   Component Value Date/Time    INR 0.86 (L) 04/26/2017 12:35 PM     Lab Results   Component Value Date/Time    CHOLSTRLTOT 187 09/18/2019 07:46 AM    CHOLSTRLTOT 199 01/24/2013 09:31 AM     (H) 09/18/2019 07:46 AM     (H)  01/24/2013 09:31 AM    HDL 61 09/18/2019 07:46 AM    HDL 57 01/24/2013 09:31 AM    TRIGLYCERIDE 50 09/18/2019 07:46 AM    TRIGLYCERIDE 111 01/24/2013 09:31 AM       No results found for: TESTOSTERONE  Lab Results   Component Value Date/Time    TSH 1.970 03/19/2018 09:54 AM    TSH 1.830 01/29/2014 09:34 AM     No results found for: FREET4  No results found for: URICACID  No components found for: VITB12  Lab Results   Component Value Date/Time    25HYDROXY 31.1 07/31/2014 09:32 AM    25HYDROXY 30.6 01/29/2014 09:34 AM          Assessment/Plan:       1. Mixed hyperlipidemia    Under good control. Continue same regimen of lovastatin 40 mg QD.    - Comp Metabolic Panel; Future  - Lipid Profile; Future  - CBC WITH DIFFERENTIAL; Future    2. Essential hypertension- borderline no meds    Patient's blood pressure is elevated today at 160/100. He will be increased to 20 mg of lisinopril. He is advised to avoid adding sodium to foods.    - lisinopril (PRINIVIL) 20 MG Tab; Take 1 Tab by mouth every day.  Dispense: 90 Tab; Refill: 4    3. Anxiety  4. Primary insomnia    Under good control. Continue same regimen of trazodone 40 mg QD.    5. Vitamin B 12 deficiency    Under good control. Continue same regimen of cyanocobalamin 1000 mcg.         Stephanie BUENO (Karis), am scribing for, and in the presence of, Javon Palacios M.D..    Electronically signed by: Stephanie Smalls (Karis), 2/20/2020    Javon BUENO M.D., personally performed the services described in this documentation, as scribed by Stephanie Smalls in my presence, and it is both accurate and complete.

## 2020-07-09 LAB
ALBUMIN SERPL-MCNC: 4.7 G/DL (ref 3.8–4.8)
ALBUMIN/GLOB SERPL: 1.6 {RATIO} (ref 1.2–2.2)
ALP SERPL-CCNC: 78 IU/L (ref 39–117)
ALT SERPL-CCNC: 138 IU/L (ref 0–44)
AST SERPL-CCNC: 187 IU/L (ref 0–40)
BASOPHILS # BLD AUTO: 0.1 X10E3/UL (ref 0–0.2)
BASOPHILS NFR BLD AUTO: 1 %
BILIRUB SERPL-MCNC: 1.4 MG/DL (ref 0–1.2)
BUN SERPL-MCNC: 9 MG/DL (ref 8–27)
BUN/CREAT SERPL: 13 (ref 10–24)
CALCIUM SERPL-MCNC: 9.8 MG/DL (ref 8.6–10.2)
CHLORIDE SERPL-SCNC: 93 MMOL/L (ref 96–106)
CHOLEST SERPL-MCNC: 185 MG/DL (ref 100–199)
CO2 SERPL-SCNC: 18 MMOL/L (ref 20–29)
CREAT SERPL-MCNC: 0.72 MG/DL (ref 0.76–1.27)
EOSINOPHIL # BLD AUTO: 0.1 X10E3/UL (ref 0–0.4)
EOSINOPHIL NFR BLD AUTO: 2 %
ERYTHROCYTE [DISTWIDTH] IN BLOOD BY AUTOMATED COUNT: 13.5 % (ref 11.6–15.4)
GLOBULIN SER CALC-MCNC: 2.9 G/DL (ref 1.5–4.5)
GLUCOSE SERPL-MCNC: 92 MG/DL (ref 65–99)
HCT VFR BLD AUTO: 40.9 % (ref 37.5–51)
HDLC SERPL-MCNC: 88 MG/DL
HGB BLD-MCNC: 14.9 G/DL (ref 13–17.7)
IMM GRANULOCYTES # BLD AUTO: 0 X10E3/UL (ref 0–0.1)
IMM GRANULOCYTES NFR BLD AUTO: 1 %
IMMATURE CELLS  115398: ABNORMAL
LABORATORY COMMENT REPORT: NORMAL
LDLC SERPL CALC-MCNC: 81 MG/DL (ref 0–99)
LYMPHOCYTES # BLD AUTO: 1.7 X10E3/UL (ref 0.7–3.1)
LYMPHOCYTES NFR BLD AUTO: 34 %
MCH RBC QN AUTO: 35.1 PG (ref 26.6–33)
MCHC RBC AUTO-ENTMCNC: 36.4 G/DL (ref 31.5–35.7)
MCV RBC AUTO: 97 FL (ref 79–97)
MONOCYTES # BLD AUTO: 0.8 X10E3/UL (ref 0.1–0.9)
MONOCYTES NFR BLD AUTO: 16 %
MORPHOLOGY BLD-IMP: ABNORMAL
NEUTROPHILS # BLD AUTO: 2.3 X10E3/UL (ref 1.4–7)
NEUTROPHILS NFR BLD AUTO: 46 %
NRBC BLD AUTO-RTO: ABNORMAL %
PLATELET # BLD AUTO: 426 X10E3/UL (ref 150–450)
POTASSIUM SERPL-SCNC: 4.5 MMOL/L (ref 3.5–5.2)
PROT SERPL-MCNC: 7.6 G/DL (ref 6–8.5)
RBC # BLD AUTO: 4.24 X10E6/UL (ref 4.14–5.8)
SODIUM SERPL-SCNC: 133 MMOL/L (ref 134–144)
TRIGL SERPL-MCNC: 79 MG/DL (ref 0–149)
VLDLC SERPL CALC-MCNC: 16 MG/DL (ref 5–40)
WBC # BLD AUTO: 4.9 X10E3/UL (ref 3.4–10.8)

## 2020-07-14 ENCOUNTER — OFFICE VISIT (OUTPATIENT)
Dept: MEDICAL GROUP | Age: 70
End: 2020-07-14
Payer: COMMERCIAL

## 2020-07-14 VITALS
OXYGEN SATURATION: 96 % | HEIGHT: 70 IN | BODY MASS INDEX: 23.22 KG/M2 | SYSTOLIC BLOOD PRESSURE: 140 MMHG | HEART RATE: 102 BPM | DIASTOLIC BLOOD PRESSURE: 70 MMHG | TEMPERATURE: 98.3 F | WEIGHT: 162.2 LBS

## 2020-07-14 DIAGNOSIS — J32.9 CHRONIC SINUSITIS WITH RECURRENT BRONCHITIS: ICD-10-CM

## 2020-07-14 DIAGNOSIS — I10 ESSENTIAL HYPERTENSION: ICD-10-CM

## 2020-07-14 DIAGNOSIS — E78.2 MIXED HYPERLIPIDEMIA: ICD-10-CM

## 2020-07-14 DIAGNOSIS — F51.01 PRIMARY INSOMNIA: ICD-10-CM

## 2020-07-14 DIAGNOSIS — R74.8 ELEVATED LIVER ENZYMES: ICD-10-CM

## 2020-07-14 DIAGNOSIS — J40 CHRONIC SINUSITIS WITH RECURRENT BRONCHITIS: ICD-10-CM

## 2020-07-14 PROCEDURE — 99214 OFFICE O/P EST MOD 30 MIN: CPT | Performed by: INTERNAL MEDICINE

## 2020-07-14 RX ORDER — CEPHALEXIN 500 MG/1
CAPSULE ORAL
Qty: 40 CAP | Refills: 0 | Status: SHIPPED | OUTPATIENT
Start: 2020-07-14 | End: 2020-10-12

## 2020-07-14 ASSESSMENT — FIBROSIS 4 INDEX: FIB4 SCORE: 2.54

## 2020-07-14 ASSESSMENT — ENCOUNTER SYMPTOMS
PSYCHIATRIC NEGATIVE: 1
CARDIOVASCULAR NEGATIVE: 1
RESPIRATORY NEGATIVE: 1
CONSTITUTIONAL NEGATIVE: 1
NEUROLOGICAL NEGATIVE: 1
MUSCULOSKELETAL NEGATIVE: 1
GASTROINTESTINAL NEGATIVE: 1
EYES NEGATIVE: 1

## 2020-07-14 NOTE — PROGRESS NOTES
Subjective:      Mauricio Obando is a 70 y.o. male who presents with Follow-Up and Lab Results  The patient is here for followup of chronic medical problems listed below. The patient is compliant with medications and having no side effects from them. Denies chest pain, abdominal pain, dyspnea, myalgias, or cough.   Patient Active Problem List    Diagnosis Date Noted   • Essential hypertension- borderline no meds 03/22/2018   • Vitamin B 12 deficiency 08/12/2016   • Anxiety 08/02/2013   • Mixed hyperlipidemia 05/01/2012   • Primary insomnia 05/01/2012     Patient has no known allergies.  Outpatient Medications Prior to Visit   Medication Sig Dispense Refill   • lisinopril (PRINIVIL) 20 MG Tab Take 1 Tab by mouth every day. 90 Tab 4   • lovastatin (MEVACOR) 40 MG tablet Take 1 Tab by mouth every day. 90 Tab 4   • traZODone (DESYREL) 50 MG Tab TAKE 1 TABLET BY MOUTH EVERY NIGHT AT BEDTIME 90 Tab 4   • cyanocobalamin (VITAMIN B12) 1000 MCG Tab Take 1 Tab by mouth every day. 100 Tab 4   • acetaminophen (TYLENOL) 325 MG Tab Take 2 Tabs by mouth every 6 hours. 30 Tab 0     No facility-administered medications prior to visit.      No results found for: HBA1C  Lab Results   Component Value Date/Time    SODIUM 133 (L) 07/08/2020 06:36 AM    SODIUM 128 (L) 04/28/2017 11:01 AM    POTASSIUM 4.5 07/08/2020 06:36 AM    POTASSIUM 4.1 04/28/2017 11:01 AM    CHLORIDE 93 (L) 07/08/2020 06:36 AM    CHLORIDE 94 (L) 04/28/2017 11:01 AM    CO2 18 (L) 07/08/2020 06:36 AM    CO2 26 04/28/2017 11:01 AM    GLUCOSE 92 07/08/2020 06:36 AM    GLUCOSE 83 04/28/2017 11:01 AM    BUN 9 07/08/2020 06:36 AM    BUN 15 04/28/2017 11:01 AM    CREATININE 0.72 (L) 07/08/2020 06:36 AM    CREATININE 0.63 04/28/2017 11:01 AM    CREATININE 0.88 01/24/2013 09:31 AM    BUNCREATRAT 13 07/08/2020 06:36 AM    BUNCREATRAT 11 01/24/2013 09:31 AM    ALKPHOSPHAT 78 07/08/2020 06:36 AM    ALKPHOSPHAT 82 04/27/2017 03:30 AM    ASTSGOT 187 (H) 07/08/2020 06:36 AM    ASTSGOT 37  04/27/2017 03:30 AM    ALTSGPT 138 (H) 07/08/2020 06:36 AM    ALTSGPT 48 04/27/2017 03:30 AM    TBILIRUBIN 1.4 (H) 07/08/2020 06:36 AM    TBILIRUBIN 1.3 04/27/2017 03:30 AM     Lab Results   Component Value Date/Time    INR 0.86 (L) 04/26/2017 12:35 PM     Lab Results   Component Value Date/Time    CHOLSTRLTOT 185 07/08/2020 06:36 AM    CHOLSTRLTOT 199 01/24/2013 09:31 AM    LDL 81 07/08/2020 06:36 AM     (H) 01/24/2013 09:31 AM    HDL 88 07/08/2020 06:36 AM    HDL 57 01/24/2013 09:31 AM    TRIGLYCERIDE 79 07/08/2020 06:36 AM    TRIGLYCERIDE 111 01/24/2013 09:31 AM       No results found for: TESTOSTERONE  Lab Results   Component Value Date/Time    TSH 1.970 03/19/2018 09:54 AM    TSH 1.830 01/29/2014 09:34 AM     No results found for: FREET4  No results found for: URICACID  No components found for: VITB12  Lab Results   Component Value Date/Time    25HYDROXY 31.1 07/31/2014 09:32 AM    25HYDROXY 30.6 01/29/2014 09:34 AM     No visits with results within 1 Month(s) from this visit.   Latest known visit with results is:   Orders Only on 09/18/2019   Component Date Value   • WBC 09/18/2019 7.2    • RBC 09/18/2019 4.67    • Hemoglobin 09/18/2019 16.1    • Hematocrit 09/18/2019 47.0    • MCV 09/18/2019 101*   • MCH 09/18/2019 34.5*   • MCHC 09/18/2019 34.3    • RDW 09/18/2019 14.0    • Platelet Count 09/18/2019 410    • Neutrophils-Polys 09/18/2019 50    • Lymphocytes 09/18/2019 36    • Monocytes 09/18/2019 11    • Eosinophils 09/18/2019 1    • Basophils 09/18/2019 1    • Immature Cells 09/18/2019 CANCELED    • Neutrophils (Absolute) 09/18/2019 3.6    • Lymphs (Absolute) 09/18/2019 2.6    • Monos (Absolute) 09/18/2019 0.8    • Eos (Absolute) 09/18/2019 0.1    • Baso (Absolute) 09/18/2019 0.0    • Immature Granulocytes 09/18/2019 1    • Immature Granulocytes (a* 09/18/2019 0.0    • Nucleated RBC 09/18/2019 CANCELED    • Comments-Diff 09/18/2019 CANCELED    • Glucose 09/18/2019 86    • Bun 09/18/2019 7*   •  Creatinine 09/18/2019 0.75*   • GFR If Non  Ameri* 09/18/2019 95    • GFR If  09/18/2019 110    • Bun-Creatinine Ratio 09/18/2019 9*   • Sodium 09/18/2019 135    • Potassium 09/18/2019 4.9    • Chloride 09/18/2019 100    • Co2 09/18/2019 20    • Calcium 09/18/2019 9.6    • Total Protein 09/18/2019 7.4    • Albumin 09/18/2019 4.3    • Globulin 09/18/2019 3.1    • A-G Ratio 09/18/2019 1.4    • Total Bilirubin 09/18/2019 0.7    • Alkaline Phosphatase 09/18/2019 68    • AST(SGOT) 09/18/2019 43*   • ALT(SGPT) 09/18/2019 49*   • Cholesterol,Tot 09/18/2019 187    • Triglycerides 09/18/2019 50    • HDL 09/18/2019 61    • VLDL Cholesterol Calc 09/18/2019 10    • LDL 09/18/2019 116*   • Comment: 09/18/2019 CANCELED    • WBC 07/08/2020 4.9    • RBC 07/08/2020 4.24    • Hemoglobin 07/08/2020 14.9    • Hematocrit 07/08/2020 40.9    • MCV 07/08/2020 97    • MCH 07/08/2020 35.1*   • MCHC 07/08/2020 36.4*   • RDW 07/08/2020 13.5    • Platelet Count 07/08/2020 426    • Neutrophils-Polys 07/08/2020 46    • Lymphocytes 07/08/2020 34    • Monocytes 07/08/2020 16    • Eosinophils 07/08/2020 2    • Basophils 07/08/2020 1    • Immature Cells 07/08/2020 CANCELED    • Neutrophils (Absolute) 07/08/2020 2.3    • Lymphs (Absolute) 07/08/2020 1.7    • Monos (Absolute) 07/08/2020 0.8    • Eos (Absolute) 07/08/2020 0.1    • Baso (Absolute) 07/08/2020 0.1    • Immature Granulocytes 07/08/2020 1    • Immature Granulocytes (a* 07/08/2020 0.0    • Nucleated RBC 07/08/2020 CANCELED    • Comments-Diff 07/08/2020 CANCELED    • Glucose 07/08/2020 92    • Bun 07/08/2020 9    • Creatinine 07/08/2020 0.72*   • GFR If Non  Ameri* 07/08/2020 96    • GFR If  07/08/2020 111    • Bun-Creatinine Ratio 07/08/2020 13    • Sodium 07/08/2020 133*   • Potassium 07/08/2020 4.5    • Chloride 07/08/2020 93*   • Co2 07/08/2020 18*   • Calcium 07/08/2020 9.8    • Total Protein 07/08/2020 7.6    • Albumin 07/08/2020 4.7    •  "Globulin 07/08/2020 2.9    • A-G Ratio 07/08/2020 1.6    • Total Bilirubin 07/08/2020 1.4*   • Alkaline Phosphatase 07/08/2020 78    • AST(SGOT) 07/08/2020 187*   • ALT(SGPT) 07/08/2020 138*   • Cholesterol,Tot 07/08/2020 185    • Triglycerides 07/08/2020 79    • HDL 07/08/2020 88    • VLDL Cholesterol Calc 07/08/2020 16    • LDL 07/08/2020 81    • Comment: 07/08/2020 CANCELED                 HPI    Review of Systems   Constitutional: Negative.    HENT: Negative.    Eyes: Negative.    Respiratory: Negative.    Cardiovascular: Negative.    Gastrointestinal: Negative.    Genitourinary: Negative.    Musculoskeletal: Negative.    Skin: Negative.    Neurological: Negative.    Endo/Heme/Allergies: Negative.    Psychiatric/Behavioral: Negative.           Objective:     /70 (BP Location: Right arm, Patient Position: Sitting, BP Cuff Size: Adult)   Pulse (!) 102   Temp 36.8 °C (98.3 °F) (Temporal)   Ht 1.778 m (5' 10\")   Wt 73.6 kg (162 lb 3.2 oz)   SpO2 96%   BMI 23.27 kg/m²      Physical Exam  Constitutional:       General: He is not in acute distress.     Appearance: He is well-developed. He is not diaphoretic.   HENT:      Head: Normocephalic and atraumatic.      Right Ear: External ear normal.      Left Ear: External ear normal.      Nose: Nose normal.      Mouth/Throat:      Pharynx: No oropharyngeal exudate.   Eyes:      General: No scleral icterus.        Right eye: No discharge.         Left eye: No discharge.      Conjunctiva/sclera: Conjunctivae normal.      Pupils: Pupils are equal, round, and reactive to light.   Neck:      Musculoskeletal: Normal range of motion and neck supple.      Thyroid: No thyromegaly.      Vascular: No JVD.      Trachea: No tracheal deviation.   Cardiovascular:      Rate and Rhythm: Normal rate and regular rhythm.      Heart sounds: Normal heart sounds. No murmur. No friction rub. No gallop.    Pulmonary:      Effort: Pulmonary effort is normal. No respiratory distress.      " Breath sounds: Normal breath sounds. No stridor. No wheezing or rales.   Chest:      Chest wall: No tenderness.   Abdominal:      General: Bowel sounds are normal. There is no distension.      Palpations: Abdomen is soft. There is no mass.      Tenderness: There is no abdominal tenderness. There is no guarding or rebound.   Musculoskeletal: Normal range of motion.         General: No tenderness.   Lymphadenopathy:      Cervical: No cervical adenopathy.   Skin:     General: Skin is warm and dry.      Coloration: Skin is not pale.      Findings: No erythema or rash.   Neurological:      Mental Status: He is alert and oriented to person, place, and time.      Motor: No abnormal muscle tone.      Coordination: Coordination normal.      Deep Tendon Reflexes: Reflexes are normal and symmetric. Reflexes normal.   Psychiatric:         Behavior: Behavior normal.         Thought Content: Thought content normal.         Judgment: Judgment normal.        Results for MIMI SERVIN (MRN [id number]) as of 7/14/2020 12:47   Ref. Range 7/8/2020 06:36   AST(SGOT) Latest Ref Range: 0 - 40 IU/L 187 (H)   ALT(SGPT) Latest Ref Range: 0 - 44 IU/L 138 (H)   Alkaline Phosphatase Latest Ref Range: 39 - 117 IU/L 78   Total Bilirubin Latest Ref Range: 0.0 - 1.2 mg/dL 1.4 (H)             Assessment/Plan:       1. Elevated liver enzymes-probably similar to excess alcohol consumption.  His transaminases have increased 3 times upper limit of normal with a doubling of his bilirubin which however is a still normal.   normal alk phos. patient admits to excess alcohol consumption.  Advised getting abdominal ultrasound and/or scan of the abdomen to further evaluate.  Patient wants to repeat the labs in 3 months.  While you are not agree with this approach it still probably acceptable give no significant physical findings such as hepatosplenomegaly masses or tenderness.  And he has no symptoms suggestive serious gastrointestinal disorder.  We will see him in  3 months and if enzymes are still elevated proceed with CT scan of the abdomen.  If he change his mind or develops any GI symptoms in the meantime tolerating well and we will order it earlier.     - Comp Metabolic Panel; Future  - CBC WITH DIFFERENTIAL; Future    2. Mixed hyperlipidemia  Good control continue same regimen    3. Primary insomnia  Good control continue trazodone.    4. Essential hypertension-on lisinopril.  Borderline control.     This should improve with alcohol cessation.

## 2020-10-08 LAB
ALBUMIN SERPL-MCNC: 4.4 G/DL (ref 3.8–4.8)
ALBUMIN/GLOB SERPL: 1.5 {RATIO} (ref 1.2–2.2)
ALP SERPL-CCNC: 64 IU/L (ref 39–117)
ALT SERPL-CCNC: 18 IU/L (ref 0–44)
AST SERPL-CCNC: 22 IU/L (ref 0–40)
BASOPHILS # BLD AUTO: 0.1 X10E3/UL (ref 0–0.2)
BASOPHILS NFR BLD AUTO: 1 %
BILIRUB SERPL-MCNC: 0.5 MG/DL (ref 0–1.2)
BUN SERPL-MCNC: 10 MG/DL (ref 8–27)
BUN/CREAT SERPL: 12 (ref 10–24)
CALCIUM SERPL-MCNC: 9.9 MG/DL (ref 8.6–10.2)
CHLORIDE SERPL-SCNC: 98 MMOL/L (ref 96–106)
CO2 SERPL-SCNC: 20 MMOL/L (ref 20–29)
CREAT SERPL-MCNC: 0.81 MG/DL (ref 0.76–1.27)
EOSINOPHIL # BLD AUTO: 0.4 X10E3/UL (ref 0–0.4)
EOSINOPHIL NFR BLD AUTO: 7 %
ERYTHROCYTE [DISTWIDTH] IN BLOOD BY AUTOMATED COUNT: 12.8 % (ref 11.6–15.4)
GLOBULIN SER CALC-MCNC: 3 G/DL (ref 1.5–4.5)
GLUCOSE SERPL-MCNC: 89 MG/DL (ref 65–99)
HCT VFR BLD AUTO: 42.4 % (ref 37.5–51)
HGB BLD-MCNC: 14.8 G/DL (ref 13–17.7)
IMM GRANULOCYTES # BLD AUTO: 0 X10E3/UL (ref 0–0.1)
IMM GRANULOCYTES NFR BLD AUTO: 0 %
IMMATURE CELLS  115398: ABNORMAL
LYMPHOCYTES # BLD AUTO: 2.5 X10E3/UL (ref 0.7–3.1)
LYMPHOCYTES NFR BLD AUTO: 43 %
MCH RBC QN AUTO: 33.8 PG (ref 26.6–33)
MCHC RBC AUTO-ENTMCNC: 34.9 G/DL (ref 31.5–35.7)
MCV RBC AUTO: 97 FL (ref 79–97)
MONOCYTES # BLD AUTO: 0.7 X10E3/UL (ref 0.1–0.9)
MONOCYTES NFR BLD AUTO: 13 %
MORPHOLOGY BLD-IMP: ABNORMAL
NEUTROPHILS # BLD AUTO: 2.1 X10E3/UL (ref 1.4–7)
NEUTROPHILS NFR BLD AUTO: 36 %
NRBC BLD AUTO-RTO: ABNORMAL %
PLATELET # BLD AUTO: 452 X10E3/UL (ref 150–450)
POTASSIUM SERPL-SCNC: 4.6 MMOL/L (ref 3.5–5.2)
PROT SERPL-MCNC: 7.4 G/DL (ref 6–8.5)
RBC # BLD AUTO: 4.38 X10E6/UL (ref 4.14–5.8)
SODIUM SERPL-SCNC: 133 MMOL/L (ref 134–144)
WBC # BLD AUTO: 5.8 X10E3/UL (ref 3.4–10.8)

## 2020-10-12 ENCOUNTER — OFFICE VISIT (OUTPATIENT)
Dept: MEDICAL GROUP | Age: 70
End: 2020-10-12
Payer: MEDICARE

## 2020-10-12 VITALS
SYSTOLIC BLOOD PRESSURE: 100 MMHG | HEART RATE: 95 BPM | BODY MASS INDEX: 22.9 KG/M2 | HEIGHT: 70 IN | OXYGEN SATURATION: 96 % | WEIGHT: 160 LBS | DIASTOLIC BLOOD PRESSURE: 66 MMHG | TEMPERATURE: 97.1 F

## 2020-10-12 DIAGNOSIS — I10 ESSENTIAL HYPERTENSION: ICD-10-CM

## 2020-10-12 DIAGNOSIS — F51.01 PRIMARY INSOMNIA: ICD-10-CM

## 2020-10-12 DIAGNOSIS — J30.1 CHRONIC SEASONAL ALLERGIC RHINITIS DUE TO POLLEN: ICD-10-CM

## 2020-10-12 DIAGNOSIS — E78.2 MIXED HYPERLIPIDEMIA: ICD-10-CM

## 2020-10-12 DIAGNOSIS — F41.9 ANXIETY: ICD-10-CM

## 2020-10-12 DIAGNOSIS — E53.8 VITAMIN B 12 DEFICIENCY: ICD-10-CM

## 2020-10-12 PROCEDURE — 99214 OFFICE O/P EST MOD 30 MIN: CPT | Performed by: INTERNAL MEDICINE

## 2020-10-12 RX ORDER — LOVASTATIN 40 MG/1
40 TABLET ORAL DAILY
Qty: 90 TAB | Refills: 4 | Status: SHIPPED
Start: 2020-10-12 | End: 2020-11-10 | Stop reason: SDUPTHER

## 2020-10-12 RX ORDER — LOVASTATIN 40 MG/1
40 TABLET ORAL DAILY
Qty: 90 TAB | Refills: 4 | Status: SHIPPED | OUTPATIENT
Start: 2020-10-12 | End: 2020-10-12 | Stop reason: SDUPTHER

## 2020-10-12 RX ORDER — LISINOPRIL 20 MG/1
20 TABLET ORAL DAILY
Qty: 90 TAB | Refills: 4 | Status: SHIPPED
Start: 2020-10-12 | End: 2020-10-12 | Stop reason: SDUPTHER

## 2020-10-12 RX ORDER — LISINOPRIL 20 MG/1
20 TABLET ORAL DAILY
Qty: 90 TAB | Refills: 4 | Status: SHIPPED | OUTPATIENT
Start: 2020-10-12 | End: 2020-10-12 | Stop reason: SDUPTHER

## 2020-10-12 RX ORDER — TRAZODONE HYDROCHLORIDE 50 MG/1
TABLET ORAL
Qty: 90 TAB | Refills: 4 | Status: SHIPPED | OUTPATIENT
Start: 2020-10-12 | End: 2020-10-12 | Stop reason: SDUPTHER

## 2020-10-12 RX ORDER — LISINOPRIL 20 MG/1
20 TABLET ORAL DAILY
Qty: 90 TAB | Refills: 4 | Status: SHIPPED
Start: 2020-10-12 | End: 2020-11-10 | Stop reason: SDUPTHER

## 2020-10-12 RX ORDER — TRAZODONE HYDROCHLORIDE 50 MG/1
TABLET ORAL
Qty: 90 TAB | Refills: 4 | Status: SHIPPED
Start: 2020-10-12 | End: 2021-11-09 | Stop reason: SDUPTHER

## 2020-10-12 RX ORDER — TRIAMCINOLONE ACETONIDE 40 MG/ML
40 INJECTION, SUSPENSION INTRA-ARTICULAR; INTRAMUSCULAR ONCE
Status: DISCONTINUED | OUTPATIENT
Start: 2020-10-12 | End: 2020-10-12

## 2020-10-12 RX ORDER — METHYLPREDNISOLONE SODIUM SUCCINATE 125 MG/2ML
62.5 INJECTION, POWDER, LYOPHILIZED, FOR SOLUTION INTRAMUSCULAR; INTRAVENOUS ONCE
Status: COMPLETED | OUTPATIENT
Start: 2020-10-12 | End: 2020-10-12

## 2020-10-12 RX ADMIN — METHYLPREDNISOLONE SODIUM SUCCINATE 62.5 MG: 125 INJECTION, POWDER, LYOPHILIZED, FOR SOLUTION INTRAMUSCULAR; INTRAVENOUS at 08:44

## 2020-10-12 ASSESSMENT — FIBROSIS 4 INDEX: FIB4 SCORE: 0.78

## 2020-10-12 ASSESSMENT — ENCOUNTER SYMPTOMS
PSYCHIATRIC NEGATIVE: 1
EYES NEGATIVE: 1
RESPIRATORY NEGATIVE: 1
NEUROLOGICAL NEGATIVE: 1
GASTROINTESTINAL NEGATIVE: 1
MUSCULOSKELETAL NEGATIVE: 1
CONSTITUTIONAL NEGATIVE: 1
CARDIOVASCULAR NEGATIVE: 1

## 2020-10-12 NOTE — PROGRESS NOTES
"Subjective:      Mauricio Obando is a 70 y.o. male who presents with Follow-Up and Lab Results  The patient is here for followup of chronic medical problems listed below. The patient is compliant with medications and having no side effects from them. Denies chest pain, abdominal pain, dyspnea, myalgias, or cough.   Patient Active Problem List    Diagnosis Date Noted   • Essential hypertension  03/22/2018   • Vitamin B 12 deficiency 08/12/2016   • Anxiety 08/02/2013   • Mixed hyperlipidemia 05/01/2012   • Primary insomnia 05/01/2012     No Known Allergies  Outpatient Medications Prior to Visit   Medication Sig Dispense Refill   • cyanocobalamin (VITAMIN B12) 1000 MCG Tab Take 1 Tab by mouth every day. 100 Tab 4   • cephALEXin (KEFLEX) 500 MG Cap TAKE 1 CAPSULE BY MOUTH FOUR TIMES DAILY FOR 10 DAYS (Patient not taking: Reported on 10/12/2020) 40 Cap 0   • lisinopril (PRINIVIL) 20 MG Tab Take 1 Tab by mouth every day. 90 Tab 4   • lovastatin (MEVACOR) 40 MG tablet Take 1 Tab by mouth every day. 90 Tab 4   • traZODone (DESYREL) 50 MG Tab TAKE 1 TABLET BY MOUTH EVERY NIGHT AT BEDTIME 90 Tab 4   • cyanocobalamin (VITAMIN B12) 1000 MCG Tab Take 1 Tab by mouth every day. 100 Tab 4     No facility-administered medications prior to visit.                HPI    Review of Systems   Constitutional: Negative.    HENT: Positive for congestion.    Eyes: Negative.    Respiratory: Negative.    Cardiovascular: Negative.    Gastrointestinal: Negative.    Genitourinary: Negative.    Musculoskeletal: Negative.    Skin: Negative.    Neurological: Negative.    Endo/Heme/Allergies: Negative.    Psychiatric/Behavioral: Negative.           Objective:     /66 (BP Location: Left arm, Patient Position: Sitting, BP Cuff Size: Adult)   Pulse 95   Temp 36.2 °C (97.1 °F) (Temporal)   Ht 1.778 m (5' 10\")   Wt 72.6 kg (160 lb)   SpO2 96%   BMI 22.96 kg/m²      Physical Exam  Constitutional:       General: He is not in acute distress.     " Appearance: He is well-developed. He is not diaphoretic.   HENT:      Head: Normocephalic and atraumatic.      Right Ear: External ear normal.      Left Ear: External ear normal.      Nose: Nose normal.      Comments: Clear nasal discharge bilaterally     Mouth/Throat:      Pharynx: No oropharyngeal exudate.   Eyes:      General: No scleral icterus.        Right eye: No discharge.         Left eye: No discharge.      Conjunctiva/sclera: Conjunctivae normal.      Pupils: Pupils are equal, round, and reactive to light.   Neck:      Musculoskeletal: Normal range of motion and neck supple.      Thyroid: No thyromegaly.      Vascular: No JVD.      Trachea: No tracheal deviation.   Cardiovascular:      Rate and Rhythm: Normal rate and regular rhythm.      Heart sounds: Normal heart sounds. No murmur. No friction rub. No gallop.    Pulmonary:      Effort: Pulmonary effort is normal. No respiratory distress.      Breath sounds: Normal breath sounds. No stridor. No wheezing or rales.   Chest:      Chest wall: No tenderness.   Abdominal:      General: Bowel sounds are normal. There is no distension.      Palpations: Abdomen is soft. There is no mass.      Tenderness: There is no abdominal tenderness. There is no guarding or rebound.   Musculoskeletal: Normal range of motion.         General: No tenderness.   Lymphadenopathy:      Cervical: No cervical adenopathy.   Skin:     General: Skin is warm and dry.      Coloration: Skin is not pale.      Findings: No erythema or rash.   Neurological:      Mental Status: He is alert and oriented to person, place, and time.      Motor: No abnormal muscle tone.      Coordination: Coordination normal.      Deep Tendon Reflexes: Reflexes are normal and symmetric. Reflexes normal.   Psychiatric:         Behavior: Behavior normal.         Thought Content: Thought content normal.         Judgment: Judgment normal.       Orders Only on 10/07/2020   Component Date Value   • WBC 10/07/2020 5.8     • RBC 10/07/2020 4.38    • Hemoglobin 10/07/2020 14.8    • Hematocrit 10/07/2020 42.4    • MCV 10/07/2020 97    • MCH 10/07/2020 33.8*   • MCHC 10/07/2020 34.9    • RDW 10/07/2020 12.8    • Platelet Count 10/07/2020 452*   • Neutrophils-Polys 10/07/2020 36    • Lymphocytes 10/07/2020 43    • Monocytes 10/07/2020 13    • Eosinophils 10/07/2020 7    • Basophils 10/07/2020 1    • Immature Cells 10/07/2020 CANCELED    • Neutrophils (Absolute) 10/07/2020 2.1    • Lymphs (Absolute) 10/07/2020 2.5    • Monos (Absolute) 10/07/2020 0.7    • Eos (Absolute) 10/07/2020 0.4    • Baso (Absolute) 10/07/2020 0.1    • Immature Granulocytes 10/07/2020 0    • Immature Granulocytes (a* 10/07/2020 0.0    • Nucleated RBC 10/07/2020 CANCELED    • Comments-Diff 10/07/2020 CANCELED    • Glucose 10/07/2020 89    • Bun 10/07/2020 10    • Creatinine 10/07/2020 0.81    • GFR If Non  Ameri* 10/07/2020 91    • GFR If  10/07/2020 106    • Bun-Creatinine Ratio 10/07/2020 12    • Sodium 10/07/2020 133*   • Potassium 10/07/2020 4.6    • Chloride 10/07/2020 98    • Co2 10/07/2020 20    • Calcium 10/07/2020 9.9    • Total Protein 10/07/2020 7.4    • Albumin 10/07/2020 4.4    • Globulin 10/07/2020 3.0    • A-G Ratio 10/07/2020 1.5    • Total Bilirubin 10/07/2020 0.5    • Alkaline Phosphatase 10/07/2020 64    • AST(SGOT) 10/07/2020 22    • ALT(SGPT) 10/07/2020 18       No results found for: HBA1C  Lab Results   Component Value Date/Time    SODIUM 133 (L) 10/07/2020 06:05 AM    SODIUM 128 (L) 04/28/2017 11:01 AM    POTASSIUM 4.6 10/07/2020 06:05 AM    POTASSIUM 4.1 04/28/2017 11:01 AM    CHLORIDE 98 10/07/2020 06:05 AM    CHLORIDE 94 (L) 04/28/2017 11:01 AM    CO2 20 10/07/2020 06:05 AM    CO2 26 04/28/2017 11:01 AM    GLUCOSE 89 10/07/2020 06:05 AM    GLUCOSE 83 04/28/2017 11:01 AM    BUN 10 10/07/2020 06:05 AM    BUN 15 04/28/2017 11:01 AM    CREATININE 0.81 10/07/2020 06:05 AM    CREATININE 0.63 04/28/2017 11:01 AM     CREATININE 0.88 01/24/2013 09:31 AM    BUNCREATRAT 12 10/07/2020 06:05 AM    BUNCREATRAT 11 01/24/2013 09:31 AM    ALKPHOSPHAT 64 10/07/2020 06:05 AM    ALKPHOSPHAT 82 04/27/2017 03:30 AM    ASTSGOT 22 10/07/2020 06:05 AM    ASTSGOT 37 04/27/2017 03:30 AM    ALTSGPT 18 10/07/2020 06:05 AM    ALTSGPT 48 04/27/2017 03:30 AM    TBILIRUBIN 0.5 10/07/2020 06:05 AM    TBILIRUBIN 1.3 04/27/2017 03:30 AM     Lab Results   Component Value Date/Time    INR 0.86 (L) 04/26/2017 12:35 PM     Lab Results   Component Value Date/Time    CHOLSTRLTOT 185 07/08/2020 06:36 AM    CHOLSTRLTOT 199 01/24/2013 09:31 AM    LDL 81 07/08/2020 06:36 AM     (H) 01/24/2013 09:31 AM    HDL 88 07/08/2020 06:36 AM    HDL 57 01/24/2013 09:31 AM    TRIGLYCERIDE 79 07/08/2020 06:36 AM    TRIGLYCERIDE 111 01/24/2013 09:31 AM       No results found for: TESTOSTERONE  Lab Results   Component Value Date/Time    TSH 1.970 03/19/2018 09:54 AM    TSH 1.830 01/29/2014 09:34 AM     No results found for: FREET4  No results found for: URICACID  No components found for: VITB12  Lab Results   Component Value Date/Time    25HYDROXY 31.1 07/31/2014 09:32 AM    25HYDROXY 30.6 01/29/2014 09:34 AM               Assessment/Plan:        1. Chronic seasonal allergic rhinitis due to pollen  Not well controlled.  Needs steroid injection which works with him seasonally.  Ordered.  Continue with Flonase 2 puffs on each side once daily.  Claritin as needed.  - methylPREDNISolone sod succ (SOLU-MEDROL) 125 MG injection 62.5 mg    2. Mixed hyperlipidemia     Under good control. Continue same regimen.     - lovastatin (MEVACOR) 40 MG tablet; Take 1 Tab by mouth every day.  Dispense: 90 Tab; Refill: 4  - Comp Metabolic Panel; Future  - Lipid Profile; Future  - CBC WITH DIFFERENTIAL; Future    3. Primary insomnia     Under good control. Continue same regimen.   - traZODone (DESYREL) 50 MG Tab; TAKE 1 TABLET BY MOUTH EVERY NIGHT AT BEDTIME  Dispense: 90 Tab; Refill: 4    4.  Anxiety     Under good control. Continue same regimen.       5. Vitamin B 12 deficiency     Under good control. Continue same regimen.   - cyanocobalamin (VITAMIN B12) 1000 MCG Tab; Take 1 Tab by mouth every day.  Dispense: 100 Tab; Refill: 4    6. Essential hypertension        Under good control. Continue same regimen.    - lisinopril (PRINIVIL) 20 MG Tab; Take 1 Tab by mouth every day.  Dispense: 90 Tab; Refill: 4  - Comp Metabolic Panel; Future  - Lipid Profile; Future  - CBC WITH DIFFERENTIAL; Future

## 2020-11-10 DIAGNOSIS — E78.2 MIXED HYPERLIPIDEMIA: ICD-10-CM

## 2020-11-10 DIAGNOSIS — I10 ESSENTIAL HYPERTENSION: ICD-10-CM

## 2020-11-10 RX ORDER — LOVASTATIN 40 MG/1
40 TABLET ORAL DAILY
Qty: 90 TAB | Refills: 4 | Status: SHIPPED | OUTPATIENT
Start: 2020-11-10 | End: 2021-11-09 | Stop reason: SDUPTHER

## 2020-11-10 RX ORDER — LISINOPRIL 20 MG/1
20 TABLET ORAL DAILY
Qty: 90 TAB | Refills: 4 | Status: SHIPPED | OUTPATIENT
Start: 2020-11-10 | End: 2021-11-09 | Stop reason: SDUPTHER

## 2020-11-10 NOTE — TELEPHONE ENCOUNTER
Received request via: Patient    Was the patient seen in the last year in this department? Yes    Does the patient have an active prescription (recently filled or refills available) for medication(s) requested? would like to have it sent to the mail order pharmacy

## 2021-04-12 ENCOUNTER — NURSE TRIAGE (OUTPATIENT)
Dept: HEALTH INFORMATION MANAGEMENT | Facility: OTHER | Age: 71
End: 2021-04-12

## 2021-04-12 ENCOUNTER — OFFICE VISIT (OUTPATIENT)
Dept: MEDICAL GROUP | Age: 71
End: 2021-04-12
Payer: MEDICARE

## 2021-04-12 ENCOUNTER — TELEPHONE (OUTPATIENT)
Dept: MEDICAL GROUP | Age: 71
End: 2021-04-12

## 2021-04-12 VITALS
HEART RATE: 90 BPM | SYSTOLIC BLOOD PRESSURE: 104 MMHG | DIASTOLIC BLOOD PRESSURE: 62 MMHG | OXYGEN SATURATION: 94 % | WEIGHT: 164 LBS | HEIGHT: 70 IN | TEMPERATURE: 98.9 F | BODY MASS INDEX: 23.48 KG/M2

## 2021-04-12 DIAGNOSIS — F41.9 ANXIETY: ICD-10-CM

## 2021-04-12 DIAGNOSIS — I10 ESSENTIAL HYPERTENSION: ICD-10-CM

## 2021-04-12 DIAGNOSIS — J30.1 SEASONAL ALLERGIC RHINITIS DUE TO POLLEN: ICD-10-CM

## 2021-04-12 DIAGNOSIS — E78.2 MIXED HYPERLIPIDEMIA: ICD-10-CM

## 2021-04-12 DIAGNOSIS — E53.8 VITAMIN B 12 DEFICIENCY: ICD-10-CM

## 2021-04-12 DIAGNOSIS — F51.01 PRIMARY INSOMNIA: ICD-10-CM

## 2021-04-12 PROCEDURE — 99214 OFFICE O/P EST MOD 30 MIN: CPT | Performed by: INTERNAL MEDICINE

## 2021-04-12 RX ORDER — TRIAMCINOLONE ACETONIDE 40 MG/ML
40 INJECTION, SUSPENSION INTRA-ARTICULAR; INTRAMUSCULAR ONCE
Status: COMPLETED | OUTPATIENT
Start: 2021-04-12 | End: 2021-04-12

## 2021-04-12 RX ADMIN — TRIAMCINOLONE ACETONIDE 40 MG: 40 INJECTION, SUSPENSION INTRA-ARTICULAR; INTRAMUSCULAR at 14:37

## 2021-04-12 ASSESSMENT — ENCOUNTER SYMPTOMS
PSYCHIATRIC NEGATIVE: 1
MUSCULOSKELETAL NEGATIVE: 1
GASTROINTESTINAL NEGATIVE: 1
CONSTITUTIONAL NEGATIVE: 1
CARDIOVASCULAR NEGATIVE: 1
EYES NEGATIVE: 1
RESPIRATORY NEGATIVE: 1
NEUROLOGICAL NEGATIVE: 1

## 2021-04-12 ASSESSMENT — FIBROSIS 4 INDEX: FIB4 SCORE: 0.78

## 2021-04-12 ASSESSMENT — PATIENT HEALTH QUESTIONNAIRE - PHQ9: CLINICAL INTERPRETATION OF PHQ2 SCORE: 0

## 2021-04-12 NOTE — PROGRESS NOTES
Subjective:      Mauricio Obando is a 70 y.o. male who presents with Seasonal Allergies (Pt. request kenalog shot )  Patient presents with acute flareup of his seasonal allergic rhinitis with itchy watery eyes runny nose and sneezing and cough.  No fever chills.  No purulent nasal discharge.  Gets this typically every spring and responds to Kenalog injections.  Has not tried nasal steroids such as fluticasone yet.    Other problems including his dyslipidemia, insomnia and anxiety have been well controlled on current regimen.  No new complaints.  He does have a skin rash of his face that he is going to see his dermatologist for as well as follow-up of his multiple nevi.        Outpatient Medications Prior to Visit   Medication Sig Dispense Refill   • lisinopril (PRINIVIL) 20 MG Tab Take 1 Tab by mouth every day. 90 Tab 4   • lovastatin (MEVACOR) 40 MG tablet Take 1 Tab by mouth every day. 90 Tab 4   • cyanocobalamin (VITAMIN B12) 1000 MCG Tab Take 1 Tab by mouth every day. 100 Tab 4   • traZODone (DESYREL) 50 MG Tab TAKE 1 TABLET BY MOUTH EVERY NIGHT AT BEDTIME 90 Tab 4     No facility-administered medications prior to visit.     No Known Allergies  ./lll  No results found for: HBA1C  Lab Results   Component Value Date/Time    SODIUM 133 (L) 10/07/2020 06:05 AM    SODIUM 128 (L) 04/28/2017 11:01 AM    POTASSIUM 4.6 10/07/2020 06:05 AM    POTASSIUM 4.1 04/28/2017 11:01 AM    CHLORIDE 98 10/07/2020 06:05 AM    CHLORIDE 94 (L) 04/28/2017 11:01 AM    CO2 20 10/07/2020 06:05 AM    CO2 26 04/28/2017 11:01 AM    GLUCOSE 89 10/07/2020 06:05 AM    GLUCOSE 83 04/28/2017 11:01 AM    BUN 10 10/07/2020 06:05 AM    BUN 15 04/28/2017 11:01 AM    CREATININE 0.81 10/07/2020 06:05 AM    CREATININE 0.63 04/28/2017 11:01 AM    CREATININE 0.88 01/24/2013 09:31 AM    BUNCREATRAT 12 10/07/2020 06:05 AM    BUNCREATRAT 11 01/24/2013 09:31 AM    ALKPHOSPHAT 64 10/07/2020 06:05 AM    ALKPHOSPHAT 82 04/27/2017 03:30 AM    ASTSGOT 22 10/07/2020 06:05  "AM    ASTSGOT 37 04/27/2017 03:30 AM    ALTSGPT 18 10/07/2020 06:05 AM    ALTSGPT 48 04/27/2017 03:30 AM    TBILIRUBIN 0.5 10/07/2020 06:05 AM    TBILIRUBIN 1.3 04/27/2017 03:30 AM     Lab Results   Component Value Date/Time    INR 0.86 (L) 04/26/2017 12:35 PM     Lab Results   Component Value Date/Time    CHOLSTRLTOT 185 07/08/2020 06:36 AM    CHOLSTRLTOT 199 01/24/2013 09:31 AM    LDL 81 07/08/2020 06:36 AM     (H) 01/24/2013 09:31 AM    HDL 88 07/08/2020 06:36 AM    HDL 57 01/24/2013 09:31 AM    TRIGLYCERIDE 79 07/08/2020 06:36 AM    TRIGLYCERIDE 111 01/24/2013 09:31 AM       No results found for: TESTOSTERONE  Lab Results   Component Value Date/Time    TSH 1.970 03/19/2018 09:54 AM    TSH 1.830 01/29/2014 09:34 AM     No results found for: FREET4  No results found for: URICACID  No components found for: VITB12  Lab Results   Component Value Date/Time    25HYDROXY 31.1 07/31/2014 09:32 AM    25HYDROXY 30.6 01/29/2014 09:34 AM   ]    HPI    Review of Systems   Constitutional: Negative.    HENT: Positive for congestion.    Eyes: Negative.    Respiratory: Negative.    Cardiovascular: Negative.    Gastrointestinal: Negative.    Genitourinary: Negative.    Musculoskeletal: Negative.    Skin: Positive for itching and rash.   Neurological: Negative.  Tingling: .lll.   Endo/Heme/Allergies: Negative.    Psychiatric/Behavioral: Negative.           Objective:     /62 (BP Location: Left arm, Patient Position: Sitting, BP Cuff Size: Adult)   Pulse 90   Temp 37.2 °C (98.9 °F) (Temporal)   Ht 1.778 m (5' 10\")   Wt 74.4 kg (164 lb)   SpO2 94%   BMI 23.53 kg/m²      Physical Exam  Constitutional:       General: He is not in acute distress.     Appearance: He is well-developed. He is not diaphoretic.   HENT:      Head: Normocephalic and atraumatic.      Right Ear: External ear normal.      Left Ear: External ear normal.      Nose: Nose normal.      Mouth/Throat:      Pharynx: No oropharyngeal exudate.   Eyes:    "   General: No scleral icterus.        Right eye: No discharge.         Left eye: No discharge.      Conjunctiva/sclera: Conjunctivae normal.      Pupils: Pupils are equal, round, and reactive to light.   Neck:      Thyroid: No thyromegaly.      Vascular: No JVD.      Trachea: No tracheal deviation.   Cardiovascular:      Rate and Rhythm: Normal rate and regular rhythm.      Heart sounds: Normal heart sounds. No murmur. No friction rub. No gallop.    Pulmonary:      Effort: Pulmonary effort is normal. No respiratory distress.      Breath sounds: Normal breath sounds. No stridor. No wheezing or rales.   Chest:      Chest wall: No tenderness.   Abdominal:      General: Bowel sounds are normal. There is no distension.      Palpations: Abdomen is soft. There is no mass.      Tenderness: There is no abdominal tenderness. There is no guarding or rebound.   Musculoskeletal:         General: No tenderness. Normal range of motion.      Cervical back: Normal range of motion and neck supple.   Lymphadenopathy:      Cervical: No cervical adenopathy.   Skin:     General: Skin is warm and dry.      Coloration: Skin is not pale.      Findings: No erythema or rash.   Neurological:      Mental Status: He is alert and oriented to person, place, and time.      Motor: No abnormal muscle tone.      Coordination: Coordination normal.      Deep Tendon Reflexes: Reflexes are normal and symmetric. Reflexes normal.   Psychiatric:         Behavior: Behavior normal.         Thought Content: Thought content normal.         Judgment: Judgment normal.                 Assessment/Plan:        1. Seasonal allergic rhinitis due to pollen-acute flareup nasal steroids recommended over-the-counter fluticasone as well shot of Kenalog today.     - triamcinolone acetonide (KENALOG-40) injection 40 mg    2. Mixed hyperlipidemia   Under good control. Continue same regimen.]  - Comp Metabolic Panel; Future  - Lipid Profile; Future  - CBC WITH DIFFERENTIAL;  Future    3. Primary insomnia   Under good control. Continue same regimen.]\      4. Anxiety     Under good control. Continue same regimen.    5. Vitamin B 12 deficiency   Under good control. Continue same regimen.       6. Essential hypertension        Under good control. Continue same regimen.'

## 2021-04-12 NOTE — TELEPHONE ENCOUNTER
Regarding: Allergies, congested, cough   ----- Message from Lina Gross sent at 4/12/2021  8:27 AM PDT -----  Patient having allergies, congested and occasional cough.

## 2021-04-12 NOTE — TELEPHONE ENCOUNTER
ESTABLISHED PATIENT PRE-VISIT PLANNING   Monday, 04/12/2021:    Patient was NOT contacted to complete PVP.     Note: Patient will not be contacted if there is no indication to call.     1.  Reviewed notes from the last few office visits within the medical group: Yes    2.  If any orders were placed at last visit or intended to be done for this visit (i.e. 6 mos follow-up), do we have Results/Consult Notes?         •  Labs - Labs ordered and not completed  Note: If patient appointment is for lab review and patient did not complete labs, check with provider if OK to reschedule patient until labs completed.       •  Imaging - Imaging was not ordered at last office visit.       •  Referrals - No referrals were ordered at last office visit.    3. Is this appointment scheduled as a Hospital Follow-Up? No    4.  Immunizations were updated in Epic using Reconcile Outside Information activity? Yes    5.  Patient is due for the following Health Maintenance Topics:   Health Maintenance Due   Topic Date Due   • Annual Wellness Visit  Never done   • COVID-19 Vaccine (1) Never done   • IMM ZOSTER VACCINES (2 of 3) 11/24/2017       6.  AHA (Pulse8) form printed for Provider? N/A

## 2021-04-12 NOTE — TELEPHONE ENCOUNTER
Pt calling to schedule appt for allergy shot. Has severe congestion and mild cough due to post nasal drip. Pt suffers from seasonal allergies, denies F/V/D. Per pt, sx feel like typical season allergies.     PT CLEARED FOR IN OFFICE VISIT.    1. Caller Name: Mauricio Obando  2.                  Call Back Number: [phone] (home) [phone] (work)  3.   [facility] PCP or Specialty Provider: Yes Javon Palacios M.D.          2. Has the patient previously tested positive for COVID-19? No    3.  In the last two weeks, has the patient had any new or worsening symptoms (not explained by alternative diagnosis)? Yes, the patient reports the following COVID-19 consistent symptoms: congestion or runny nose.    4.  Does patient have any comoribidities? None     5.  Has the patient had any known contact with someone who is suspected or confirmed to have COVID-19? No.    5. Disposition: Cleared by RN Triage as potential is low for COVID-19; OK to keep/schedule appointment    Note routed to [facility] Provider: [facility] only.     Reason for Disposition  • COVID-19, questions about    Additional Information  • Negative: SEVERE difficulty breathing (e.g., struggling for each breath, speaks in single words)  • Negative: Difficult to awaken or acting confused (e.g., disoriented, slurred speech)  • Negative: Bluish (or gray) lips or face now  • Negative: Shock suspected (e.g., cold/pale/clammy skin, too weak to stand, low BP, rapid pulse)  • Negative: Sounds like a life-threatening emergency to the triager  • Negative: [1] COVID-19 suspected (e.g., cough, fever, shortness of breath) AND [2] public health department recommends testing  • Negative: [1] COVID-19 exposure AND [2] no symptoms  • Negative: COVID-19 and Breastfeeding, questions about  • Negative: SEVERE or constant chest pain (Exception: mild central chest pain, present only when coughing)  • Negative: MODERATE difficulty breathing (e.g., speaks in phrases, SOB even at rest, pulse  "100-120)  • Negative: MILD difficulty breathing (e.g., minimal/no SOB at rest, SOB with walking, pulse <100)  • Negative: Chest pain  • Negative: Patient sounds very sick or weak to the triager  • Negative: Fever > 103 F (39.4 C)  • Negative: [1] Fever > 101 F (38.3 C) AND [2] age > 60  • Negative: [1] Fever > 100.0 F (37.8 C) AND [2] bedridden (e.g., nursing home patient, CVA, chronic illness, recovering from surgery)  • Negative: HIGH RISK patient (e.g., age > 64 years, diabetes, heart or lung disease, weak immune system)  • Negative: Fever present > 3 days (72 hours)  • Negative: [1] Fever returns after gone for over 24 hours AND [2] symptoms worse or not improved  • Negative: [1] Continuous (nonstop) coughing interferes with work or school AND [2] no improvement using cough treatment per protocol  • Negative: Cough present > 3 weeks  • Negative: [1] COVID-19 infection diagnosed or suspected AND [2] mild symptoms (fever, cough) AND [3] no trouble breathing or other complications    Answer Assessment - Initial Assessment Questions  1. COVID-19 DIAGNOSIS: \"Who made your Coronavirus (COVID-19) diagnosis?\" \"Was it confirmed by a positive lab test?\" If not diagnosed by a HCP, ask \"Are there lots of cases (community spread) where you live?\" (See public health department website, if unsure)    * MAJOR community spread: high number of cases; numbers of cases are increasing; many people hospitalized.    * MINOR community spread: low number of cases; not increasing; few or no people hospitalized      Major, no dx or exposure  2. ONSET: \"When did the COVID-19 symptoms start?\"       Over 1 week  3. WORST SYMPTOM: \"What is your worst symptom?\" (e.g., cough, fever, shortness of breath, muscle aches)      congestion  4. COUGH: \"How bad is the cough?\"        Mild from draingage  5. FEVER: \"Do you have a fever?\" If so, ask: \"What is your temperature, how was it measured, and when did it start?\"      no  6. RESPIRATORY STATUS: " "\"Describe your breathing?\" (e.g., shortness of breath, wheezing, unable to speak)       n/a  7. BETTER-SAME-WORSE: \"Are you getting better, staying the same or getting worse compared to yesterday?\"  If getting worse, ask, \"In what way?\"      worse  8. HIGH RISK DISEASE: \"Do you have any chronic medical problems?\" (e.g., asthma, heart or lung disease, weak immune system, etc.)      no  9. PREGNANCY: \"Is there any chance you are pregnant?\" \"When was your last menstrual period?\"      no  10. OTHER SYMPTOMS: \"Do you have any other symptoms?\"  (e.g., runny nose, headache, sore throat, loss of smell)        no    Protocols used: CORONAVIRUS (COVID-19) DIAGNOSED OR [id number]    "

## 2021-05-13 ENCOUNTER — OFFICE VISIT (OUTPATIENT)
Dept: MEDICAL GROUP | Age: 71
End: 2021-05-13
Payer: MEDICARE

## 2021-05-13 VITALS
BODY MASS INDEX: 23.28 KG/M2 | DIASTOLIC BLOOD PRESSURE: 70 MMHG | OXYGEN SATURATION: 94 % | WEIGHT: 162.6 LBS | HEART RATE: 78 BPM | HEIGHT: 70 IN | SYSTOLIC BLOOD PRESSURE: 108 MMHG | TEMPERATURE: 98.6 F

## 2021-05-13 DIAGNOSIS — F41.9 ANXIETY: ICD-10-CM

## 2021-05-13 DIAGNOSIS — E78.2 MIXED HYPERLIPIDEMIA: ICD-10-CM

## 2021-05-13 DIAGNOSIS — Z23 NEED FOR VACCINATION: ICD-10-CM

## 2021-05-13 DIAGNOSIS — F51.01 PRIMARY INSOMNIA: ICD-10-CM

## 2021-05-13 DIAGNOSIS — I10 ESSENTIAL HYPERTENSION: ICD-10-CM

## 2021-05-13 PROCEDURE — 90750 HZV VACC RECOMBINANT IM: CPT | Performed by: INTERNAL MEDICINE

## 2021-05-13 PROCEDURE — 99214 OFFICE O/P EST MOD 30 MIN: CPT | Mod: 25 | Performed by: INTERNAL MEDICINE

## 2021-05-13 PROCEDURE — 90471 IMMUNIZATION ADMIN: CPT | Performed by: INTERNAL MEDICINE

## 2021-05-13 ASSESSMENT — ENCOUNTER SYMPTOMS
MUSCULOSKELETAL NEGATIVE: 1
RESPIRATORY NEGATIVE: 1
CONSTITUTIONAL NEGATIVE: 1
PSYCHIATRIC NEGATIVE: 1
CARDIOVASCULAR NEGATIVE: 1
EYES NEGATIVE: 1
NEUROLOGICAL NEGATIVE: 1
GASTROINTESTINAL NEGATIVE: 1

## 2021-05-13 ASSESSMENT — FIBROSIS 4 INDEX: FIB4 SCORE: 0.78

## 2021-05-13 NOTE — PROGRESS NOTES
Subjective:      Mauricio Obando is a 70 y.o. male who presents with No chief complaint on file.  The patient is here for followup of chronic medical problems listed below. The patient is compliant with medications and having no side effects from them. Denies chest pain, abdominal pain, dyspnea, myalgias, or cough.   PA Outcome    Patient Active Problem List    Diagnosis Date Noted   • Seasonal allergic rhinitis due to pollen 04/12/2021   • Essential hypertension  03/22/2018   • Vitamin B 12 deficiency 08/12/2016   • Anxiety 08/02/2013   • Mixed hyperlipidemia 05/01/2012   • Primary insomnia 05/01/2012     No Known Allergies  Outpatient Medications Prior to Visit   Medication Sig Dispense Refill   • lisinopril (PRINIVIL) 20 MG Tab Take 1 Tab by mouth every day. 90 Tab 4   • lovastatin (MEVACOR) 40 MG tablet Take 1 Tab by mouth every day. 90 Tab 4   • cyanocobalamin (VITAMIN B12) 1000 MCG Tab Take 1 Tab by mouth every day. 100 Tab 4   • traZODone (DESYREL) 50 MG Tab TAKE 1 TABLET BY MOUTH EVERY NIGHT AT BEDTIME 90 Tab 4     No facility-administered medications prior to visit.     No visits with results within 1 Month(s) from this visit.   Latest known visit with results is:   Orders Only on 10/07/2020   Component Date Value   • WBC 10/07/2020 5.8    • RBC 10/07/2020 4.38    • Hemoglobin 10/07/2020 14.8    • Hematocrit 10/07/2020 42.4    • MCV 10/07/2020 97    • MCH 10/07/2020 33.8*   • MCHC 10/07/2020 34.9    • RDW 10/07/2020 12.8    • Platelet Count 10/07/2020 452*   • Neutrophils-Polys 10/07/2020 36    • Lymphocytes 10/07/2020 43    • Monocytes 10/07/2020 13    • Eosinophils 10/07/2020 7    • Basophils 10/07/2020 1    • Immature Cells 10/07/2020 CANCELED    • Neutrophils (Absolute) 10/07/2020 2.1    • Lymphs (Absolute) 10/07/2020 2.5    • Monos (Absolute) 10/07/2020 0.7    • Eos (Absolute) 10/07/2020 0.4    • Baso (Absolute) 10/07/2020 0.1    • Immature Granulocytes 10/07/2020 0    • Immature Granulocytes (a* 10/07/2020  0.0    • Nucleated RBC 10/07/2020 CANCELED    • Comments-Diff 10/07/2020 CANCELED    • Glucose 10/07/2020 89    • Bun 10/07/2020 10    • Creatinine 10/07/2020 0.81    • GFR If Non  Ameri* 10/07/2020 91    • GFR If  10/07/2020 106    • Bun-Creatinine Ratio 10/07/2020 12    • Sodium 10/07/2020 133*   • Potassium 10/07/2020 4.6    • Chloride 10/07/2020 98    • Co2 10/07/2020 20    • Calcium 10/07/2020 9.9    • Total Protein 10/07/2020 7.4    • Albumin 10/07/2020 4.4    • Globulin 10/07/2020 3.0    • A-G Ratio 10/07/2020 1.5    • Total Bilirubin 10/07/2020 0.5    • Alkaline Phosphatase 10/07/2020 64    • AST(SGOT) 10/07/2020 22    • ALT(SGPT) 10/07/2020 18       ./alll  No results found for: HBA1C  Lab Results   Component Value Date/Time    SODIUM 133 (L) 10/07/2020 06:05 AM    SODIUM 128 (L) 04/28/2017 11:01 AM    POTASSIUM 4.6 10/07/2020 06:05 AM    POTASSIUM 4.1 04/28/2017 11:01 AM    CHLORIDE 98 10/07/2020 06:05 AM    CHLORIDE 94 (L) 04/28/2017 11:01 AM    CO2 20 10/07/2020 06:05 AM    CO2 26 04/28/2017 11:01 AM    GLUCOSE 89 10/07/2020 06:05 AM    GLUCOSE 83 04/28/2017 11:01 AM    BUN 10 10/07/2020 06:05 AM    BUN 15 04/28/2017 11:01 AM    CREATININE 0.81 10/07/2020 06:05 AM    CREATININE 0.63 04/28/2017 11:01 AM    CREATININE 0.88 01/24/2013 09:31 AM    BUNCREATRAT 12 10/07/2020 06:05 AM    BUNCREATRAT 11 01/24/2013 09:31 AM    ALKPHOSPHAT 64 10/07/2020 06:05 AM    ALKPHOSPHAT 82 04/27/2017 03:30 AM    ASTSGOT 22 10/07/2020 06:05 AM    ASTSGOT 37 04/27/2017 03:30 AM    ALTSGPT 18 10/07/2020 06:05 AM    ALTSGPT 48 04/27/2017 03:30 AM    TBILIRUBIN 0.5 10/07/2020 06:05 AM    TBILIRUBIN 1.3 04/27/2017 03:30 AM     Lab Results   Component Value Date/Time    INR 0.86 (L) 04/26/2017 12:35 PM     Lab Results   Component Value Date/Time    CHOLSTRLTOT 185 07/08/2020 06:36 AM    CHOLSTRLTOT 199 01/24/2013 09:31 AM    LDL 81 07/08/2020 06:36 AM     (H) 01/24/2013 09:31 AM    HDL 88 07/08/2020  06:36 AM    HDL 57 01/24/2013 09:31 AM    TRIGLYCERIDE 79 07/08/2020 06:36 AM    TRIGLYCERIDE 111 01/24/2013 09:31 AM       No results found for: TESTOSTERONE  Lab Results   Component Value Date/Time    TSH 1.970 03/19/2018 09:54 AM    TSH 1.830 01/29/2014 09:34 AM     No results found for: FREET4  No results found for: URICACID  No components found for: VITB12  Lab Results   Component Value Date/Time    25HYDROXY 31.1 07/31/2014 09:32 AM    25HYDROXY 30.6 01/29/2014 09:34 AM               HPI    Review of Systems   Constitutional: Negative.    HENT: Negative.    Eyes: Negative.    Respiratory: Negative.    Cardiovascular: Negative.    Gastrointestinal: Negative.    Genitourinary: Negative.    Musculoskeletal: Negative.    Skin: Negative.    Neurological: Negative.    Endo/Heme/Allergies: Negative.    Psychiatric/Behavioral: Negative.           Objective:     There were no vitals taken for this visit.     Physical Exam  Constitutional:       General: He is not in acute distress.     Appearance: He is well-developed. He is not diaphoretic.   HENT:      Head: Normocephalic and atraumatic.      Right Ear: External ear normal.      Left Ear: External ear normal.      Nose: Nose normal.      Mouth/Throat:      Pharynx: No oropharyngeal exudate.   Eyes:      General: No scleral icterus.        Right eye: No discharge.         Left eye: No discharge.      Conjunctiva/sclera: Conjunctivae normal.      Pupils: Pupils are equal, round, and reactive to light.   Neck:      Thyroid: No thyromegaly.      Vascular: No JVD.      Trachea: No tracheal deviation.   Cardiovascular:      Rate and Rhythm: Normal rate and regular rhythm.      Heart sounds: Normal heart sounds. No murmur heard.   No friction rub. No gallop.    Pulmonary:      Effort: Pulmonary effort is normal. No respiratory distress.      Breath sounds: Normal breath sounds. No stridor. No wheezing or rales.   Chest:      Chest wall: No tenderness.   Abdominal:       General: Bowel sounds are normal. There is no distension.      Palpations: Abdomen is soft. There is no mass.      Tenderness: There is no abdominal tenderness. There is no guarding or rebound.   Musculoskeletal:         General: No tenderness. Normal range of motion.      Cervical back: Normal range of motion and neck supple.   Lymphadenopathy:      Cervical: No cervical adenopathy.   Skin:     General: Skin is warm and dry.      Coloration: Skin is not pale.      Findings: No erythema or rash.   Neurological:      Mental Status: He is alert and oriented to person, place, and time.      Motor: No abnormal muscle tone.      Coordination: Coordination normal.      Deep Tendon Reflexes: Reflexes are normal and symmetric. Reflexes normal.   Psychiatric:         Behavior: Behavior normal.         Thought Content: Thought content normal.         Judgment: Judgment normal.       Lab results from lab core done on 4/29/2021 were normal except for slightly elevated LDL of 106 but with normal HDL 59 triglycerides 66 and total cholesterol 178 and a fasting glucose normal at 89.  He had normal renal and liver function tests.  CBC was normal.-Scanned into media.    We will have medical assistant call patient with essentially normal lab results.                        Assessment/Plan:        1. Mixed hyperlipidemia   Under good control. Continue same regimen.      2. Essential hypertension   Under good control. Continue same regimen.        3. Anxiety  Under good control. Continue same regimen.       4. Primary insomnia  Under good control. Continue same regimen.

## 2021-05-18 DIAGNOSIS — J40 CHRONIC SINUSITIS WITH RECURRENT BRONCHITIS: ICD-10-CM

## 2021-05-18 DIAGNOSIS — J32.9 CHRONIC SINUSITIS WITH RECURRENT BRONCHITIS: ICD-10-CM

## 2021-05-18 RX ORDER — CEPHALEXIN 500 MG/1
CAPSULE ORAL
Qty: 40 CAPSULE | Refills: 0 | Status: SHIPPED | OUTPATIENT
Start: 2021-05-18 | End: 2021-11-09 | Stop reason: SDUPTHER

## 2021-05-18 NOTE — TELEPHONE ENCOUNTER
"Received request via: Patient    Was the patient seen in the last year in this department? Yes    Does the patient have an active prescription (recently filled or refills available) for medication(s) requested? No     Pt would like to keep this medication on hand \"just in case\" he gets sick   "

## 2021-11-04 LAB
ALBUMIN SERPL-MCNC: 4.5 G/DL (ref 3.8–4.8)
ALBUMIN/GLOB SERPL: 1.6 {RATIO} (ref 1.2–2.2)
ALP SERPL-CCNC: 60 IU/L (ref 44–121)
ALT SERPL-CCNC: 16 IU/L (ref 0–44)
AST SERPL-CCNC: 22 IU/L (ref 0–40)
BASOPHILS # BLD AUTO: 0.1 X10E3/UL (ref 0–0.2)
BASOPHILS NFR BLD AUTO: 1 %
BILIRUB SERPL-MCNC: 0.4 MG/DL (ref 0–1.2)
BUN SERPL-MCNC: 9 MG/DL (ref 8–27)
BUN/CREAT SERPL: 12 (ref 10–24)
CALCIUM SERPL-MCNC: 9.3 MG/DL (ref 8.6–10.2)
CHLORIDE SERPL-SCNC: 99 MMOL/L (ref 96–106)
CHOLEST SERPL-MCNC: 160 MG/DL (ref 100–199)
CO2 SERPL-SCNC: 21 MMOL/L (ref 20–29)
CREAT SERPL-MCNC: 0.74 MG/DL (ref 0.76–1.27)
EOSINOPHIL # BLD AUTO: 0.2 X10E3/UL (ref 0–0.4)
EOSINOPHIL NFR BLD AUTO: 4 %
ERYTHROCYTE [DISTWIDTH] IN BLOOD BY AUTOMATED COUNT: 12.8 % (ref 11.6–15.4)
GLOBULIN SER CALC-MCNC: 2.8 G/DL (ref 1.5–4.5)
GLUCOSE SERPL-MCNC: 95 MG/DL (ref 65–99)
HCT VFR BLD AUTO: 44.3 % (ref 37.5–51)
HDLC SERPL-MCNC: 48 MG/DL
HGB BLD-MCNC: 15.4 G/DL (ref 13–17.7)
IMM GRANULOCYTES # BLD AUTO: 0 X10E3/UL (ref 0–0.1)
IMM GRANULOCYTES NFR BLD AUTO: 1 %
IMMATURE CELLS  115398: ABNORMAL
LABORATORY COMMENT REPORT: NORMAL
LDLC SERPL CALC-MCNC: 98 MG/DL (ref 0–99)
LYMPHOCYTES # BLD AUTO: 2 X10E3/UL (ref 0.7–3.1)
LYMPHOCYTES NFR BLD AUTO: 39 %
MCH RBC QN AUTO: 33.8 PG (ref 26.6–33)
MCHC RBC AUTO-ENTMCNC: 34.8 G/DL (ref 31.5–35.7)
MCV RBC AUTO: 97 FL (ref 79–97)
MONOCYTES # BLD AUTO: 0.7 X10E3/UL (ref 0.1–0.9)
MONOCYTES NFR BLD AUTO: 14 %
MORPHOLOGY BLD-IMP: ABNORMAL
NEUTROPHILS # BLD AUTO: 2 X10E3/UL (ref 1.4–7)
NEUTROPHILS NFR BLD AUTO: 41 %
NRBC BLD AUTO-RTO: ABNORMAL %
PLATELET # BLD AUTO: 415 X10E3/UL (ref 150–450)
POTASSIUM SERPL-SCNC: 4.9 MMOL/L (ref 3.5–5.2)
PROT SERPL-MCNC: 7.3 G/DL (ref 6–8.5)
RBC # BLD AUTO: 4.56 X10E6/UL (ref 4.14–5.8)
SODIUM SERPL-SCNC: 131 MMOL/L (ref 134–144)
TRIGL SERPL-MCNC: 70 MG/DL (ref 0–149)
VLDLC SERPL CALC-MCNC: 14 MG/DL (ref 5–40)
WBC # BLD AUTO: 5 X10E3/UL (ref 3.4–10.8)

## 2021-11-09 ENCOUNTER — OFFICE VISIT (OUTPATIENT)
Dept: MEDICAL GROUP | Age: 71
End: 2021-11-09
Payer: MEDICARE

## 2021-11-09 VITALS
OXYGEN SATURATION: 96 % | TEMPERATURE: 97.7 F | DIASTOLIC BLOOD PRESSURE: 62 MMHG | HEART RATE: 81 BPM | HEIGHT: 70 IN | BODY MASS INDEX: 24.31 KG/M2 | WEIGHT: 169.8 LBS | SYSTOLIC BLOOD PRESSURE: 132 MMHG

## 2021-11-09 DIAGNOSIS — I10 ESSENTIAL HYPERTENSION: ICD-10-CM

## 2021-11-09 DIAGNOSIS — J40 CHRONIC SINUSITIS WITH RECURRENT BRONCHITIS: ICD-10-CM

## 2021-11-09 DIAGNOSIS — Z12.5 SCREENING FOR PROSTATE CANCER: ICD-10-CM

## 2021-11-09 DIAGNOSIS — J32.9 CHRONIC SINUSITIS WITH RECURRENT BRONCHITIS: ICD-10-CM

## 2021-11-09 DIAGNOSIS — E78.2 MIXED HYPERLIPIDEMIA: ICD-10-CM

## 2021-11-09 DIAGNOSIS — F51.01 PRIMARY INSOMNIA: ICD-10-CM

## 2021-11-09 PROCEDURE — 99214 OFFICE O/P EST MOD 30 MIN: CPT | Performed by: INTERNAL MEDICINE

## 2021-11-09 RX ORDER — LISINOPRIL 20 MG/1
20 TABLET ORAL DAILY
Qty: 90 TABLET | Refills: 4 | Status: SHIPPED | OUTPATIENT
Start: 2021-11-09 | End: 2022-11-15 | Stop reason: SDUPTHER

## 2021-11-09 RX ORDER — CEPHALEXIN 500 MG/1
CAPSULE ORAL
Qty: 40 CAPSULE | Refills: 1 | Status: SHIPPED | OUTPATIENT
Start: 2021-11-09 | End: 2022-05-10

## 2021-11-09 RX ORDER — TRAZODONE HYDROCHLORIDE 50 MG/1
TABLET ORAL
Qty: 90 TABLET | Refills: 4 | Status: SHIPPED | OUTPATIENT
Start: 2021-11-09 | End: 2022-11-03 | Stop reason: SDUPTHER

## 2021-11-09 RX ORDER — LOVASTATIN 40 MG/1
40 TABLET ORAL DAILY
Qty: 90 TABLET | Refills: 4 | Status: SHIPPED | OUTPATIENT
Start: 2021-11-09 | End: 2022-11-15 | Stop reason: SDUPTHER

## 2021-11-09 ASSESSMENT — ENCOUNTER SYMPTOMS
MUSCULOSKELETAL NEGATIVE: 1
PSYCHIATRIC NEGATIVE: 1
GASTROINTESTINAL NEGATIVE: 1
CONSTITUTIONAL NEGATIVE: 1
RESPIRATORY NEGATIVE: 1
EYES NEGATIVE: 1
CARDIOVASCULAR NEGATIVE: 1
NEUROLOGICAL NEGATIVE: 1

## 2021-11-09 ASSESSMENT — FIBROSIS 4 INDEX: FIB4 SCORE: 0.91

## 2021-11-09 NOTE — PROGRESS NOTES
Subjective     Mauricio Obando is a 71 y.o. male who presents with Follow-Up (6mon F/V) and Lab Results  The patient is here for followup of chronic medical problems listed below. The patient is compliant with medications and having no side effects from them. Denies chest pain, abdominal pain, dyspnea, myalgias, or cough.   Patient Active Problem List    Diagnosis Date Noted   • Seasonal allergic rhinitis due to pollen 04/12/2021   • Essential hypertension  03/22/2018   • Vitamin B 12 deficiency 08/12/2016   • Anxiety 08/02/2013   • Mixed hyperlipidemia 05/01/2012   • Primary insomnia 05/01/2012     Patient has no known allergies.  Outpatient Medications Prior to Visit   Medication Sig Dispense Refill   • cyanocobalamin (VITAMIN B12) 1000 MCG Tab Take 1 Tab by mouth every day. 100 Tab 4   • cephALEXin (KEFLEX) 500 MG Cap TAKE 1 CAPSULE BY MOUTH FOUR TIMES DAILY FOR 10 DAYS 40 capsule 0   • lisinopril (PRINIVIL) 20 MG Tab Take 1 Tab by mouth every day. 90 Tab 4   • lovastatin (MEVACOR) 40 MG tablet Take 1 Tab by mouth every day. 90 Tab 4   • traZODone (DESYREL) 50 MG Tab TAKE 1 TABLET BY MOUTH EVERY NIGHT AT BEDTIME 90 Tab 4     No facility-administered medications prior to visit.     Orders Only on 11/03/2021   Component Date Value   • WBC 11/03/2021 5.0    • RBC 11/03/2021 4.56    • Hemoglobin 11/03/2021 15.4    • Hematocrit 11/03/2021 44.3    • MCV 11/03/2021 97    • MCH 11/03/2021 33.8*   • MCHC 11/03/2021 34.8    • RDW 11/03/2021 12.8    • Platelet Count 11/03/2021 415    • Neutrophils-Polys 11/03/2021 41    • Lymphocytes 11/03/2021 39    • Monocytes 11/03/2021 14    • Eosinophils 11/03/2021 4    • Basophils 11/03/2021 1    • Immature Cells 11/03/2021 CANCELED    • Neutrophils (Absolute) 11/03/2021 2.0    • Lymphs (Absolute) 11/03/2021 2.0    • Monos (Absolute) 11/03/2021 0.7    • Eos (Absolute) 11/03/2021 0.2    • Baso (Absolute) 11/03/2021 0.1    • Immature Granulocytes 11/03/2021 1    • Immature Granulocytes (a*  11/03/2021 0.0    • Nucleated RBC 11/03/2021 CANCELED    • Comments-Diff 11/03/2021 CANCELED    • Glucose 11/03/2021 95    • Bun 11/03/2021 9    • Creatinine 11/03/2021 0.74*   • GFR If Non  Ameri* 11/03/2021 94    • GFR If  11/03/2021 109    • Bun-Creatinine Ratio 11/03/2021 12    • Sodium 11/03/2021 131*   • Potassium 11/03/2021 4.9    • Chloride 11/03/2021 99    • Co2 11/03/2021 21    • Calcium 11/03/2021 9.3    • Total Protein 11/03/2021 7.3    • Albumin 11/03/2021 4.5    • Globulin 11/03/2021 2.8    • A-G Ratio 11/03/2021 1.6    • Total Bilirubin 11/03/2021 0.4    • Alkaline Phosphatase 11/03/2021 60    • AST(SGOT) 11/03/2021 22    • ALT(SGPT) 11/03/2021 16    • Cholesterol,Tot 11/03/2021 160    • Triglycerides 11/03/2021 70    • HDL 11/03/2021 48    • VLDL Cholesterol Calc 11/03/2021 14    • LDL Chol Calc ([facility]) 11/03/2021 98    • Comment: 11/03/2021 CANCELED       Orders Only on 11/03/2021   Component Date Value   • WBC 11/03/2021 5.0    • RBC 11/03/2021 4.56    • Hemoglobin 11/03/2021 15.4    • Hematocrit 11/03/2021 44.3    • MCV 11/03/2021 97    • MCH 11/03/2021 33.8*   • MCHC 11/03/2021 34.8    • RDW 11/03/2021 12.8    • Platelet Count 11/03/2021 415    • Neutrophils-Polys 11/03/2021 41    • Lymphocytes 11/03/2021 39    • Monocytes 11/03/2021 14    • Eosinophils 11/03/2021 4    • Basophils 11/03/2021 1    • Immature Cells 11/03/2021 CANCELED    • Neutrophils (Absolute) 11/03/2021 2.0    • Lymphs (Absolute) 11/03/2021 2.0    • Monos (Absolute) 11/03/2021 0.7    • Eos (Absolute) 11/03/2021 0.2    • Baso (Absolute) 11/03/2021 0.1    • Immature Granulocytes 11/03/2021 1    • Immature Granulocytes (a* 11/03/2021 0.0    • Nucleated RBC 11/03/2021 CANCELED    • Comments-Diff 11/03/2021 CANCELED    • Glucose 11/03/2021 95    • Bun 11/03/2021 9    • Creatinine 11/03/2021 0.74*   • GFR If Non  Ameri* 11/03/2021 94    • GFR If  11/03/2021 109    • Bun-Creatinine Ratio  11/03/2021 12    • Sodium 11/03/2021 131*   • Potassium 11/03/2021 4.9    • Chloride 11/03/2021 99    • Co2 11/03/2021 21    • Calcium 11/03/2021 9.3    • Total Protein 11/03/2021 7.3    • Albumin 11/03/2021 4.5    • Globulin 11/03/2021 2.8    • A-G Ratio 11/03/2021 1.6    • Total Bilirubin 11/03/2021 0.4    • Alkaline Phosphatase 11/03/2021 60    • AST(SGOT) 11/03/2021 22    • ALT(SGPT) 11/03/2021 16    • Cholesterol,Tot 11/03/2021 160    • Triglycerides 11/03/2021 70    • HDL 11/03/2021 48    • VLDL Cholesterol Calc 11/03/2021 14    • LDL Chol Calc ([facility]) 11/03/2021 98    • Comment: 11/03/2021 CANCELED     '  No results found for: HBA1C  Lab Results   Component Value Date/Time    SODIUM 131 (L) 11/03/2021 05:58 AM    SODIUM 128 (L) 04/28/2017 11:01 AM    POTASSIUM 4.9 11/03/2021 05:58 AM    POTASSIUM 4.1 04/28/2017 11:01 AM    CHLORIDE 99 11/03/2021 05:58 AM    CHLORIDE 94 (L) 04/28/2017 11:01 AM    CO2 21 11/03/2021 05:58 AM    CO2 26 04/28/2017 11:01 AM    GLUCOSE 95 11/03/2021 05:58 AM    GLUCOSE 83 04/28/2017 11:01 AM    BUN 9 11/03/2021 05:58 AM    BUN 15 04/28/2017 11:01 AM    CREATININE 0.74 (L) 11/03/2021 05:58 AM    CREATININE 0.63 04/28/2017 11:01 AM    CREATININE 0.88 01/24/2013 09:31 AM    BUNCREATRAT 12 11/03/2021 05:58 AM    BUNCREATRAT 11 01/24/2013 09:31 AM    ALKPHOSPHAT 60 11/03/2021 05:58 AM    ALKPHOSPHAT 82 04/27/2017 03:30 AM    ASTSGOT 22 11/03/2021 05:58 AM    ASTSGOT 37 04/27/2017 03:30 AM    ALTSGPT 16 11/03/2021 05:58 AM    ALTSGPT 48 04/27/2017 03:30 AM    TBILIRUBIN 0.4 11/03/2021 05:58 AM    TBILIRUBIN 1.3 04/27/2017 03:30 AM     Lab Results   Component Value Date/Time    INR 0.86 (L) 04/26/2017 12:35 PM     Lab Results   Component Value Date/Time    CHOLSTRLTOT 160 11/03/2021 05:58 AM    CHOLSTRLTOT 199 01/24/2013 09:31 AM    LDL 81 07/08/2020 06:36 AM     (H) 01/24/2013 09:31 AM    HDL 48 11/03/2021 05:58 AM    HDL 57 01/24/2013 09:31 AM    TRIGLYCERIDE 70 11/03/2021 05:58 AM  "   TRIGLYCERIDE 111 01/24/2013 09:31 AM       No results found for: TESTOSTERONE  Lab Results   Component Value Date/Time    TSH 1.970 03/19/2018 09:54 AM    TSH 1.830 01/29/2014 09:34 AM     No results found for: FREET4  No results found for: URICACID  No components found for: VITB12  Lab Results   Component Value Date/Time    25HYDROXY 31.1 07/31/2014 09:32 AM    25HYDROXY 30.6 01/29/2014 09:34 AM   '             HPI    Review of Systems   Constitutional: Negative.    HENT: Negative.    Eyes: Negative.    Respiratory: Negative.    Cardiovascular: Negative.    Gastrointestinal: Negative.    Genitourinary: Negative.    Musculoskeletal: Negative.    Skin: Negative.    Neurological: Negative.    Endo/Heme/Allergies: Negative.    Psychiatric/Behavioral: Negative.               Objective     /62 (BP Location: Right arm, Patient Position: Sitting, BP Cuff Size: Adult)   Pulse 81   Temp 36.5 °C (97.7 °F) (Temporal)   Ht 1.778 m (5' 10\")   Wt 77 kg (169 lb 12.8 oz)   SpO2 96%   BMI 24.36 kg/m²      Physical Exam  Constitutional:       General: He is not in acute distress.     Appearance: He is well-developed. He is not diaphoretic.   HENT:      Head: Normocephalic and atraumatic.      Right Ear: External ear normal.      Left Ear: External ear normal.      Nose: Nose normal.      Mouth/Throat:      Pharynx: No oropharyngeal exudate.   Eyes:      General: No scleral icterus.        Right eye: No discharge.         Left eye: No discharge.      Conjunctiva/sclera: Conjunctivae normal.      Pupils: Pupils are equal, round, and reactive to light.   Neck:      Thyroid: No thyromegaly.      Vascular: No JVD.      Trachea: No tracheal deviation.   Cardiovascular:      Rate and Rhythm: Normal rate and regular rhythm.      Heart sounds: Normal heart sounds. No murmur heard.  No friction rub. No gallop.    Pulmonary:      Effort: Pulmonary effort is normal. No respiratory distress.      Breath sounds: Normal breath " sounds. No stridor. No wheezing or rales.   Chest:      Chest wall: No tenderness.   Abdominal:      General: Bowel sounds are normal. There is no distension.      Palpations: Abdomen is soft. There is no mass.      Tenderness: There is no abdominal tenderness. There is no guarding or rebound.   Musculoskeletal:         General: No tenderness. Normal range of motion.      Cervical back: Normal range of motion and neck supple.   Lymphadenopathy:      Cervical: No cervical adenopathy.   Skin:     General: Skin is warm and dry.      Coloration: Skin is not pale.      Findings: No erythema or rash.   Neurological:      Mental Status: He is alert and oriented to person, place, and time.      Motor: No abnormal muscle tone.      Coordination: Coordination normal.      Deep Tendon Reflexes: Reflexes are normal and symmetric. Reflexes normal.   Psychiatric:         Behavior: Behavior normal.         Thought Content: Thought content normal.         Judgment: Judgment normal.                            Assessment & Plan        1. Chronic sinusitis with recurrent bronchitis   Under good control. Continue same regimen.    - cephALEXin (KEFLEX) 500 MG Cap; TAKE 1 CAPSULE BY MOUTH FOUR TIMES DAILY FOR 10 DAYS  Dispense: 40 Capsule; Refill: 1    2. Essential hypertension  Under good control. Continue same regimen.       - lisinopril (PRINIVIL) 20 MG Tab; Take 1 Tablet by mouth every day.  Dispense: 90 Tablet; Refill: 4    3. Mixed hyperlipidemia Under good control. Continue same regimen.      - lovastatin (MEVACOR) 40 MG tablet; Take 1 Tablet by mouth every day.  Dispense: 90 Tablet; Refill: 4  - Comp Metabolic Panel; Future  - Lipid Profile; Future  - CBC WITH DIFFERENTIAL; Future    4. Primary insomnia Under good control. Continue same regimen.     - traZODone (DESYREL) 50 MG Tab; TAKE 1 TABLET BY MOUTH EVERY NIGHT AT BEDTIME  Dispense: 90 Tablet; Refill: 4    5. Screening for prostate cancer     - PROSTATE SPECIFIC AG  SCREENING; Future

## 2022-02-15 ENCOUNTER — OFFICE VISIT (OUTPATIENT)
Dept: MEDICAL GROUP | Age: 72
End: 2022-02-15
Payer: MEDICARE

## 2022-02-15 VITALS
HEIGHT: 70 IN | RESPIRATION RATE: 14 BRPM | HEART RATE: 88 BPM | OXYGEN SATURATION: 94 % | BODY MASS INDEX: 24.45 KG/M2 | TEMPERATURE: 98.6 F | SYSTOLIC BLOOD PRESSURE: 104 MMHG | DIASTOLIC BLOOD PRESSURE: 62 MMHG | WEIGHT: 170.8 LBS

## 2022-02-15 DIAGNOSIS — F51.01 PRIMARY INSOMNIA: ICD-10-CM

## 2022-02-15 DIAGNOSIS — I10 ESSENTIAL HYPERTENSION: ICD-10-CM

## 2022-02-15 DIAGNOSIS — E78.2 MIXED HYPERLIPIDEMIA: ICD-10-CM

## 2022-02-15 DIAGNOSIS — F41.9 ANXIETY: ICD-10-CM

## 2022-02-15 DIAGNOSIS — J30.1 SEASONAL ALLERGIC RHINITIS DUE TO POLLEN: ICD-10-CM

## 2022-02-15 PROCEDURE — 99214 OFFICE O/P EST MOD 30 MIN: CPT | Performed by: INTERNAL MEDICINE

## 2022-02-15 RX ORDER — TRIAMCINOLONE ACETONIDE 40 MG/ML
40 INJECTION, SUSPENSION INTRA-ARTICULAR; INTRAMUSCULAR ONCE
Status: COMPLETED | OUTPATIENT
Start: 2022-02-15 | End: 2022-02-15

## 2022-02-15 RX ADMIN — TRIAMCINOLONE ACETONIDE 40 MG: 40 INJECTION, SUSPENSION INTRA-ARTICULAR; INTRAMUSCULAR at 10:20

## 2022-02-15 ASSESSMENT — ENCOUNTER SYMPTOMS
CONSTITUTIONAL NEGATIVE: 1
GASTROINTESTINAL NEGATIVE: 1
NEUROLOGICAL NEGATIVE: 1
CARDIOVASCULAR NEGATIVE: 1
RESPIRATORY NEGATIVE: 1
EYES NEGATIVE: 1
MUSCULOSKELETAL NEGATIVE: 1
PSYCHIATRIC NEGATIVE: 1

## 2022-02-15 ASSESSMENT — FIBROSIS 4 INDEX: FIB4 SCORE: 0.91

## 2022-02-15 NOTE — PROGRESS NOTES
Subjective     Mauricio Obando is a 71 y.o. male who presents with Injections (Kenalog)  The patient is here for followup of chronic medical problems listed below. The patient is compliant with medications and having no side effects from them. Denies chest pain, abdominal pain, dyspnea, myalgias, or cough.   Patient Active Problem List    Diagnosis Date Noted   • Seasonal allergic rhinitis due to pollen 04/12/2021   • Essential hypertension  03/22/2018   • Vitamin B 12 deficiency 08/12/2016   • Anxiety 08/02/2013   • Mixed hyperlipidemia 05/01/2012   • Primary insomnia 05/01/2012     Patient has no known allergies.  Outpatient Medications Prior to Visit   Medication Sig Dispense Refill   • cephALEXin (KEFLEX) 500 MG Cap TAKE 1 CAPSULE BY MOUTH FOUR TIMES DAILY FOR 10 DAYS 40 Capsule 1   • lisinopril (PRINIVIL) 20 MG Tab Take 1 Tablet by mouth every day. 90 Tablet 4   • lovastatin (MEVACOR) 40 MG tablet Take 1 Tablet by mouth every day. 90 Tablet 4   • traZODone (DESYREL) 50 MG Tab TAKE 1 TABLET BY MOUTH EVERY NIGHT AT BEDTIME 90 Tablet 4   • cyanocobalamin (VITAMIN B12) 1000 MCG Tab Take 1 Tab by mouth every day. 100 Tab 4     No facility-administered medications prior to visit.     No visits with results within 1 Month(s) from this visit.   Latest known visit with results is:   Orders Only on 11/03/2021   Component Date Value   • WBC 11/03/2021 5.0    • RBC 11/03/2021 4.56    • Hemoglobin 11/03/2021 15.4    • Hematocrit 11/03/2021 44.3    • MCV 11/03/2021 97    • MCH 11/03/2021 33.8*   • MCHC 11/03/2021 34.8    • RDW 11/03/2021 12.8    • Platelet Count 11/03/2021 415    • Neutrophils-Polys 11/03/2021 41    • Lymphocytes 11/03/2021 39    • Monocytes 11/03/2021 14    • Eosinophils 11/03/2021 4    • Basophils 11/03/2021 1    • Immature Cells 11/03/2021 CANCELED    • Neutrophils (Absolute) 11/03/2021 2.0    • Lymphs (Absolute) 11/03/2021 2.0    • Monos (Absolute) 11/03/2021 0.7    • Eos (Absolute) 11/03/2021 0.2    • Emiliao  "(Absolute) 11/03/2021 0.1    • Immature Granulocytes 11/03/2021 1    • Immature Granulocytes (a* 11/03/2021 0.0    • Nucleated RBC 11/03/2021 CANCELED    • Comments-Diff 11/03/2021 CANCELED    • Glucose 11/03/2021 95    • Bun 11/03/2021 9    • Creatinine 11/03/2021 0.74*   • GFR If Non  Ameri* 11/03/2021 94    • GFR If  11/03/2021 109    • Bun-Creatinine Ratio 11/03/2021 12    • Sodium 11/03/2021 131*   • Potassium 11/03/2021 4.9    • Chloride 11/03/2021 99    • Co2 11/03/2021 21    • Calcium 11/03/2021 9.3    • Total Protein 11/03/2021 7.3    • Albumin 11/03/2021 4.5    • Globulin 11/03/2021 2.8    • A-G Ratio 11/03/2021 1.6    • Total Bilirubin 11/03/2021 0.4    • Alkaline Phosphatase 11/03/2021 60    • AST(SGOT) 11/03/2021 22    • ALT(SGPT) 11/03/2021 16    • Cholesterol,Tot 11/03/2021 160    • Triglycerides 11/03/2021 70    • HDL 11/03/2021 48    • VLDL Cholesterol Calc 11/03/2021 14    • LDL Chol Calc ([facility]) 11/03/2021 98    • Comment: 11/03/2021 CANCELED       ...            HPI    Review of Systems   Constitutional: Negative.    HENT: Positive for congestion.    Eyes: Negative.    Respiratory: Negative.    Cardiovascular: Negative.    Gastrointestinal: Negative.    Genitourinary: Negative.    Musculoskeletal: Negative.    Skin: Negative.    Neurological: Negative.    Endo/Heme/Allergies: Negative.    Psychiatric/Behavioral: Negative.               Objective     /62 (BP Location: Right arm, Patient Position: Sitting, BP Cuff Size: Adult)   Pulse 88   Temp 37 °C (98.6 °F)   Resp 14   Ht 1.778 m (5' 10\")   Wt 77.5 kg (170 lb 12.8 oz)   SpO2 94%   BMI 24.51 kg/m²      Physical Exam  Constitutional:       General: He is not in acute distress.     Appearance: He is well-developed. He is not diaphoretic.   HENT:      Head: Normocephalic and atraumatic.      Right Ear: External ear normal.      Left Ear: External ear normal.      Nose: Nose normal.      Mouth/Throat:      Pharynx: " No oropharyngeal exudate.   Eyes:      General: No scleral icterus.        Right eye: No discharge.         Left eye: No discharge.      Conjunctiva/sclera: Conjunctivae normal.      Pupils: Pupils are equal, round, and reactive to light.   Neck:      Thyroid: No thyromegaly.      Vascular: No JVD.      Trachea: No tracheal deviation.   Cardiovascular:      Rate and Rhythm: Normal rate and regular rhythm.      Heart sounds: Normal heart sounds. No murmur heard.  No friction rub. No gallop.    Pulmonary:      Effort: Pulmonary effort is normal. No respiratory distress.      Breath sounds: Normal breath sounds. No stridor. No wheezing or rales.   Chest:      Chest wall: No tenderness.   Abdominal:      General: Bowel sounds are normal. There is no distension.      Palpations: Abdomen is soft. There is no mass.      Tenderness: There is no abdominal tenderness. There is no guarding or rebound.   Musculoskeletal:         General: No tenderness. Normal range of motion.      Cervical back: Normal range of motion and neck supple.   Lymphadenopathy:      Cervical: No cervical adenopathy.   Skin:     General: Skin is warm and dry.      Coloration: Skin is not pale.      Findings: No erythema or rash.   Neurological:      Mental Status: He is alert and oriented to person, place, and time.      Motor: No abnormal muscle tone.      Coordination: Coordination normal (.goo).      Deep Tendon Reflexes: Reflexes are normal and symmetric. Reflexes normal.   Psychiatric:         Behavior: Behavior normal.         Thought Content: Thought content normal.         Judgment: Judgment normal.                  No visits with results within 1 Month(s) from this visit.   Latest known visit with results is:   Orders Only on 11/03/2021   Component Date Value   • WBC 11/03/2021 5.0    • RBC 11/03/2021 4.56    • Hemoglobin 11/03/2021 15.4    • Hematocrit 11/03/2021 44.3    • MCV 11/03/2021 97    • MCH 11/03/2021 33.8*   • MCHC 11/03/2021 34.8     • RDW 11/03/2021 12.8    • Platelet Count 11/03/2021 415    • Neutrophils-Polys 11/03/2021 41    • Lymphocytes 11/03/2021 39    • Monocytes 11/03/2021 14    • Eosinophils 11/03/2021 4    • Basophils 11/03/2021 1    • Immature Cells 11/03/2021 CANCELED    • Neutrophils (Absolute) 11/03/2021 2.0    • Lymphs (Absolute) 11/03/2021 2.0    • Monos (Absolute) 11/03/2021 0.7    • Eos (Absolute) 11/03/2021 0.2    • Baso (Absolute) 11/03/2021 0.1    • Immature Granulocytes 11/03/2021 1    • Immature Granulocytes (a* 11/03/2021 0.0    • Nucleated RBC 11/03/2021 CANCELED    • Comments-Diff 11/03/2021 CANCELED    • Glucose 11/03/2021 95    • Bun 11/03/2021 9    • Creatinine 11/03/2021 0.74*   • GFR If Non  Ameri* 11/03/2021 94    • GFR If  11/03/2021 109    • Bun-Creatinine Ratio 11/03/2021 12    • Sodium 11/03/2021 131*   • Potassium 11/03/2021 4.9    • Chloride 11/03/2021 99    • Co2 11/03/2021 21    • Calcium 11/03/2021 9.3    • Total Protein 11/03/2021 7.3    • Albumin 11/03/2021 4.5    • Globulin 11/03/2021 2.8    • A-G Ratio 11/03/2021 1.6    • Total Bilirubin 11/03/2021 0.4    • Alkaline Phosphatase 11/03/2021 60    • AST(SGOT) 11/03/2021 22    • ALT(SGPT) 11/03/2021 16    • Cholesterol,Tot 11/03/2021 160    • Triglycerides 11/03/2021 70    • HDL 11/03/2021 48    • VLDL Cholesterol Calc 11/03/2021 14    • LDL Chol Calc ([facility]) 11/03/2021 98    • Comment: 11/03/2021 CANCELED       No results found for: HBA1C  Lab Results   Component Value Date/Time    SODIUM 131 (L) 11/03/2021 05:58 AM    SODIUM 128 (L) 04/28/2017 11:01 AM    POTASSIUM 4.9 11/03/2021 05:58 AM    POTASSIUM 4.1 04/28/2017 11:01 AM    CHLORIDE 99 11/03/2021 05:58 AM    CHLORIDE 94 (L) 04/28/2017 11:01 AM    CO2 21 11/03/2021 05:58 AM    CO2 26 04/28/2017 11:01 AM    GLUCOSE 95 11/03/2021 05:58 AM    GLUCOSE 83 04/28/2017 11:01 AM    BUN 9 11/03/2021 05:58 AM    BUN 15 04/28/2017 11:01 AM    CREATININE 0.74 (L) 11/03/2021 05:58 AM     CREATININE 0.63 04/28/2017 11:01 AM    CREATININE 0.88 01/24/2013 09:31 AM    BUNCREATRAT 12 11/03/2021 05:58 AM    BUNCREATRAT 11 01/24/2013 09:31 AM    ALKPHOSPHAT 60 11/03/2021 05:58 AM    ALKPHOSPHAT 82 04/27/2017 03:30 AM    ASTSGOT 22 11/03/2021 05:58 AM    ASTSGOT 37 04/27/2017 03:30 AM    ALTSGPT 16 11/03/2021 05:58 AM    ALTSGPT 48 04/27/2017 03:30 AM    TBILIRUBIN 0.4 11/03/2021 05:58 AM    TBILIRUBIN 1.3 04/27/2017 03:30 AM     Lab Results   Component Value Date/Time    INR 0.86 (L) 04/26/2017 12:35 PM     Lab Results   Component Value Date/Time    CHOLSTRLTOT 160 11/03/2021 05:58 AM    CHOLSTRLTOT 199 01/24/2013 09:31 AM    LDL 81 07/08/2020 06:36 AM     (H) 01/24/2013 09:31 AM    HDL 48 11/03/2021 05:58 AM    HDL 57 01/24/2013 09:31 AM    TRIGLYCERIDE 70 11/03/2021 05:58 AM    TRIGLYCERIDE 111 01/24/2013 09:31 AM       No results found for: TESTOSTERONE  Lab Results   Component Value Date/Time    TSH 1.970 03/19/2018 09:54 AM    TSH 1.830 01/29/2014 09:34 AM     No results found for: FREET4  No results found for: URICACID  No components found for: VITB12  Lab Results   Component Value Date/Time    25HYDROXY 31.1 07/31/2014 09:32 AM    25HYDROXY 30.6 01/29/2014 09:34 AM   '               Assessment & Plan        1. Seasonal allergic rhinitis due to pollen      - triamcinolone acetonide (KENALOG-40) injection 40 mg    2. Mixed hyperlipidemia     Under good control. Continue same regimen.    - Lipid Profile; Future  - TSH; Future  - Comp Metabolic Panel; Future  - CBC WITH DIFFERENTIAL; Future    3. Primary insomnia  Under good control. Continue same regimen.        4. Anxiety   Under good control. Continue same regimen.      5. Essential hypertension   Under good control. Continue same regimen.     - Lipid Profile; Future  - TSH; Future  - Comp Metabolic Panel; Future  - CBC WITH DIFFERENTIAL; Future

## 2022-05-04 LAB
ALBUMIN SERPL-MCNC: 4.5 G/DL (ref 3.8–4.8)
ALBUMIN/GLOB SERPL: 1.5 {RATIO} (ref 1.2–2.2)
ALP SERPL-CCNC: 68 IU/L (ref 44–121)
ALT SERPL-CCNC: 20 IU/L (ref 0–44)
AST SERPL-CCNC: 26 IU/L (ref 0–40)
BASOPHILS # BLD AUTO: 0.1 X10E3/UL (ref 0–0.2)
BASOPHILS NFR BLD AUTO: 1 %
BILIRUB SERPL-MCNC: 0.5 MG/DL (ref 0–1.2)
BUN SERPL-MCNC: 10 MG/DL (ref 8–27)
BUN/CREAT SERPL: 13 (ref 10–24)
CALCIUM SERPL-MCNC: 9.5 MG/DL (ref 8.6–10.2)
CHLORIDE SERPL-SCNC: 95 MMOL/L (ref 96–106)
CHOLEST SERPL-MCNC: 166 MG/DL (ref 100–199)
CO2 SERPL-SCNC: 21 MMOL/L (ref 20–29)
CREAT SERPL-MCNC: 0.76 MG/DL (ref 0.76–1.27)
EGFRCR SERPLBLD CKD-EPI 2021: 97 ML/MIN/1.73
EOSINOPHIL # BLD AUTO: 0.2 X10E3/UL (ref 0–0.4)
EOSINOPHIL NFR BLD AUTO: 4 %
ERYTHROCYTE [DISTWIDTH] IN BLOOD BY AUTOMATED COUNT: 13 % (ref 11.6–15.4)
GLOBULIN SER CALC-MCNC: 3 G/DL (ref 1.5–4.5)
GLUCOSE SERPL-MCNC: 89 MG/DL (ref 65–99)
HCT VFR BLD AUTO: 43.9 % (ref 37.5–51)
HDLC SERPL-MCNC: 56 MG/DL
HGB BLD-MCNC: 15.1 G/DL (ref 13–17.7)
IMM GRANULOCYTES # BLD AUTO: 0.1 X10E3/UL (ref 0–0.1)
IMM GRANULOCYTES NFR BLD AUTO: 1 %
IMMATURE CELLS  115398: ABNORMAL
LABORATORY COMMENT REPORT: NORMAL
LDLC SERPL CALC-MCNC: 95 MG/DL (ref 0–99)
LYMPHOCYTES # BLD AUTO: 2 X10E3/UL (ref 0.7–3.1)
LYMPHOCYTES NFR BLD AUTO: 36 %
MCH RBC QN AUTO: 34.1 PG (ref 26.6–33)
MCHC RBC AUTO-ENTMCNC: 34.4 G/DL (ref 31.5–35.7)
MCV RBC AUTO: 99 FL (ref 79–97)
MONOCYTES # BLD AUTO: 0.7 X10E3/UL (ref 0.1–0.9)
MONOCYTES NFR BLD AUTO: 13 %
MORPHOLOGY BLD-IMP: ABNORMAL
NEUTROPHILS # BLD AUTO: 2.6 X10E3/UL (ref 1.4–7)
NEUTROPHILS NFR BLD AUTO: 45 %
NRBC BLD AUTO-RTO: ABNORMAL %
PLATELET # BLD AUTO: 426 X10E3/UL (ref 150–450)
POTASSIUM SERPL-SCNC: 4.9 MMOL/L (ref 3.5–5.2)
PROT SERPL-MCNC: 7.5 G/DL (ref 6–8.5)
PSA SERPL-MCNC: 0.4 NG/ML (ref 0–4)
RBC # BLD AUTO: 4.43 X10E6/UL (ref 4.14–5.8)
SODIUM SERPL-SCNC: 129 MMOL/L (ref 134–144)
TRIGL SERPL-MCNC: 82 MG/DL (ref 0–149)
VLDLC SERPL CALC-MCNC: 15 MG/DL (ref 5–40)
WBC # BLD AUTO: 5.7 X10E3/UL (ref 3.4–10.8)

## 2022-05-10 ENCOUNTER — OFFICE VISIT (OUTPATIENT)
Dept: MEDICAL GROUP | Age: 72
End: 2022-05-10
Payer: MEDICARE

## 2022-05-10 VITALS
RESPIRATION RATE: 16 BRPM | HEIGHT: 70 IN | BODY MASS INDEX: 23.88 KG/M2 | SYSTOLIC BLOOD PRESSURE: 126 MMHG | TEMPERATURE: 97.6 F | DIASTOLIC BLOOD PRESSURE: 64 MMHG | WEIGHT: 166.8 LBS | OXYGEN SATURATION: 97 % | HEART RATE: 89 BPM

## 2022-05-10 DIAGNOSIS — J30.2 CHRONIC SEASONAL ALLERGIC RHINITIS: ICD-10-CM

## 2022-05-10 DIAGNOSIS — J40 CHRONIC SINUSITIS WITH RECURRENT BRONCHITIS: ICD-10-CM

## 2022-05-10 DIAGNOSIS — J32.9 CHRONIC SINUSITIS WITH RECURRENT BRONCHITIS: ICD-10-CM

## 2022-05-10 DIAGNOSIS — E78.2 MIXED HYPERLIPIDEMIA: ICD-10-CM

## 2022-05-10 DIAGNOSIS — I10 ESSENTIAL HYPERTENSION: ICD-10-CM

## 2022-05-10 DIAGNOSIS — E55.9 VITAMIN D DEFICIENCY: ICD-10-CM

## 2022-05-10 DIAGNOSIS — Z12.5 SCREENING FOR PROSTATE CANCER: ICD-10-CM

## 2022-05-10 PROCEDURE — 99214 OFFICE O/P EST MOD 30 MIN: CPT | Performed by: INTERNAL MEDICINE

## 2022-05-10 RX ORDER — CEPHALEXIN 500 MG/1
CAPSULE ORAL
Qty: 40 CAPSULE | Refills: 1 | Status: SHIPPED | OUTPATIENT
Start: 2022-05-10 | End: 2022-11-15

## 2022-05-10 RX ORDER — TRIAMCINOLONE ACETONIDE 40 MG/ML
40 INJECTION, SUSPENSION INTRA-ARTICULAR; INTRAMUSCULAR ONCE
Status: COMPLETED | OUTPATIENT
Start: 2022-05-10 | End: 2022-05-10

## 2022-05-10 RX ADMIN — TRIAMCINOLONE ACETONIDE 40 MG: 40 INJECTION, SUSPENSION INTRA-ARTICULAR; INTRAMUSCULAR at 10:07

## 2022-05-10 ASSESSMENT — PATIENT HEALTH QUESTIONNAIRE - PHQ9: CLINICAL INTERPRETATION OF PHQ2 SCORE: 0

## 2022-05-10 ASSESSMENT — FIBROSIS 4 INDEX: FIB4 SCORE: 0.94

## 2022-05-10 NOTE — PROGRESS NOTES
"Subjective     Mauricio Obando is a 71 y.o. male who presents with Follow-Up (Lab review / kenalog shot )  The patient is here for followup of chronic medical problems listed below. The patient is compliant with medications and having no side effects from them. Denies chest pain, abdominal pain, dyspnea, myalgias, or cough.   Patient Active Problem List    Diagnosis Date Noted   • Seasonal allergic rhinitis due to pollen 04/12/2021   • Essential hypertension  03/22/2018   • Vitamin B 12 deficiency 08/12/2016   • Anxiety 08/02/2013   • Mixed hyperlipidemia 05/01/2012   • Primary insomnia 05/01/2012     Outpatient Medications Prior to Visit   Medication Sig Dispense Refill   • lisinopril (PRINIVIL) 20 MG Tab Take 1 Tablet by mouth every day. 90 Tablet 4   • lovastatin (MEVACOR) 40 MG tablet Take 1 Tablet by mouth every day. 90 Tablet 4   • traZODone (DESYREL) 50 MG Tab TAKE 1 TABLET BY MOUTH EVERY NIGHT AT BEDTIME 90 Tablet 4   • cyanocobalamin (VITAMIN B12) 1000 MCG Tab Take 1 Tab by mouth every day. 100 Tab 4   • cephALEXin (KEFLEX) 500 MG Cap TAKE 1 CAPSULE BY MOUTH FOUR TIMES DAILY FOR 10 DAYS 40 Capsule 1     No facility-administered medications prior to visit.     No Known Allergies            HPI    Review of Systems   HENT: Positive for congestion.               Objective     /64 (BP Location: Left arm, Patient Position: Sitting, BP Cuff Size: Adult)   Pulse 89   Temp 36.4 °C (97.6 °F) (Temporal)   Resp 16   Ht 1.778 m (5' 10\")   Wt 75.7 kg (166 lb 12.8 oz)   SpO2 97%   BMI 23.93 kg/m²      Physical Exam  Constitutional:       General: He is not in acute distress.     Appearance: He is well-developed. He is not diaphoretic.   HENT:      Head: Normocephalic and atraumatic.      Right Ear: External ear normal.      Left Ear: External ear normal.      Nose: Nose normal.      Mouth/Throat:      Pharynx: No oropharyngeal exudate.   Eyes:      General: No scleral icterus.        Right eye: No discharge.    "      Left eye: No discharge.      Conjunctiva/sclera: Conjunctivae normal.      Pupils: Pupils are equal, round, and reactive to light.   Neck:      Thyroid: No thyromegaly.      Vascular: No JVD.      Trachea: No tracheal deviation.   Cardiovascular:      Rate and Rhythm: Normal rate and regular rhythm.      Heart sounds: Normal heart sounds. No murmur heard.    No friction rub. No gallop.   Pulmonary:      Effort: Pulmonary effort is normal. No respiratory distress.      Breath sounds: Normal breath sounds. No stridor. No wheezing or rales.   Chest:      Chest wall: No tenderness.   Abdominal:      General: Bowel sounds are normal. There is no distension.      Palpations: Abdomen is soft. There is no mass.      Tenderness: There is no abdominal tenderness. There is no guarding or rebound.   Musculoskeletal:         General: No tenderness. Normal range of motion.      Cervical back: Normal range of motion and neck supple.   Lymphadenopathy:      Cervical: No cervical adenopathy.   Skin:     General: Skin is warm and dry.      Coloration: Skin is not pale.      Findings: No erythema or rash.   Neurological:      Mental Status: He is alert and oriented to person, place, and time.      Motor: No abnormal muscle tone.      Coordination: Coordination normal.      Deep Tendon Reflexes: Reflexes are normal and symmetric. Reflexes normal.   Psychiatric:         Behavior: Behavior normal.         Thought Content: Thought content normal.         Judgment: Judgment normal.       .....  No visits with results within 1 Month(s) from this visit.   Latest known visit with results is:   Orders Only on 11/03/2021   Component Date Value   • WBC 11/03/2021 5.0    • RBC 11/03/2021 4.56    • Hemoglobin 11/03/2021 15.4    • Hematocrit 11/03/2021 44.3    • MCV 11/03/2021 97    • MCH 11/03/2021 33.8 (A)   • MCHC 11/03/2021 34.8    • RDW 11/03/2021 12.8    • Platelet Count 11/03/2021 415    • Neutrophils-Polys 11/03/2021 41    •  Lymphocytes 11/03/2021 39    • Monocytes 11/03/2021 14    • Eosinophils 11/03/2021 4    • Basophils 11/03/2021 1    • Immature Cells 11/03/2021 CANCELED    • Neutrophils (Absolute) 11/03/2021 2.0    • Lymphs (Absolute) 11/03/2021 2.0    • Monos (Absolute) 11/03/2021 0.7    • Eos (Absolute) 11/03/2021 0.2    • Baso (Absolute) 11/03/2021 0.1    • Immature Granulocytes 11/03/2021 1    • Immature Granulocytes (a* 11/03/2021 0.0    • Nucleated RBC 11/03/2021 CANCELED    • Comments-Diff 11/03/2021 CANCELED    • Glucose 11/03/2021 95    • Bun 11/03/2021 9    • Creatinine 11/03/2021 0.74 (A)   • GFR If Non  [facility]* 11/03/2021 94    • GFR If  11/03/2021 109    • Bun-Creatinine Ratio 11/03/2021 12    • Sodium 11/03/2021 131 (A)   • Potassium 11/03/2021 4.9    • Chloride 11/03/2021 99    • Co2 11/03/2021 21    • Calcium 11/03/2021 9.3    • Total Protein 11/03/2021 7.3    • Albumin 11/03/2021 4.5    • Globulin 11/03/2021 2.8    • A-G Ratio 11/03/2021 1.6    • Total Bilirubin 11/03/2021 0.4    • Alkaline Phosphatase 11/03/2021 60    • AST(SGOT) 11/03/2021 22    • ALT(SGPT) 11/03/2021 16    • Cholesterol,Tot 11/03/2021 160    • Triglycerides 11/03/2021 70    • HDL 11/03/2021 48    • VLDL Cholesterol Calc 11/03/2021 14    • LDL Chol Calc ([facility]) 11/03/2021 98    • Comment: 11/03/2021 CANCELED    • WBC 05/03/2022 5.7    • RBC 05/03/2022 4.43    • Hemoglobin 05/03/2022 15.1    • Hematocrit 05/03/2022 43.9    • MCV 05/03/2022 99 (A)   • MCH 05/03/2022 34.1 (A)   • MCHC 05/03/2022 34.4    • RDW 05/03/2022 13.0    • Platelet Count 05/03/2022 426    • Neutrophils-Polys 05/03/2022 45    • Lymphocytes 05/03/2022 36    • Monocytes 05/03/2022 13    • Eosinophils 05/03/2022 4    • Basophils 05/03/2022 1    • Immature Cells 05/03/2022 CANCELED    • Neutrophils (Absolute) 05/03/2022 2.6    • Lymphs (Absolute) 05/03/2022 2.0    • Monos (Absolute) 05/03/2022 0.7    • Eos (Absolute) 05/03/2022 0.2    • Baso (Absolute)  05/03/2022 0.1    • Immature Granulocytes 05/03/2022 1    • Immature Granulocytes (a* 05/03/2022 0.1    • Nucleated RBC 05/03/2022 CANCELED    • Comments-Diff 05/03/2022 CANCELED    • Glucose 05/03/2022 89    • Bun 05/03/2022 10    • Creatinine 05/03/2022 0.76    • eGFR 05/03/2022 97    • Bun-Creatinine Ratio 05/03/2022 13    • Sodium 05/03/2022 129 (A)   • Potassium 05/03/2022 4.9    • Chloride 05/03/2022 95 (A)   • Co2 05/03/2022 21    • Calcium 05/03/2022 9.5    • Total Protein 05/03/2022 7.5    • Albumin 05/03/2022 4.5    • Globulin 05/03/2022 3.0    • A-G Ratio 05/03/2022 1.5    • Total Bilirubin 05/03/2022 0.5    • Alkaline Phosphatase 05/03/2022 68    • AST(SGOT) 05/03/2022 26    • ALT(SGPT) 05/03/2022 20    • Cholesterol,Tot 05/03/2022 166    • Triglycerides 05/03/2022 82    • HDL 05/03/2022 56    • VLDL Cholesterol Calc 05/03/2022 15    • LDL Chol Calc ([facility]) 05/03/2022 95    • Comment: 05/03/2022 CANCELED    • Prostatic Specific Antig* 05/03/2022 0.4       No results found for: HBA1C  Lab Results   Component Value Date/Time    SODIUM 129 (L) 05/03/2022 05:58 AM    SODIUM 128 (L) 04/28/2017 11:01 AM    POTASSIUM 4.9 05/03/2022 05:58 AM    POTASSIUM 4.1 04/28/2017 11:01 AM    CHLORIDE 95 (L) 05/03/2022 05:58 AM    CHLORIDE 94 (L) 04/28/2017 11:01 AM    CO2 21 05/03/2022 05:58 AM    CO2 26 04/28/2017 11:01 AM    GLUCOSE 89 05/03/2022 05:58 AM    GLUCOSE 83 04/28/2017 11:01 AM    BUN 10 05/03/2022 05:58 AM    BUN 15 04/28/2017 11:01 AM    CREATININE 0.76 05/03/2022 05:58 AM    CREATININE 0.63 04/28/2017 11:01 AM    CREATININE 0.88 01/24/2013 09:31 AM    BUNCREATRAT 13 05/03/2022 05:58 AM    BUNCREATRAT 11 01/24/2013 09:31 AM    ALKPHOSPHAT 68 05/03/2022 05:58 AM    ALKPHOSPHAT 82 04/27/2017 03:30 AM    ASTSGOT 26 05/03/2022 05:58 AM    ASTSGOT 37 04/27/2017 03:30 AM    ALTSGPT 20 05/03/2022 05:58 AM    ALTSGPT 48 04/27/2017 03:30 AM    TBILIRUBIN 0.5 05/03/2022 05:58 AM    TBILIRUBIN 1.3 04/27/2017 03:30 AM      Lab Results   Component Value Date/Time    INR 0.86 (L) 04/26/2017 12:35 PM     Lab Results   Component Value Date/Time    CHOLSTRLTOT 166 05/03/2022 05:58 AM    CHOLSTRLTOT 199 01/24/2013 09:31 AM    LDL 81 07/08/2020 06:36 AM     (H) 01/24/2013 09:31 AM    HDL 56 05/03/2022 05:58 AM    HDL 57 01/24/2013 09:31 AM    TRIGLYCERIDE 82 05/03/2022 05:58 AM    TRIGLYCERIDE 111 01/24/2013 09:31 AM       No results found for: TESTOSTERONE  Lab Results   Component Value Date/Time    TSH 1.970 03/19/2018 09:54 AM    TSH 1.830 01/29/2014 09:34 AM     No results found for: FREET4  No results found for: URICACID  No components found for: VITB12  Lab Results   Component Value Date/Time    25HYDROXY 31.1 07/31/2014 09:32 AM    25HYDROXY 30.6 01/29/2014 09:34 AM                           Assessment & Plan        1. Chronic sinusitis with recurrent bronchitis  Has mild bacterial superinfection will treat with antibiotics.  - cephALEXin (KEFLEX) 500 MG Cap; TAKE 1 CAPSULE BY MOUTH FOUR TIMES DAILY FOR 10 DAYS  Dispense: 40 Capsule; Refill: 1    2. Mixed hyperlipidemia  Good control continue current regimen good control  - TSH; Future  - Comp Metabolic Panel; Future  - Lipid Profile; Future  - CBC WITH DIFFERENTIAL; Future    3. Essential hypertension     Good control continue same regimen  4. Screening for prostate cancer       PSA  5. Vitamin D deficiency     - VITAMIN D,25 HYDROXY; Future    6. Chronic seasonal allergic rhinitis  Mild flareup.  Shot of Kenalog given.  - triamcinolone acetonide (KENALOG-40) injection 40 mg

## 2022-11-03 ENCOUNTER — TELEPHONE (OUTPATIENT)
Dept: MEDICAL GROUP | Age: 72
End: 2022-11-03
Payer: MEDICARE

## 2022-11-03 DIAGNOSIS — F51.01 PRIMARY INSOMNIA: ICD-10-CM

## 2022-11-03 RX ORDER — TRAZODONE HYDROCHLORIDE 50 MG/1
TABLET ORAL
Qty: 90 TABLET | Refills: 4 | Status: SHIPPED | OUTPATIENT
Start: 2022-11-03 | End: 2023-05-16 | Stop reason: SDUPTHER

## 2022-11-03 NOTE — TELEPHONE ENCOUNTER
Received request via: Patient    Was the patient seen in the last year in this department? Yes    Does the patient have an active prescription (recently filled or refills available) for medication(s) requested? No      Patient has appt with you this month but he needs a refill he is about to run out

## 2022-11-09 LAB
25(OH)D3+25(OH)D2 SERPL-MCNC: 44.7 NG/ML (ref 30–100)
ALBUMIN SERPL-MCNC: 4.7 G/DL (ref 3.8–4.8)
ALBUMIN/GLOB SERPL: 1.6 {RATIO} (ref 1.2–2.2)
ALP SERPL-CCNC: 72 IU/L (ref 44–121)
ALT SERPL-CCNC: 19 IU/L (ref 0–44)
AST SERPL-CCNC: 24 IU/L (ref 0–40)
BASOPHILS # BLD AUTO: 0.1 X10E3/UL (ref 0–0.2)
BASOPHILS NFR BLD AUTO: 1 %
BILIRUB SERPL-MCNC: 0.6 MG/DL (ref 0–1.2)
BUN SERPL-MCNC: 10 MG/DL (ref 8–27)
BUN/CREAT SERPL: 12 (ref 10–24)
CALCIUM SERPL-MCNC: 9.8 MG/DL (ref 8.6–10.2)
CHLORIDE SERPL-SCNC: 94 MMOL/L (ref 96–106)
CHOLEST SERPL-MCNC: 163 MG/DL (ref 100–199)
CO2 SERPL-SCNC: 21 MMOL/L (ref 20–29)
CREAT SERPL-MCNC: 0.84 MG/DL (ref 0.76–1.27)
EGFRCR SERPLBLD CKD-EPI 2021: 94 ML/MIN/1.73
EOSINOPHIL # BLD AUTO: 0.4 X10E3/UL (ref 0–0.4)
EOSINOPHIL NFR BLD AUTO: 6 %
ERYTHROCYTE [DISTWIDTH] IN BLOOD BY AUTOMATED COUNT: 12.5 % (ref 11.6–15.4)
GLOBULIN SER CALC-MCNC: 2.9 G/DL (ref 1.5–4.5)
GLUCOSE SERPL-MCNC: 105 MG/DL (ref 70–99)
HCT VFR BLD AUTO: 44.5 % (ref 37.5–51)
HDLC SERPL-MCNC: 54 MG/DL
HGB BLD-MCNC: 15.5 G/DL (ref 13–17.7)
IMM GRANULOCYTES # BLD AUTO: 0.1 X10E3/UL (ref 0–0.1)
IMM GRANULOCYTES NFR BLD AUTO: 1 %
IMMATURE CELLS  115398: ABNORMAL
LABORATORY COMMENT REPORT: NORMAL
LDLC SERPL CALC-MCNC: 94 MG/DL (ref 0–99)
LYMPHOCYTES # BLD AUTO: 2.1 X10E3/UL (ref 0.7–3.1)
LYMPHOCYTES NFR BLD AUTO: 33 %
MCH RBC QN AUTO: 34.5 PG (ref 26.6–33)
MCHC RBC AUTO-ENTMCNC: 34.8 G/DL (ref 31.5–35.7)
MCV RBC AUTO: 99 FL (ref 79–97)
MONOCYTES # BLD AUTO: 0.9 X10E3/UL (ref 0.1–0.9)
MONOCYTES NFR BLD AUTO: 15 %
MORPHOLOGY BLD-IMP: ABNORMAL
NEUTROPHILS # BLD AUTO: 2.8 X10E3/UL (ref 1.4–7)
NEUTROPHILS NFR BLD AUTO: 44 %
NRBC BLD AUTO-RTO: ABNORMAL %
PLATELET # BLD AUTO: 468 X10E3/UL (ref 150–450)
POTASSIUM SERPL-SCNC: 5.2 MMOL/L (ref 3.5–5.2)
PROT SERPL-MCNC: 7.6 G/DL (ref 6–8.5)
RBC # BLD AUTO: 4.49 X10E6/UL (ref 4.14–5.8)
SODIUM SERPL-SCNC: 130 MMOL/L (ref 134–144)
TRIGL SERPL-MCNC: 82 MG/DL (ref 0–149)
TSH SERPL DL<=0.005 MIU/L-ACNC: 1.85 UIU/ML (ref 0.45–4.5)
VLDLC SERPL CALC-MCNC: 15 MG/DL (ref 5–40)
WBC # BLD AUTO: 6.3 X10E3/UL (ref 3.4–10.8)

## 2022-11-15 ENCOUNTER — OFFICE VISIT (OUTPATIENT)
Dept: MEDICAL GROUP | Age: 72
End: 2022-11-15
Payer: MEDICARE

## 2022-11-15 VITALS
TEMPERATURE: 97.3 F | SYSTOLIC BLOOD PRESSURE: 130 MMHG | WEIGHT: 173 LBS | HEART RATE: 91 BPM | DIASTOLIC BLOOD PRESSURE: 78 MMHG | BODY MASS INDEX: 24.77 KG/M2 | HEIGHT: 70 IN | RESPIRATION RATE: 16 BRPM | OXYGEN SATURATION: 94 %

## 2022-11-15 DIAGNOSIS — F51.01 PRIMARY INSOMNIA: ICD-10-CM

## 2022-11-15 DIAGNOSIS — E55.9 VITAMIN D DEFICIENCY: ICD-10-CM

## 2022-11-15 DIAGNOSIS — R73.01 IFG (IMPAIRED FASTING GLUCOSE): ICD-10-CM

## 2022-11-15 DIAGNOSIS — J30.2 SEASONAL ALLERGIC RHINITIS, UNSPECIFIED TRIGGER: ICD-10-CM

## 2022-11-15 DIAGNOSIS — I10 ESSENTIAL HYPERTENSION: ICD-10-CM

## 2022-11-15 DIAGNOSIS — F41.9 ANXIETY: ICD-10-CM

## 2022-11-15 DIAGNOSIS — E53.8 VITAMIN B 12 DEFICIENCY: ICD-10-CM

## 2022-11-15 DIAGNOSIS — N40.0 BPH WITHOUT URINARY OBSTRUCTION: ICD-10-CM

## 2022-11-15 DIAGNOSIS — E78.2 MIXED HYPERLIPIDEMIA: ICD-10-CM

## 2022-11-15 DIAGNOSIS — Z23 NEED FOR VACCINATION: ICD-10-CM

## 2022-11-15 PROCEDURE — 99214 OFFICE O/P EST MOD 30 MIN: CPT | Performed by: INTERNAL MEDICINE

## 2022-11-15 RX ORDER — FLUTICASONE PROPIONATE 50 MCG
2 SPRAY, SUSPENSION (ML) NASAL 2 TIMES DAILY
Qty: 18.2 ML | Refills: 5 | Status: SHIPPED | OUTPATIENT
Start: 2022-11-15 | End: 2023-05-16 | Stop reason: SDUPTHER

## 2022-11-15 RX ORDER — TRIAMCINOLONE ACETONIDE 40 MG/ML
40 INJECTION, SUSPENSION INTRA-ARTICULAR; INTRAMUSCULAR ONCE
Status: COMPLETED | OUTPATIENT
Start: 2022-11-15 | End: 2022-11-15

## 2022-11-15 RX ORDER — LOVASTATIN 40 MG/1
40 TABLET ORAL DAILY
Qty: 90 TABLET | Refills: 4 | Status: SHIPPED | OUTPATIENT
Start: 2022-11-15 | End: 2023-05-16 | Stop reason: SDUPTHER

## 2022-11-15 RX ORDER — LISINOPRIL 20 MG/1
20 TABLET ORAL DAILY
Qty: 90 TABLET | Refills: 4 | Status: SHIPPED | OUTPATIENT
Start: 2022-11-15 | End: 2023-05-16 | Stop reason: SDUPTHER

## 2022-11-15 RX ADMIN — TRIAMCINOLONE ACETONIDE 40 MG: 40 INJECTION, SUSPENSION INTRA-ARTICULAR; INTRAMUSCULAR at 09:31

## 2022-11-15 ASSESSMENT — ENCOUNTER SYMPTOMS
NEUROLOGICAL NEGATIVE: 1
RESPIRATORY NEGATIVE: 1
MUSCULOSKELETAL NEGATIVE: 1
CONSTITUTIONAL NEGATIVE: 1
EYES NEGATIVE: 1
GASTROINTESTINAL NEGATIVE: 1
PSYCHIATRIC NEGATIVE: 1
CARDIOVASCULAR NEGATIVE: 1

## 2022-11-15 ASSESSMENT — FIBROSIS 4 INDEX: FIB4 SCORE: 0.82

## 2022-11-15 NOTE — PROGRESS NOTES
Subjective     Mauricio Obando is a 72 y.o. male who presents with Follow-Up (Lab review / needs a kenalog shot )  The patient is here for followup of chronic medical problems listed below. The patient is compliant with medications and having no side effects from them. Denies chest pain, abdominal pain, dyspnea, myalgias, or cough.   .prop  Outpatient Medications Prior to Visit   Medication Sig Dispense Refill    traZODone (DESYREL) 50 MG Tab TAKE 1 TABLET BY MOUTH EVERY NIGHT AT BEDTIME 90 Tablet 4    cephALEXin (KEFLEX) 500 MG Cap TAKE 1 CAPSULE BY MOUTH FOUR TIMES DAILY FOR 10 DAYS (Patient not taking: Reported on 11/15/2022) 40 Capsule 1    lisinopril (PRINIVIL) 20 MG Tab Take 1 Tablet by mouth every day. 90 Tablet 4    lovastatin (MEVACOR) 40 MG tablet Take 1 Tablet by mouth every day. 90 Tablet 4    cyanocobalamin (VITAMIN B12) 1000 MCG Tab Take 1 Tablet by mouth every day. 100 Tab 4     No facility-administered medications prior to visit.     No Known Allergies  Orders Only on 11/03/2022   Component Date Value    WBC 11/08/2022 6.3     RBC 11/08/2022 4.49     Hemoglobin 11/08/2022 15.5     Hematocrit 11/08/2022 44.5     MCV 11/08/2022 99 (H)     MCH 11/08/2022 34.5 (H)     MCHC 11/08/2022 34.8     RDW 11/08/2022 12.5     Platelet Count 11/08/2022 468 (H)     Neutrophils-Polys 11/08/2022 44     Lymphocytes 11/08/2022 33     Monocytes 11/08/2022 15     Eosinophils 11/08/2022 6     Basophils 11/08/2022 1     Immature Cells 11/08/2022 CANCELED     Neutrophils (Absolute) 11/08/2022 2.8     Lymphs (Absolute) 11/08/2022 2.1     Monos (Absolute) 11/08/2022 0.9     Eos (Absolute) 11/08/2022 0.4     Baso (Absolute) 11/08/2022 0.1     Immature Granulocytes 11/08/2022 1     Immature Granulocytes (a* 11/08/2022 0.1     Nucleated RBC 11/08/2022 CANCELED     Comments-Diff 11/08/2022 CANCELED     Glucose 11/08/2022 105 (H)     Bun 11/08/2022 10     Creatinine 11/08/2022 0.84     eGFR 11/08/2022 94     Bun-Creatinine Ratio  11/08/2022 12     Sodium 11/08/2022 130 (L)     Potassium 11/08/2022 5.2     Chloride 11/08/2022 94 (L)     Co2 11/08/2022 21     Calcium 11/08/2022 9.8     Total Protein 11/08/2022 7.6     Albumin 11/08/2022 4.7     Globulin 11/08/2022 2.9     A-G Ratio 11/08/2022 1.6     Total Bilirubin 11/08/2022 0.6     Alkaline Phosphatase 11/08/2022 72     AST(SGOT) 11/08/2022 24     ALT(SGPT) 11/08/2022 19     Cholesterol,Tot 11/08/2022 163     Triglycerides 11/08/2022 82     HDL 11/08/2022 54     VLDL Cholesterol Calc 11/08/2022 15     LDL Chol Calc ([facility]) 11/08/2022 94     Comment: 11/08/2022 CANCELED     TSH 11/08/2022 1.850     25-Hydroxy   Vitamin D 25 11/08/2022 44.7       No results found for: HBA1C  Lab Results   Component Value Date/Time    SODIUM 130 (L) 11/08/2022 05:26 AM    SODIUM 128 (L) 04/28/2017 11:01 AM    POTASSIUM 5.2 11/08/2022 05:26 AM    POTASSIUM 4.1 04/28/2017 11:01 AM    CHLORIDE 94 (L) 11/08/2022 05:26 AM    CHLORIDE 94 (L) 04/28/2017 11:01 AM    CO2 21 11/08/2022 05:26 AM    CO2 26 04/28/2017 11:01 AM    GLUCOSE 105 (H) 11/08/2022 05:26 AM    GLUCOSE 83 04/28/2017 11:01 AM    BUN 10 11/08/2022 05:26 AM    BUN 15 04/28/2017 11:01 AM    CREATININE 0.84 11/08/2022 05:26 AM    CREATININE 0.63 04/28/2017 11:01 AM    CREATININE 0.88 01/24/2013 09:31 AM    BUNCREATRAT 12 11/08/2022 05:26 AM    BUNCREATRAT 11 01/24/2013 09:31 AM    ALKPHOSPHAT 72 11/08/2022 05:26 AM    ALKPHOSPHAT 82 04/27/2017 03:30 AM    ASTSGOT 24 11/08/2022 05:26 AM    ASTSGOT 37 04/27/2017 03:30 AM    ALTSGPT 19 11/08/2022 05:26 AM    ALTSGPT 48 04/27/2017 03:30 AM    TBILIRUBIN 0.6 11/08/2022 05:26 AM    TBILIRUBIN 1.3 04/27/2017 03:30 AM     Lab Results   Component Value Date/Time    INR 0.86 (L) 04/26/2017 12:35 PM     Lab Results   Component Value Date/Time    CHOLSTRLTOT 163 11/08/2022 05:26 AM    CHOLSTRLTOT 199 01/24/2013 09:31 AM    LDL 81 07/08/2020 06:36 AM     (H) 01/24/2013 09:31 AM    HDL 54 11/08/2022 05:26 AM     "HDL 57 01/24/2013 09:31 AM    TRIGLYCERIDE 82 11/08/2022 05:26 AM    TRIGLYCERIDE 111 01/24/2013 09:31 AM       No results found for: TESTOSTERONE  Lab Results   Component Value Date/Time    TSH 1.850 11/08/2022 05:26 AM    TSH 1.970 03/19/2018 09:54 AM     No results found for: FREET4  No results found for: URICACID  No components found for: VITB12  Lab Results   Component Value Date/Time    25HYDROXY 44.7 11/08/2022 05:26 AM    25HYDROXY 31.1 07/31/2014 09:32 AM         Since the patient's last visit been doing well except for flareup of his seasonal allergic rhinitis.  Would like a Kenalog shot which is worked well for him in the past.  He usually gets his twice a year.  Spring and fall.  Spring and fall            HPI    Review of Systems   Constitutional: Negative.    HENT:  Positive for congestion.    Eyes: Negative.    Respiratory: Negative.     Cardiovascular: Negative.    Gastrointestinal: Negative.    Genitourinary: Negative.    Musculoskeletal: Negative.    Skin: Negative.    Neurological: Negative.    Endo/Heme/Allergies: Negative.    Psychiatric/Behavioral: Negative.              Objective     /78 (BP Location: Right arm, Patient Position: Sitting, BP Cuff Size: Adult)   Pulse 91   Temp 36.3 °C (97.3 °F) (Temporal)   Resp 16   Ht 1.778 m (5' 10\")   Wt 78.5 kg (173 lb)   SpO2 94%   BMI 24.82 kg/m²      Physical Exam  Constitutional:       General: He is not in acute distress.     Appearance: He is well-developed. He is not diaphoretic.   HENT:      Head: Normocephalic and atraumatic.      Right Ear: External ear normal.      Left Ear: External ear normal.      Nose: Congestion and rhinorrhea present.      Mouth/Throat:      Pharynx: No oropharyngeal exudate.   Eyes:      General: No scleral icterus.        Right eye: No discharge.         Left eye: No discharge.      Conjunctiva/sclera: Conjunctivae normal.      Pupils: Pupils are equal, round, and reactive to light.   Neck:      Thyroid: No " thyromegaly.      Vascular: No JVD.      Trachea: No tracheal deviation.   Cardiovascular:      Rate and Rhythm: Normal rate and regular rhythm.      Heart sounds: Normal heart sounds. No murmur heard.    No friction rub. No gallop.   Pulmonary:      Effort: Pulmonary effort is normal. No respiratory distress.      Breath sounds: Normal breath sounds. No stridor. No wheezing or rales.   Chest:      Chest wall: No tenderness.   Abdominal:      General: Bowel sounds are normal. There is no distension.      Palpations: Abdomen is soft. There is no mass.      Tenderness: There is no abdominal tenderness. There is no guarding or rebound.   Musculoskeletal:         General: No tenderness. Normal range of motion.      Cervical back: Normal range of motion and neck supple.   Lymphadenopathy:      Cervical: No cervical adenopathy.   Skin:     General: Skin is warm and dry.      Coloration: Skin is not pale.      Findings: No erythema or rash.   Neurological:      Mental Status: He is alert and oriented to person, place, and time.      Motor: No abnormal muscle tone.      Coordination: Coordination normal.      Deep Tendon Reflexes: Reflexes are normal and symmetric. Reflexes normal.   Psychiatric:         Behavior: Behavior normal.         Thought Content: Thought content normal.         Judgment: Judgment normal.                           Assessment & Plan           1. Mixed hyperlipidemia   Under good control. Continue same regimen.    - TSH; Future  - Comp Metabolic Panel; Future  - Lipid Profile; Future  - CBC WITH DIFFERENTIAL; Future  - lovastatin (MEVACOR) 40 MG tablet; Take 1 Tablet by mouth every day.  Dispense: 90 Tablet; Refill: 4    2. Primary insomnia       Under good control. Continue same regimen.  Continue trazodone 50 mg at bedtime  3. Anxiety      Under good control. Continue same regimen.  4. Essential hypertension     Under good control. Continue same regimen.  - lisinopril (PRINIVIL) 20 MG Tab; Take 1  Tablet by mouth every day.  Dispense: 90 Tablet; Refill: 4    5. Seasonal allergic rhinitis, unspecified trigger     - triamcinolone acetonide (KENALOG-40) injection 40 mg  - fluticasone (FLONASE) 50 MCG/ACT nasal spray; Administer 2 Sprays into affected nostril(S) 2 times a day.  Dispense: 18.2 mL; Refill: 5    6. Vitamin D deficiency    Under good control. Continue same regimen.  Continue vitamin D3 1000 units daily.  OTC.    7. BPH wi Under good control. Continue same regimen.thout urinary obstruction-  Under good control. Continue same regimen.'  - PROSTATE SPECIFIC AG DIAGNOSTIC; Future    8. IFG (impaired fasting glucose)   Under good control. Continue same regimen.'  - HEMOGLOBIN A1C; Future    9. Vitamin B 12 deficiency   Under good control. Continue same regimen.'  - cyanocobalamin (VITAMIN B12) 1000 MCG Tab; Take 1 Tablet by mouth every day.  Dispense: 100 Tablet; Refill: 4    10. Need for vaccination     - Zoster Vac Recomb Adjuvanted (SHINGRIX) 50 MCG/0.5ML Recon Susp; Inject 0.5 mL into the shoulder, thigh, or buttocks one time for 1 dose.  Dispense: 0.5 mL; Refill: 0

## 2023-05-10 LAB
25(OH)D3+25(OH)D2 SERPL-MCNC: 36.9 NG/ML (ref 30–100)
ALBUMIN SERPL-MCNC: 4.3 G/DL (ref 3.8–4.8)
ALBUMIN/GLOB SERPL: 1.5 {RATIO} (ref 1.2–2.2)
ALP SERPL-CCNC: 73 IU/L (ref 44–121)
ALT SERPL-CCNC: 15 IU/L (ref 0–44)
AST SERPL-CCNC: 21 IU/L (ref 0–40)
BASOPHILS # BLD AUTO: 0.1 X10E3/UL (ref 0–0.2)
BASOPHILS NFR BLD AUTO: 1 %
BILIRUB SERPL-MCNC: 0.7 MG/DL (ref 0–1.2)
BUN SERPL-MCNC: 6 MG/DL (ref 8–27)
BUN/CREAT SERPL: 8 (ref 10–24)
CALCIUM SERPL-MCNC: 9.7 MG/DL (ref 8.6–10.2)
CHLORIDE SERPL-SCNC: 97 MMOL/L (ref 96–106)
CHOLEST SERPL-MCNC: 168 MG/DL (ref 100–199)
CO2 SERPL-SCNC: 21 MMOL/L (ref 20–29)
CREAT SERPL-MCNC: 0.8 MG/DL (ref 0.76–1.27)
EGFRCR SERPLBLD CKD-EPI 2021: 95 ML/MIN/1.73
EOSINOPHIL # BLD AUTO: 0.4 X10E3/UL (ref 0–0.4)
EOSINOPHIL NFR BLD AUTO: 7 %
ERYTHROCYTE [DISTWIDTH] IN BLOOD BY AUTOMATED COUNT: 13.2 % (ref 11.6–15.4)
GLOBULIN SER CALC-MCNC: 2.9 G/DL (ref 1.5–4.5)
GLUCOSE SERPL-MCNC: 100 MG/DL (ref 70–99)
HBA1C MFR BLD: 5.5 % (ref 4.8–5.6)
HCT VFR BLD AUTO: 46.7 % (ref 37.5–51)
HDLC SERPL-MCNC: 51 MG/DL
HGB BLD-MCNC: 16 G/DL (ref 13–17.7)
IMM GRANULOCYTES # BLD AUTO: 0.1 X10E3/UL (ref 0–0.1)
IMM GRANULOCYTES NFR BLD AUTO: 1 %
IMMATURE CELLS  115398: NORMAL
LABORATORY COMMENT REPORT: NORMAL
LDLC SERPL CALC-MCNC: 99 MG/DL (ref 0–99)
LYMPHOCYTES # BLD AUTO: 2.3 X10E3/UL (ref 0.7–3.1)
LYMPHOCYTES NFR BLD AUTO: 38 %
MCH RBC QN AUTO: 32.9 PG (ref 26.6–33)
MCHC RBC AUTO-ENTMCNC: 34.3 G/DL (ref 31.5–35.7)
MCV RBC AUTO: 96 FL (ref 79–97)
MONOCYTES # BLD AUTO: 0.7 X10E3/UL (ref 0.1–0.9)
MONOCYTES NFR BLD AUTO: 12 %
MORPHOLOGY BLD-IMP: NORMAL
NEUTROPHILS # BLD AUTO: 2.4 X10E3/UL (ref 1.4–7)
NEUTROPHILS NFR BLD AUTO: 41 %
NRBC BLD AUTO-RTO: NORMAL %
PLATELET # BLD AUTO: 430 X10E3/UL (ref 150–450)
POTASSIUM SERPL-SCNC: 4.8 MMOL/L (ref 3.5–5.2)
PROT SERPL-MCNC: 7.2 G/DL (ref 6–8.5)
PSA SERPL-MCNC: 0.3 NG/ML (ref 0–4)
RBC # BLD AUTO: 4.87 X10E6/UL (ref 4.14–5.8)
SODIUM SERPL-SCNC: 131 MMOL/L (ref 134–144)
TRIGL SERPL-MCNC: 96 MG/DL (ref 0–149)
TSH SERPL DL<=0.005 MIU/L-ACNC: 1.98 UIU/ML (ref 0.45–4.5)
VLDLC SERPL CALC-MCNC: 18 MG/DL (ref 5–40)
WBC # BLD AUTO: 5.9 X10E3/UL (ref 3.4–10.8)

## 2023-05-16 ENCOUNTER — OFFICE VISIT (OUTPATIENT)
Dept: MEDICAL GROUP | Age: 73
End: 2023-05-16
Payer: MEDICARE

## 2023-05-16 VITALS
SYSTOLIC BLOOD PRESSURE: 126 MMHG | HEIGHT: 70 IN | WEIGHT: 164 LBS | HEART RATE: 89 BPM | TEMPERATURE: 97 F | DIASTOLIC BLOOD PRESSURE: 70 MMHG | OXYGEN SATURATION: 95 % | BODY MASS INDEX: 23.48 KG/M2

## 2023-05-16 DIAGNOSIS — F51.01 PRIMARY INSOMNIA: ICD-10-CM

## 2023-05-16 DIAGNOSIS — Z23 NEED FOR VACCINATION: ICD-10-CM

## 2023-05-16 DIAGNOSIS — J30.2 SEASONAL ALLERGIC RHINITIS, UNSPECIFIED TRIGGER: ICD-10-CM

## 2023-05-16 DIAGNOSIS — E78.2 MIXED HYPERLIPIDEMIA: ICD-10-CM

## 2023-05-16 DIAGNOSIS — E53.8 VITAMIN B 12 DEFICIENCY: ICD-10-CM

## 2023-05-16 DIAGNOSIS — N40.0 BPH WITHOUT URINARY OBSTRUCTION: ICD-10-CM

## 2023-05-16 DIAGNOSIS — E55.9 VITAMIN D DEFICIENCY: ICD-10-CM

## 2023-05-16 DIAGNOSIS — R73.01 IFG (IMPAIRED FASTING GLUCOSE): ICD-10-CM

## 2023-05-16 DIAGNOSIS — I10 ESSENTIAL HYPERTENSION: ICD-10-CM

## 2023-05-16 PROCEDURE — 3074F SYST BP LT 130 MM HG: CPT | Performed by: INTERNAL MEDICINE

## 2023-05-16 PROCEDURE — 3078F DIAST BP <80 MM HG: CPT | Performed by: INTERNAL MEDICINE

## 2023-05-16 PROCEDURE — 99214 OFFICE O/P EST MOD 30 MIN: CPT | Mod: 25 | Performed by: INTERNAL MEDICINE

## 2023-05-16 RX ORDER — FLUTICASONE PROPIONATE 50 MCG
2 SPRAY, SUSPENSION (ML) NASAL 2 TIMES DAILY
Qty: 18.2 ML | Refills: 5 | Status: SHIPPED | OUTPATIENT
Start: 2023-05-16

## 2023-05-16 RX ORDER — LISINOPRIL 20 MG/1
20 TABLET ORAL DAILY
Qty: 90 TABLET | Refills: 4 | Status: SHIPPED | OUTPATIENT
Start: 2023-05-16 | End: 2023-11-16 | Stop reason: SDUPTHER

## 2023-05-16 RX ORDER — TRAZODONE HYDROCHLORIDE 50 MG/1
TABLET ORAL
Qty: 90 TABLET | Refills: 4 | Status: SHIPPED | OUTPATIENT
Start: 2023-05-16 | End: 2023-11-16 | Stop reason: SDUPTHER

## 2023-05-16 RX ORDER — LOVASTATIN 40 MG/1
40 TABLET ORAL DAILY
Qty: 90 TABLET | Refills: 4 | Status: SHIPPED | OUTPATIENT
Start: 2023-05-16 | End: 2023-11-16 | Stop reason: SDUPTHER

## 2023-05-16 RX ORDER — TRIAMCINOLONE ACETONIDE 40 MG/ML
40 INJECTION, SUSPENSION INTRA-ARTICULAR; INTRAMUSCULAR ONCE
Status: COMPLETED | OUTPATIENT
Start: 2023-05-16 | End: 2023-05-16

## 2023-05-16 RX ADMIN — TRIAMCINOLONE ACETONIDE 40 MG: 40 INJECTION, SUSPENSION INTRA-ARTICULAR; INTRAMUSCULAR at 11:29

## 2023-05-16 ASSESSMENT — ENCOUNTER SYMPTOMS
MUSCULOSKELETAL NEGATIVE: 1
GASTROINTESTINAL NEGATIVE: 1
RESPIRATORY NEGATIVE: 1
NEUROLOGICAL NEGATIVE: 1
CARDIOVASCULAR NEGATIVE: 1
CONSTITUTIONAL NEGATIVE: 1
PSYCHIATRIC NEGATIVE: 1
EYES NEGATIVE: 1

## 2023-05-16 ASSESSMENT — PATIENT HEALTH QUESTIONNAIRE - PHQ9: CLINICAL INTERPRETATION OF PHQ2 SCORE: 0

## 2023-05-16 ASSESSMENT — FIBROSIS 4 INDEX: FIB4 SCORE: 0.88

## 2023-05-16 NOTE — PROGRESS NOTES
Subjective     Mauricio Obando is a 72 y.o. male who presents with Follow-Up (Lab review kenalog shot )    The patient is here for followup of chronic medical problems listed below. The patient is compliant with medications and having no side effects from them. Denies chest pain, abdominal pain, dyspnea, myalgias, or cough.   Patient Active Problem List   Diagnosis    Mixed hyperlipidemia    Primary insomnia    Anxiety    Vitamin B 12 deficiency    Essential hypertension     Seasonal allergic rhinitis due to pollen     Outpatient Medications Prior to Visit   Medication Sig Dispense Refill    lisinopril (PRINIVIL) 20 MG Tab Take 1 Tablet by mouth every day. 90 Tablet 4    lovastatin (MEVACOR) 40 MG tablet Take 1 Tablet by mouth every day. 90 Tablet 4    cyanocobalamin (VITAMIN B12) 1000 MCG Tab Take 1 Tablet by mouth every day. 100 Tablet 4    fluticasone (FLONASE) 50 MCG/ACT nasal spray Administer 2 Sprays into affected nostril(S) 2 times a day. 18.2 mL 5    traZODone (DESYREL) 50 MG Tab TAKE 1 TABLET BY MOUTH EVERY NIGHT AT BEDTIME 90 Tablet 4     No facility-administered medications prior to visit.     No Known Allergies  No visits with results within 1 Month(s) from this visit.   Latest known visit with results is:   Orders Only on 11/03/2022   Component Date Value    WBC 11/08/2022 6.3     RBC 11/08/2022 4.49     Hemoglobin 11/08/2022 15.5     Hematocrit 11/08/2022 44.5     MCV 11/08/2022 99 (H)     MCH 11/08/2022 34.5 (H)     MCHC 11/08/2022 34.8     RDW 11/08/2022 12.5     Platelet Count 11/08/2022 468 (H)     Neutrophils-Polys 11/08/2022 44     Lymphocytes 11/08/2022 33     Monocytes 11/08/2022 15     Eosinophils 11/08/2022 6     Basophils 11/08/2022 1     Immature Cells 11/08/2022 CANCELED     Neutrophils (Absolute) 11/08/2022 2.8     Lymphs (Absolute) 11/08/2022 2.1     Monos (Absolute) 11/08/2022 0.9     Eos (Absolute) 11/08/2022 0.4     Baso (Absolute) 11/08/2022 0.1     Immature Granulocytes 11/08/2022 1      Immature Granulocytes (a* 11/08/2022 0.1     Nucleated RBC 11/08/2022 CANCELED     Comments-Diff 11/08/2022 CANCELED     Glucose 11/08/2022 105 (H)     Bun 11/08/2022 10     Creatinine 11/08/2022 0.84     eGFR 11/08/2022 94     Bun-Creatinine Ratio 11/08/2022 12     Sodium 11/08/2022 130 (L)     Potassium 11/08/2022 5.2     Chloride 11/08/2022 94 (L)     Co2 11/08/2022 21     Calcium 11/08/2022 9.8     Total Protein 11/08/2022 7.6     Albumin 11/08/2022 4.7     Globulin 11/08/2022 2.9     A-G Ratio 11/08/2022 1.6     Total Bilirubin 11/08/2022 0.6     Alkaline Phosphatase 11/08/2022 72     AST(SGOT) 11/08/2022 24     ALT(SGPT) 11/08/2022 19     Cholesterol,Tot 11/08/2022 163     Triglycerides 11/08/2022 82     HDL 11/08/2022 54     VLDL Cholesterol Calc 11/08/2022 15     LDL Chol Calc ([facility]) 11/08/2022 94     Comment: 11/08/2022 CANCELED     TSH 11/08/2022 1.850     25-Hydroxy   Vitamin D 25 11/08/2022 44.7     WBC 05/09/2023 5.9     RBC 05/09/2023 4.87     Hemoglobin 05/09/2023 16.0     Hematocrit 05/09/2023 46.7     MCV 05/09/2023 96     MCH 05/09/2023 32.9     MCHC 05/09/2023 34.3     RDW 05/09/2023 13.2     Platelet Count 05/09/2023 430     Neutrophils-Polys 05/09/2023 41     Lymphocytes 05/09/2023 38     Monocytes 05/09/2023 12     Eosinophils 05/09/2023 7     Basophils 05/09/2023 1     Immature Cells 05/09/2023 CANCELED     Neutrophils (Absolute) 05/09/2023 2.4     Lymphs (Absolute) 05/09/2023 2.3     Monos (Absolute) 05/09/2023 0.7     Eos (Absolute) 05/09/2023 0.4     Baso (Absolute) 05/09/2023 0.1     Immature Granulocytes 05/09/2023 1     Immature Granulocytes (a* 05/09/2023 0.1     Nucleated RBC 05/09/2023 CANCELED     Comments-Diff 05/09/2023 CANCELED     Glucose 05/09/2023 100 (H)     Bun 05/09/2023 6 (L)     Creatinine 05/09/2023 0.80     eGFR 05/09/2023 95     Bun-Creatinine Ratio 05/09/2023 8 (L)     Sodium 05/09/2023 131 (L)     Potassium 05/09/2023 4.8     Chloride 05/09/2023 97     Co2 05/09/2023  21     Calcium 05/09/2023 9.7     Total Protein 05/09/2023 7.2     Albumin 05/09/2023 4.3     Globulin 05/09/2023 2.9     A-G Ratio 05/09/2023 1.5     Total Bilirubin 05/09/2023 0.7     Alkaline Phosphatase 05/09/2023 73     AST(SGOT) 05/09/2023 21     ALT(SGPT) 05/09/2023 15     Cholesterol,Tot 05/09/2023 168     Triglycerides 05/09/2023 96     HDL 05/09/2023 51     VLDL Cholesterol Calc 05/09/2023 18     LDL Chol Calc ([facility]) 05/09/2023 99     Comment: 05/09/2023 CANCELED     Glycohemoglobin 05/09/2023 5.5     TSH 05/09/2023 1.980     Prostatic Specific Antig* 05/09/2023 0.3     25-Hydroxy   Vitamin D 25 05/09/2023 36.9       Lab Results   Component Value Date/Time    HBA1C 5.5 05/09/2023 05:51 AM     Lab Results   Component Value Date/Time    SODIUM 131 (L) 05/09/2023 05:51 AM    SODIUM 128 (L) 04/28/2017 11:01 AM    POTASSIUM 4.8 05/09/2023 05:51 AM    POTASSIUM 4.1 04/28/2017 11:01 AM    CHLORIDE 97 05/09/2023 05:51 AM    CHLORIDE 94 (L) 04/28/2017 11:01 AM    CO2 21 05/09/2023 05:51 AM    CO2 26 04/28/2017 11:01 AM    GLUCOSE 100 (H) 05/09/2023 05:51 AM    GLUCOSE 83 04/28/2017 11:01 AM    BUN 6 (L) 05/09/2023 05:51 AM    BUN 15 04/28/2017 11:01 AM    CREATININE 0.80 05/09/2023 05:51 AM    CREATININE 0.63 04/28/2017 11:01 AM    CREATININE 0.88 01/24/2013 09:31 AM    BUNCREATRAT 8 (L) 05/09/2023 05:51 AM    BUNCREATRAT 11 01/24/2013 09:31 AM    ALKPHOSPHAT 73 05/09/2023 05:51 AM    ALKPHOSPHAT 82 04/27/2017 03:30 AM    ASTSGOT 21 05/09/2023 05:51 AM    ASTSGOT 37 04/27/2017 03:30 AM    ALTSGPT 15 05/09/2023 05:51 AM    ALTSGPT 48 04/27/2017 03:30 AM    TBILIRUBIN 0.7 05/09/2023 05:51 AM    TBILIRUBIN 1.3 04/27/2017 03:30 AM     Lab Results   Component Value Date/Time    INR 0.86 (L) 04/26/2017 12:35 PM     Lab Results   Component Value Date/Time    CHOLSTRLTOT 168 05/09/2023 05:51 AM    CHOLSTRLTOT 199 01/24/2013 09:31 AM    LDL 81 07/08/2020 06:36 AM     (H) 01/24/2013 09:31 AM    HDL 51 05/09/2023 05:51  "AM    HDL 57 01/24/2013 09:31 AM    TRIGLYCERIDE 96 05/09/2023 05:51 AM    TRIGLYCERIDE 111 01/24/2013 09:31 AM       No results found for: TESTOSTERONE  Lab Results   Component Value Date/Time    TSH 1.980 05/09/2023 05:51 AM    TSH 1.850 11/08/2022 05:26 AM     No results found for: FREET4  No results found for: URICACID  No components found for: VITB12  Lab Results   Component Value Date/Time    25HYDROXY 36.9 05/09/2023 05:51 AM    25HYDROXY 44.7 11/08/2022 05:26 AM             HPI    Review of Systems   Constitutional: Negative.    HENT: Negative.     Eyes: Negative.    Respiratory: Negative.     Cardiovascular: Negative.    Gastrointestinal: Negative.    Genitourinary: Negative.    Musculoskeletal: Negative.    Skin: Negative.    Neurological: Negative.    Endo/Heme/Allergies: Negative.    Psychiatric/Behavioral: Negative.                Objective     /70 (BP Location: Left arm, Patient Position: Sitting, BP Cuff Size: Adult)   Pulse 89   Temp 36.1 °C (97 °F) (Temporal)   Ht 1.778 m (5' 10\")   Wt 74.4 kg (164 lb)   SpO2 95%   BMI 23.53 kg/m²      Physical Exam  Constitutional:       General: He is not in acute distress.     Appearance: He is well-developed. He is not diaphoretic.   HENT:      Head: Normocephalic and atraumatic.      Right Ear: External ear normal.      Left Ear: External ear normal.      Nose: Nose normal.      Mouth/Throat:      Pharynx: No oropharyngeal exudate.   Eyes:      General: No scleral icterus.        Right eye: No discharge.         Left eye: No discharge.      Conjunctiva/sclera: Conjunctivae normal.      Pupils: Pupils are equal, round, and reactive to light.   Neck:      Thyroid: No thyromegaly.      Vascular: No JVD.      Trachea: No tracheal deviation.   Cardiovascular:      Rate and Rhythm: Normal rate and regular rhythm.      Heart sounds: Normal heart sounds. No murmur heard.     No friction rub. No gallop.   Pulmonary:      Effort: Pulmonary effort is normal. " No respiratory distress.      Breath sounds: Normal breath sounds. No stridor. No wheezing or rales.   Chest:      Chest wall: No tenderness.   Abdominal:      General: Bowel sounds are normal. There is no distension.      Palpations: Abdomen is soft. There is no mass.      Tenderness: There is no abdominal tenderness. There is no guarding or rebound.   Musculoskeletal:         General: No tenderness. Normal range of motion.      Cervical back: Normal range of motion and neck supple.   Lymphadenopathy:      Cervical: No cervical adenopathy.   Skin:     General: Skin is warm and dry.      Coloration: Skin is not pale.      Findings: No erythema or rash.   Neurological:      Mental Status: He is alert and oriented to person, place, and time.      Motor: No abnormal muscle tone.      Coordination: Coordination normal.      Deep Tendon Reflexes: Reflexes are normal and symmetric. Reflexes normal.   Psychiatric:         Behavior: Behavior normal.         Thought Content: Thought content normal.         Judgment: Judgment normal.                              Assessment & Plan        1. Seasonal allergic rhinitis, unspecified trigger  Not well controlled.  Kenalog shot given.  Patient needs to get back on Flonase as he was only taking it on a as needed basis and felt that 1 nasal spray should help relieve his symptoms.  I explained that he would have to take it for at least 1 to 2 weeks before he would notice a noticeable difference.  - triamcinolone acetonide (KENALOG-40) injection 40 mg  - fluticasone (FLONASE) 50 MCG/ACT nasal spray; Administer 2 Sprays into affected nostril(S) 2 times a day.  Dispense: 18.2 mL; Refill: 5    2. Need for vaccination     - Zoster Vac Recomb Adjuvanted (SHINGRIX) 50 MCG/0.5ML Recon Susp; Inject 0.5 mL into the shoulder, thigh, or buttocks one time for 1 dose.  Dispense: 0.5 mL; Refill: 0    3. Essential hypertension   Good control continue current regimen-   lisinopril (PRINIVIL) 20 MG  Tab; Take 1 Tablet by mouth every day.  Dispense: 90 Tablet; Refill: 4    4. Mixed hyperlipidemia  Good control continue current regimen  - Comp Metabolic Panel; Future  - CBC WITH DIFFERENTIAL; Future  - TSH; Future  - Lipid Profile; Future  - lovastatin (MEVACOR) 40 MG tablet; Take 1 Tablet by mouth every day.  Dispense: 90 Tablet; Refill: 4    5. IFG (impaired fasting glucose)   Good control continue current regimen-   - HEMOGLOBIN A1C; Future    6. BPH without urinary obstruction  Stay hydrated.  Continue surveillance.  - PROSTATE SPECIFIC AG DIAGNOSTIC; Future    7. Vitamin D deficiency     - VITAMIN D,25 HYDROXY (DEFICIENCY); Future    8. Vitamin B 12 deficiency    Good control continue current regimen-   - cyanocobalamin (VITAMIN B12) 1000 MCG Tab; Take 1 Tablet by mouth every day.  Dispense: 100 Tablet; Refill: 4    9. Primary insomnia   Good control continue current regimen-   - traZODone (DESYREL) 50 MG Tab; TAKE 1 TABLET BY MOUTH EVERY NIGHT AT BEDTIME  Dispense: 90 Tablet; Refill: 4

## 2023-07-11 ENCOUNTER — DOCUMENTATION (OUTPATIENT)
Dept: HEALTH INFORMATION MANAGEMENT | Facility: OTHER | Age: 73
End: 2023-07-11
Payer: MEDICARE

## 2023-08-01 ENCOUNTER — NON-PROVIDER VISIT (OUTPATIENT)
Dept: MEDICAL GROUP | Age: 73
End: 2023-08-01
Payer: MEDICARE

## 2023-08-01 PROCEDURE — 96372 THER/PROPH/DIAG INJ SC/IM: CPT | Performed by: INTERNAL MEDICINE

## 2023-08-01 RX ORDER — TRIAMCINOLONE ACETONIDE 40 MG/ML
40 INJECTION, SUSPENSION INTRA-ARTICULAR; INTRAMUSCULAR ONCE
Status: COMPLETED | OUTPATIENT
Start: 2023-08-01 | End: 2023-08-01

## 2023-08-01 RX ADMIN — TRIAMCINOLONE ACETONIDE 40 MG: 40 INJECTION, SUSPENSION INTRA-ARTICULAR; INTRAMUSCULAR at 10:11

## 2023-08-01 NOTE — PROGRESS NOTES
Mauricio Obando is a 73 y.o. male here for a non-provider visit for Kenalog  injection.    Reason for injection: allergies   Order in MAR?: Yes  Patient supplied?:No  Minimum interval has been met for this injection (per MAR order): Yes    Patient tolerated injection and no adverse effects were observed or reported: Yes    # of Administrations remaining in MAR: 0

## 2023-10-03 ENCOUNTER — APPOINTMENT (OUTPATIENT)
Dept: MEDICAL GROUP | Age: 73
End: 2023-10-03
Payer: MEDICARE

## 2023-10-09 ENCOUNTER — NON-PROVIDER VISIT (OUTPATIENT)
Dept: MEDICAL GROUP | Age: 73
End: 2023-10-09
Payer: MEDICARE

## 2023-10-09 DIAGNOSIS — J30.2 SEASONAL ALLERGIC RHINITIS, UNSPECIFIED TRIGGER: ICD-10-CM

## 2023-10-09 RX ORDER — TRIAMCINOLONE ACETONIDE 40 MG/ML
40 INJECTION, SUSPENSION INTRA-ARTICULAR; INTRAMUSCULAR ONCE
Status: COMPLETED | OUTPATIENT
Start: 2023-10-09 | End: 2023-10-09

## 2023-10-09 RX ADMIN — TRIAMCINOLONE ACETONIDE 40 MG: 40 INJECTION, SUSPENSION INTRA-ARTICULAR; INTRAMUSCULAR at 07:58

## 2023-11-10 LAB
25(OH)D3+25(OH)D2 SERPL-MCNC: 43.4 NG/ML (ref 30–100)
ALBUMIN SERPL-MCNC: 4.3 G/DL (ref 3.8–4.8)
ALBUMIN/GLOB SERPL: 1.5 {RATIO} (ref 1.2–2.2)
ALP SERPL-CCNC: 74 IU/L (ref 44–121)
ALT SERPL-CCNC: 19 IU/L (ref 0–44)
AST SERPL-CCNC: 21 IU/L (ref 0–40)
BASOPHILS # BLD AUTO: 0.1 X10E3/UL (ref 0–0.2)
BASOPHILS NFR BLD AUTO: 1 %
BILIRUB SERPL-MCNC: 0.7 MG/DL (ref 0–1.2)
BUN SERPL-MCNC: 6 MG/DL (ref 8–27)
BUN/CREAT SERPL: 8 (ref 10–24)
CALCIUM SERPL-MCNC: 9.3 MG/DL (ref 8.6–10.2)
CHLORIDE SERPL-SCNC: 94 MMOL/L (ref 96–106)
CHOLEST SERPL-MCNC: 146 MG/DL (ref 100–199)
CO2 SERPL-SCNC: 22 MMOL/L (ref 20–29)
CREAT SERPL-MCNC: 0.74 MG/DL (ref 0.76–1.27)
EGFRCR SERPLBLD CKD-EPI 2021: 97 ML/MIN/1.73
EOSINOPHIL # BLD AUTO: 0.2 X10E3/UL (ref 0–0.4)
EOSINOPHIL NFR BLD AUTO: 2 %
ERYTHROCYTE [DISTWIDTH] IN BLOOD BY AUTOMATED COUNT: 12.9 % (ref 11.6–15.4)
GLOBULIN SER CALC-MCNC: 2.9 G/DL (ref 1.5–4.5)
GLUCOSE SERPL-MCNC: 96 MG/DL (ref 70–99)
HBA1C MFR BLD: 5.5 % (ref 4.8–5.6)
HCT VFR BLD AUTO: 44.2 % (ref 37.5–51)
HDLC SERPL-MCNC: 54 MG/DL
HGB BLD-MCNC: 15.7 G/DL (ref 13–17.7)
IMM GRANULOCYTES # BLD AUTO: 0.1 X10E3/UL (ref 0–0.1)
IMM GRANULOCYTES NFR BLD AUTO: 2 %
IMMATURE CELLS  115398: ABNORMAL
LABORATORY COMMENT REPORT: NORMAL
LDLC SERPL CALC-MCNC: 77 MG/DL (ref 0–99)
LYMPHOCYTES # BLD AUTO: 2.2 X10E3/UL (ref 0.7–3.1)
LYMPHOCYTES NFR BLD AUTO: 33 %
MCH RBC QN AUTO: 34.3 PG (ref 26.6–33)
MCHC RBC AUTO-ENTMCNC: 35.5 G/DL (ref 31.5–35.7)
MCV RBC AUTO: 97 FL (ref 79–97)
MONOCYTES # BLD AUTO: 0.9 X10E3/UL (ref 0.1–0.9)
MONOCYTES NFR BLD AUTO: 14 %
MORPHOLOGY BLD-IMP: ABNORMAL
NEUTROPHILS # BLD AUTO: 3.2 X10E3/UL (ref 1.4–7)
NEUTROPHILS NFR BLD AUTO: 48 %
NRBC BLD AUTO-RTO: ABNORMAL %
PLATELET # BLD AUTO: 445 X10E3/UL (ref 150–450)
POTASSIUM SERPL-SCNC: 4.8 MMOL/L (ref 3.5–5.2)
PROT SERPL-MCNC: 7.2 G/DL (ref 6–8.5)
PSA SERPL-MCNC: 0.6 NG/ML (ref 0–4)
RBC # BLD AUTO: 4.58 X10E6/UL (ref 4.14–5.8)
SODIUM SERPL-SCNC: 129 MMOL/L (ref 134–144)
TRIGL SERPL-MCNC: 77 MG/DL (ref 0–149)
TSH SERPL DL<=0.005 MIU/L-ACNC: 1.67 UIU/ML (ref 0.45–4.5)
VLDLC SERPL CALC-MCNC: 15 MG/DL (ref 5–40)
WBC # BLD AUTO: 6.6 X10E3/UL (ref 3.4–10.8)

## 2023-11-16 ENCOUNTER — OFFICE VISIT (OUTPATIENT)
Dept: MEDICAL GROUP | Age: 73
End: 2023-11-16
Payer: MEDICARE

## 2023-11-16 VITALS
HEART RATE: 89 BPM | WEIGHT: 164 LBS | OXYGEN SATURATION: 97 % | DIASTOLIC BLOOD PRESSURE: 64 MMHG | BODY MASS INDEX: 23.48 KG/M2 | SYSTOLIC BLOOD PRESSURE: 122 MMHG | TEMPERATURE: 97 F | HEIGHT: 70 IN

## 2023-11-16 DIAGNOSIS — J32.9 RECURRENT SINUS INFECTIONS: ICD-10-CM

## 2023-11-16 DIAGNOSIS — R73.01 IFG (IMPAIRED FASTING GLUCOSE): ICD-10-CM

## 2023-11-16 DIAGNOSIS — E78.2 MIXED HYPERLIPIDEMIA: ICD-10-CM

## 2023-11-16 DIAGNOSIS — I10 ESSENTIAL HYPERTENSION: ICD-10-CM

## 2023-11-16 DIAGNOSIS — N40.0 BPH WITHOUT URINARY OBSTRUCTION: ICD-10-CM

## 2023-11-16 DIAGNOSIS — F51.01 PRIMARY INSOMNIA: ICD-10-CM

## 2023-11-16 DIAGNOSIS — E55.9 VITAMIN D DEFICIENCY: ICD-10-CM

## 2023-11-16 DIAGNOSIS — Z00.00 MEDICARE ANNUAL WELLNESS VISIT, SUBSEQUENT: ICD-10-CM

## 2023-11-16 PROCEDURE — G0439 PPPS, SUBSEQ VISIT: HCPCS | Performed by: INTERNAL MEDICINE

## 2023-11-16 PROCEDURE — 3078F DIAST BP <80 MM HG: CPT | Performed by: INTERNAL MEDICINE

## 2023-11-16 PROCEDURE — 3074F SYST BP LT 130 MM HG: CPT | Performed by: INTERNAL MEDICINE

## 2023-11-16 RX ORDER — LISINOPRIL 20 MG/1
20 TABLET ORAL DAILY
Qty: 90 TABLET | Refills: 4 | Status: SHIPPED | OUTPATIENT
Start: 2023-11-16

## 2023-11-16 RX ORDER — LOVASTATIN 40 MG/1
40 TABLET ORAL DAILY
Qty: 90 TABLET | Refills: 4 | Status: SHIPPED | OUTPATIENT
Start: 2023-11-16

## 2023-11-16 RX ORDER — TRAZODONE HYDROCHLORIDE 50 MG/1
TABLET ORAL
Qty: 90 TABLET | Refills: 4 | Status: SHIPPED | OUTPATIENT
Start: 2023-11-16

## 2023-11-16 RX ORDER — CEPHALEXIN 500 MG/1
500 CAPSULE ORAL 4 TIMES DAILY PRN
Qty: 90 CAPSULE | Refills: 1 | Status: SHIPPED | OUTPATIENT
Start: 2023-11-16

## 2023-11-16 ASSESSMENT — ACTIVITIES OF DAILY LIVING (ADL): BATHING_REQUIRES_ASSISTANCE: 0

## 2023-11-16 ASSESSMENT — ENCOUNTER SYMPTOMS: GENERAL WELL-BEING: GOOD

## 2023-11-16 ASSESSMENT — FIBROSIS 4 INDEX: FIB4 SCORE: 0.77

## 2023-11-16 ASSESSMENT — PATIENT HEALTH QUESTIONNAIRE - PHQ9: CLINICAL INTERPRETATION OF PHQ2 SCORE: 0

## 2023-11-16 NOTE — PROGRESS NOTES
Chief Complaint   Patient presents with    Annual Exam     AWV labs        HPI:  Mauricio Obando is a 73 y.o. here for Medicare Annual Wellness Visit   And follow-up of multiple medical problems as below.  No new complaints.  Patient Active Problem List    Diagnosis Date Noted    Seasonal allergic rhinitis due to pollen 04/12/2021    Essential hypertension  03/22/2018    Vitamin B 12 deficiency 08/12/2016    Anxiety 08/02/2013    Mixed hyperlipidemia 05/01/2012    Primary insomnia 05/01/2012       Current Outpatient Medications   Medication Sig Dispense Refill    lisinopril (PRINIVIL) 20 MG Tab Take 1 Tablet by mouth every day. 90 Tablet 4    lovastatin (MEVACOR) 40 MG tablet Take 1 Tablet by mouth every day. 90 Tablet 4    cyanocobalamin (VITAMIN B12) 1000 MCG Tab Take 1 Tablet by mouth every day. 100 Tablet 4    fluticasone (FLONASE) 50 MCG/ACT nasal spray Administer 2 Sprays into affected nostril(S) 2 times a day. 18.2 mL 5    traZODone (DESYREL) 50 MG Tab TAKE 1 TABLET BY MOUTH EVERY NIGHT AT BEDTIME 90 Tablet 4     No current facility-administered medications for this visit.          Current supplements as per medication list.     Allergies: Patient has no known allergies.    Current social contact/activities: yes     He  reports that he has never smoked. He has never used smokeless tobacco. He reports current alcohol use of about 9.0 oz of alcohol per week. He reports that he does not use drugs.  Counseling given: Not Answered      ROS:    Gait: Uses no assistive device  Ostomy: No  Other tubes: No  Amputations: No  Chronic oxygen use: No  Last eye exam: 10/2023  Wears hearing aids: No   : Denies any urinary leakage during the last 6 months    Screening:    Depression Screening  Little interest or pleasure in doing things?  0 - not at all  Feeling down, depressed , or hopeless? 0 - not at all  Patient Health Questionnaire Score: 0     If depressive symptoms identified deferred to follow up visit unless  specifically addressed in assessment and plan.    Interpretation of PHQ-9 Total Score   Score Severity   1-4 No Depression   5-9 Mild Depression   10-14 Moderate Depression   15-19 Moderately Severe Depression   20-27 Severe Depression    Screening for Cognitive Impairment  Do you or any of your friends or family members have any concern about your memory? No  Three Minute Recall (Banana, Sunrise, Chair) 3/3    Gregg clock face with all 12 numbers and set the hands to show 20 past 8.  Yes    Cognitive concerns identified deferred for follow up unless specifically addressed in assessment and plan.    Fall Risk Assessment  Has the patient had two or more falls in the last year or any fall with injury in the last year?  No    Safety Assessment  Do you always wear your seatbelt?  Yes  Any changes to home needed to function safely? No  Difficulty hearing.  No  Patient counseled about all safety risks that were identified.    Functional Assessment ADLs  Are there any barriers preventing you from cooking for yourself or meeting nutritional needs?  No.    Are there any barriers preventing you from driving safely or obtaining transportation?  No.    Are there any barriers preventing you from using a telephone or calling for help?  No    Are there any barriers preventing you from shopping?  No.    Are there any barriers preventing you from taking care of your own finances?  No    Are there any barriers preventing you from managing your medications?  No    Are there any barriers preventing you from showering, bathing or dressing yourself? No    Are there any barriers preventing you from doing housework or laundry? No  Are there any barriers preventing you from using the toilet?No  Are you currently engaging in any exercise or physical activity?  Yes.      Self-Assessment of Health  What is your perception of your health? Good  Do you sleep more than six hours a night? No  In the past 7 days, how much did pain keep you from  doing your normal work? None  Do you spend quality time with family or friends (virtually or in person)? Yes  Do you usually eat a heart healthy diet that constists of a variety of fruits, vegetables, whole grains and fiber? No  Do you eat foods high in fat and/or Fast Food more than three times per week? No    Advance Care Planning  Do you have an Advance Directive, Living Will, Durable Power of , or POLST? Yes    Living Will            Health Maintenance Summary            Overdue - Annual Wellness Visit (Every 366 Days) Overdue - never done      No completion history exists for this topic.              Overdue - Zoster (Shingles) Vaccines (3 of 3) Overdue since 7/8/2021 05/13/2021  Imm Admin: Zoster Vaccine Recombinant (RZV) (SHINGRIX)    09/29/2017  Imm Admin: Zoster Vaccine Live (ZVL) (Zostavax) - HISTORICAL DATA              COVID-19 Vaccine (4 - Moderna series) Next due on 2/13/2024      10/13/2023  Outside Immunization: COVID-19(PFR) 12yrs \T\ up    11/08/2022  Imm Admin: PFIZER BIVALENT SARS-COV-2 VACCINE (12+)    04/09/2021  Imm Admin: MODERNA SARS-COV-2 VACCINE (12+)    03/12/2021  Imm Admin: MODERNA SARS-COV-2 VACCINE (12+)              Colorectal Cancer Screening (Colonoscopy - Every 10 Years) Tentatively due on 4/21/2025 04/21/2015  AMB REFERRAL TO GI FOR COLONOSCOPY    04/25/2012  OCCULT BLOOD,FECAL,IMMUNOASSAY              IMM DTaP/Tdap/Td Vaccine (2 - Td or Tdap) Next due on 2/12/2026 02/12/2016  Imm Admin: Tdap Vaccine              Hepatitis C Screening  Tentatively Complete      08/04/2016  Hepatitis C Antibody component of HEPATITIS PANEL ACUTE (4 COMPONENTS)              Pneumococcal Vaccine: 65+ Years (Series Information) Completed      10/31/2019  Imm Admin: Pneumococcal polysaccharide vaccine (PPSV-23)    09/22/2017  Imm Admin: Pneumococcal Conjugate Vaccine (Prevnar/PCV-13)    02/01/2011  Imm Admin: Pneumococcal polysaccharide vaccine (PPSV-23)              Influenza  "Vaccine (Series Information) Completed      10/13/2023  Outside Immunization: Influenza Quad Adjuvanted    10/19/2022  Imm Admin: Influenza Vaccine Adult HD    09/15/2021  Imm Admin: Influenza Vaccine Adult HD    10/07/2020  Imm Admin: Influenza Vaccine Quad Inj (Pf)    09/24/2019  Imm Admin: Influenza Vaccine Adult HD    Only the first 5 history entries have been loaded, but more history exists.              Hepatitis A Vaccine (Hep A) (Series Information) Aged Out      No completion history exists for this topic.              Hepatitis B Vaccine (Hep B) (Series Information) Aged Out      No completion history exists for this topic.              HPV Vaccines (Series Information) Aged Out      No completion history exists for this topic.              Polio Vaccine (Inactivated Polio) (Series Information) Aged Out      No completion history exists for this topic.              Meningococcal Immunization (Series Information) Aged Out      No completion history exists for this topic.                    Patient Care Team:  Javon Palacios M.D. as PCP - General (Internal Medicine)  Javon Palacios M.D.        Social History     Tobacco Use    Smoking status: Never    Smokeless tobacco: Never   Vaping Use    Vaping Use: Never used   Substance Use Topics    Alcohol use: Yes     Alcohol/week: 9.0 oz     Types: 18 Cans of beer per week     Comment: occ    Drug use: No     Family History   Problem Relation Age of Onset    No Known Problems Mother     No Known Problems Father     No Known Problems Paternal Grandmother     No Known Problems Paternal Grandfather      He  has a past medical history of Anxiety, Hyperlipidemia, and Insomnia.   Past Surgical History:   Procedure Laterality Date    SUBMANDIBLE GLAND EXCISION  1987       Exam:   /64 (BP Location: Right arm, Patient Position: Sitting, BP Cuff Size: Adult)   Pulse 89   Temp 36.1 °C (97 °F) (Temporal)   Ht 1.778 m (5' 10\")   Wt 74.4 kg (164 lb)   SpO2 97%  " Body mass index is 23.53 kg/m².  HEENT clear.  Neck and thyroid normal.  Lungs clear.  Heart regular in rhythm without murmur gallop or jugular venous distention.  Abdomen benign without HSM or masses.  Bowel sounds are normal.  Extremities without sinus clubbing or edema neurological mental status normal  Hearing good.    Dentition good  Alert, oriented in no acute distress.  Eye contact is good, speech goal directed, affect calm    Assessment and Plan. The following treatment and monitoring plan is recommended:     1. Medicare annual wellness visit, subsequent  All metrics updated and reviewed.  Exam unremarkable.  Vaccination status reviewed and recommendations given.    2. Primary insomnia  Good control continue current regimen  - traZODone (DESYREL) 50 MG Tab; TAKE 1 TABLET BY MOUTH EVERY NIGHT AT BEDTIME  Dispense: 90 Tablet; Refill: 4    3. Mixed hyperlipidemiaGood control continue current regimen     - Comp Metabolic Panel; Future  - CBC WITH DIFFERENTIAL; Future  - TSH; Future  - Lipid Profile; Future  - lovastatin (MEVACOR) 40 MG tablet; Take 1 Tablet by mouth every day.  Dispense: 90 Tablet; Refill: 4    4. Essential hypertension Good control continue current regimen     - lisinopril (PRINIVIL) 20 MG Tab; Take 1 Tablet by mouth every day.  Dispense: 90 Tablet; Refill: 4    5. Vitamin D deficiency    Good control continue current regimen  - VITAMIN D,25 HYDROXY (DEFICIENCY); Future    6. BPH without urinary obstruction   Good control continue current regimen  - PROSTATE SPECIFIC AG DIAGNOSTIC; Future    7. IFG (impaired fasting glucose)   Good control continue current regimen.  Mediterranean diet and exercise  - HEMOGLOBIN A1C; Future    8. Recurrent sinus infections    Good control continue current regimen.  Continue as needed antibiotics for flareups.  - cephALEXin (KEFLEX) 500 MG Cap; Take 1 Capsule by mouth 4 times a day as needed (recurrent sinus infections).  Dispense: 90 Capsule; Refill: 1        Services suggested: No services needed at this time  Health Care Screening: Age-appropriate preventive services recommended by USPTF and ACIP covered by Medicare were discussed today. Services ordered if indicated and agreed upon by the patient.  Referrals offered: Community-based lifestyle interventions to reduce health risks and promote self-management and wellness, fall prevention, nutrition, physical activity, tobacco-use cessation, weight loss, and mental health services as per orders if indicated.    Discussion today about general wellness and lifestyle habits:    Prevent falls and reduce trip hazards; Cautioned about securing or removing rugs.  Have a working fire alarm and carbon monoxide detector;   Engage in regular physical activity and social activities     Follow-up: No follow-ups on file.

## 2024-02-08 ENCOUNTER — NON-PROVIDER VISIT (OUTPATIENT)
Dept: MEDICAL GROUP | Age: 74
End: 2024-02-08
Payer: MEDICARE

## 2024-02-08 DIAGNOSIS — J30.2 SEASONAL ALLERGIC RHINITIS, UNSPECIFIED TRIGGER: ICD-10-CM

## 2024-02-08 RX ORDER — TRIAMCINOLONE ACETONIDE 40 MG/ML
40 INJECTION, SUSPENSION INTRA-ARTICULAR; INTRAMUSCULAR ONCE
Status: COMPLETED | OUTPATIENT
Start: 2024-02-08 | End: 2024-02-08

## 2024-02-08 RX ADMIN — TRIAMCINOLONE ACETONIDE 40 MG: 40 INJECTION, SUSPENSION INTRA-ARTICULAR; INTRAMUSCULAR at 10:10

## 2024-02-08 NOTE — PROGRESS NOTES
Mauricio Obando is a 73 y.o. male here for a non-provider visit for kenalog injection.    Reason for injection: allergies   Order in MAR?: No  Patient supplied?:No  Minimum interval has been met for this injection (per MAR order): Yes    Patient tolerated injection and no adverse effects were observed or reported: Yes    # of Administrations remaining in MAR: 0

## 2024-04-05 ENCOUNTER — OFFICE VISIT (OUTPATIENT)
Dept: MEDICAL GROUP | Age: 74
End: 2024-04-05
Payer: MEDICARE

## 2024-04-05 VITALS
OXYGEN SATURATION: 94 % | TEMPERATURE: 97.2 F | HEART RATE: 94 BPM | SYSTOLIC BLOOD PRESSURE: 130 MMHG | DIASTOLIC BLOOD PRESSURE: 70 MMHG | BODY MASS INDEX: 23.77 KG/M2 | HEIGHT: 70 IN | WEIGHT: 166 LBS

## 2024-04-05 DIAGNOSIS — J30.2 SEASONAL ALLERGIC RHINITIS, UNSPECIFIED TRIGGER: ICD-10-CM

## 2024-04-05 PROCEDURE — 99214 OFFICE O/P EST MOD 30 MIN: CPT | Performed by: FAMILY MEDICINE

## 2024-04-05 PROCEDURE — 3078F DIAST BP <80 MM HG: CPT | Performed by: FAMILY MEDICINE

## 2024-04-05 PROCEDURE — 3075F SYST BP GE 130 - 139MM HG: CPT | Performed by: FAMILY MEDICINE

## 2024-04-05 RX ORDER — FLUTICASONE PROPIONATE 50 MCG
2 SPRAY, SUSPENSION (ML) NASAL 2 TIMES DAILY
Qty: 18.2 ML | Refills: 5 | Status: SHIPPED | OUTPATIENT
Start: 2024-04-05

## 2024-04-05 RX ORDER — PREDNISONE 20 MG/1
TABLET ORAL
Qty: 12 TABLET | Refills: 0 | Status: SHIPPED | OUTPATIENT
Start: 2024-04-05

## 2024-04-05 RX ORDER — MONTELUKAST SODIUM 10 MG/1
10 TABLET ORAL DAILY
Qty: 90 TABLET | Refills: 0 | Status: SHIPPED | OUTPATIENT
Start: 2024-04-05

## 2024-04-05 RX ORDER — KETOCONAZOLE 20 MG/ML
SHAMPOO TOPICAL
COMMUNITY
Start: 2024-01-22

## 2024-04-05 RX ORDER — FLUOCINOLONE ACETONIDE 0.25 MG/G
OINTMENT TOPICAL
COMMUNITY
Start: 2024-01-22

## 2024-04-05 RX ORDER — TRIAMCINOLONE ACETONIDE 40 MG/ML
40 INJECTION, SUSPENSION INTRA-ARTICULAR; INTRAMUSCULAR ONCE
Status: COMPLETED | OUTPATIENT
Start: 2024-04-05 | End: 2024-04-05

## 2024-04-05 RX ADMIN — TRIAMCINOLONE ACETONIDE 40 MG: 40 INJECTION, SUSPENSION INTRA-ARTICULAR; INTRAMUSCULAR at 14:01

## 2024-04-05 ASSESSMENT — PATIENT HEALTH QUESTIONNAIRE - PHQ9: CLINICAL INTERPRETATION OF PHQ2 SCORE: 0

## 2024-04-05 ASSESSMENT — FIBROSIS 4 INDEX: FIB4 SCORE: 0.77

## 2024-04-05 NOTE — PROGRESS NOTES
This medical record contains text that has been entered with the assistance of computer voice recognition and dictation software.  Therefore, it may contain unintended errors in text, spelling, punctuation, or grammar      Chief Complaint   Patient presents with    Follow-Up     John ferguson         Mauricio Obando is a 73 y.o. male here evaluation and management of: seasonal allergies      HPI:           1. Seasonal allergic rhinitis, unspecified trigger    History of Present Illness  The patient presents for evaluation of chronic sinus problems.    The patient's primary concern pertains to his chronic sinus issues, characterized by persistent swelling in the sinuses that persists throughout the day. He occasionally resorts to Sinex nasal spray, which provides some relief. Upon awakening, he experiences bilateral nasal obstruction, a symptom that persists daily. He recalls an episode of upper respiratory infection and sinus infection in 01/2024, which necessitated a week-long course of antibiotics at an urgent care facility in [place]. Although this treatment provided some relief, he remains uncertain about its efficacy. His olfactory and gustatory senses remain unaffected.      - Triamcinolone Acetonide    Current medicines (including changes today)  Current Outpatient Medications   Medication Sig Dispense Refill    ketoconazole (NIZORAL) 2 % shampoo Apply  topically 1 time a day as needed.      fluocinolone (SYNALAR) 0.025 % ointment       fluticasone (FLONASE) 50 MCG/ACT nasal spray Administer 2 Sprays into affected nostril(S) 2 times a day. 18.2 mL 5    montelukast (SINGULAIR) 10 MG Tab Take 1 Tablet by mouth every day. 90 Tablet 0    predniSONE (DELTASONE) 20 MG Tab Take 3 tabs po for 2 days then 2 tabs for 2 days then 1 for 2 days 12 Tablet 0    lisinopril (PRINIVIL) 20 MG Tab Take 1 Tablet by mouth every day. 90 Tablet 4    lovastatin (MEVACOR) 40 MG tablet Take 1 Tablet by mouth every day. 90 Tablet 4     "traZODone (DESYREL) 50 MG Tab TAKE 1 TABLET BY MOUTH EVERY NIGHT AT BEDTIME 90 Tablet 4    cephALEXin (KEFLEX) 500 MG Cap Take 1 Capsule by mouth 4 times a day as needed (recurrent sinus infections). 90 Capsule 1    cyanocobalamin (VITAMIN B12) 1000 MCG Tab Take 1 Tablet by mouth every day. 100 Tablet 4     Current Facility-Administered Medications   Medication Dose Route Frequency Provider Last Rate Last Admin    triamcinolone acetonide (Kenalog-40) injection 40 mg  40 mg Intramuscular Once Art Villegas M.D.         He  has a past medical history of Anxiety, Hyperlipidemia, and Insomnia.  He  has a past surgical history that includes submandible gland excision ().  Social History     Tobacco Use    Smoking status: Never    Smokeless tobacco: Never   Vaping Use    Vaping Use: Never used   Substance Use Topics    Alcohol use: Yes     Alcohol/week: 9.0 oz     Types: 18 Cans of beer per week     Comment: occ    Drug use: No     Social History     Social History Narrative    ** Merged History Encounter **          Family History   Problem Relation Age of Onset    No Known Problems Mother     No Known Problems Father     No Known Problems Paternal Grandmother     No Known Problems Paternal Grandfather      Family Status   Relation Name Status    Mo  Alive    Fa      MGMo      MGFa      PGMo      PGFa           ROS    The pertinent  ROS findings can be seen in the HPI above.     All other systems reviewed and are negative     Objective:     /70 (BP Location: Right arm, Patient Position: Sitting, BP Cuff Size: Adult)   Pulse 94   Temp 36.2 °C (97.2 °F) (Temporal)   Ht 1.778 m (5' 10\")   Wt 75.3 kg (166 lb)   SpO2 94%  Body mass index is 23.82 kg/m².      Physical Exam:    Constitutional: Alert, no distress.  Skin: No suspicious lesions  Eye: Equal, round and reactive, conjunctiva clear, lids normal.  ENMT: Lips without lesions, good dentition, oropharynx " clear.  Neck: Trachea midline, no masses, no thyromegaly. No cervical or supraclavicular lymphadenopathy.  Respiratory: Unlabored respiratory effort, lungs clear to auscultation, no wheezes, no ronchi.  Cardiovascular: Normal S1, S2, no murmur, no edema  Abdomen: Soft, non-tender, no masses, no hepatosplenomegaly.        Assessment and Plan:   The following treatment plan was discussed    All recent labs and provider notes reviewed    1. Seasonal allergic rhinitis, unspecified trigger    I recommended nasal saline rinsing prior to using Flonase  Add Singulair.  Also a steroid taper  If this fails we will proceed with an ENT referral    - fluticasone (FLONASE) 50 MCG/ACT nasal spray; Administer 2 Sprays into affected nostril(S) 2 times a day.  Dispense: 18.2 mL; Refill: 5  - montelukast (SINGULAIR) 10 MG Tab; Take 1 Tablet by mouth every day.  Dispense: 90 Tablet; Refill: 0  - predniSONE (DELTASONE) 20 MG Tab; Take 3 tabs po for 2 days then 2 tabs for 2 days then 1 for 2 days  Dispense: 12 Tablet; Refill: 0  - triamcinolone acetonide (Kenalog-40) injection 40 mg    Other orders  - ketoconazole (NIZORAL) 2 % shampoo; Apply  topically 1 time a day as needed.  - fluocinolone (SYNALAR) 0.025 % ointment             Instructed to Follow up in clinic or ER for worsening symptoms, difficulty breathing, lack of expected recovery, or should new symptoms or problems arise.    Followup: Return in about 3 months (around 7/5/2024) for Reevaluation, labs.

## 2024-05-16 DIAGNOSIS — E78.2 MIXED HYPERLIPIDEMIA: ICD-10-CM

## 2024-05-16 RX ORDER — LOVASTATIN 40 MG/1
40 TABLET ORAL DAILY
Qty: 90 TABLET | Refills: 3 | Status: SHIPPED | OUTPATIENT
Start: 2024-05-16 | End: 2024-05-21 | Stop reason: SDUPTHER

## 2024-05-17 LAB
25(OH)D3+25(OH)D2 SERPL-MCNC: 42.4 NG/ML (ref 30–100)
ALBUMIN SERPL-MCNC: 4.4 G/DL (ref 3.8–4.8)
ALBUMIN/GLOB SERPL: 1.7 {RATIO} (ref 1.2–2.2)
ALP SERPL-CCNC: 83 IU/L (ref 44–121)
ALT SERPL-CCNC: 21 IU/L (ref 0–44)
AST SERPL-CCNC: 30 IU/L (ref 0–40)
BASOPHILS # BLD AUTO: 0.1 X10E3/UL (ref 0–0.2)
BASOPHILS NFR BLD AUTO: 1 %
BILIRUB SERPL-MCNC: 0.7 MG/DL (ref 0–1.2)
BUN SERPL-MCNC: 8 MG/DL (ref 8–27)
BUN/CREAT SERPL: 10 (ref 10–24)
CALCIUM SERPL-MCNC: 9.3 MG/DL (ref 8.6–10.2)
CHLORIDE SERPL-SCNC: 92 MMOL/L (ref 96–106)
CHOLEST SERPL-MCNC: 180 MG/DL (ref 100–199)
CO2 SERPL-SCNC: 21 MMOL/L (ref 20–29)
COMPLEXED PSA SERPL-MCNC: 0.4 NG/ML (ref 0–3.6)
CREAT SERPL-MCNC: 0.8 MG/DL (ref 0.76–1.27)
EGFRCR SERPLBLD CKD-EPI 2021: 95 ML/MIN/1.73
EOSINOPHIL # BLD AUTO: 0.1 X10E3/UL (ref 0–0.4)
EOSINOPHIL NFR BLD AUTO: 2 %
ERYTHROCYTE [DISTWIDTH] IN BLOOD BY AUTOMATED COUNT: 12.8 % (ref 11.6–15.4)
GLOBULIN SER CALC-MCNC: 2.6 G/DL (ref 1.5–4.5)
GLUCOSE SERPL-MCNC: 89 MG/DL (ref 70–99)
HBA1C MFR BLD: 5.6 % (ref 4.8–5.6)
HCT VFR BLD AUTO: 42.8 % (ref 37.5–51)
HDLC SERPL-MCNC: 76 MG/DL
HGB BLD-MCNC: 15.6 G/DL (ref 13–17.7)
IMM GRANULOCYTES # BLD AUTO: 0.1 X10E3/UL (ref 0–0.1)
IMM GRANULOCYTES NFR BLD AUTO: 2 %
IMMATURE CELLS  115398: ABNORMAL
LABORATORY COMMENT REPORT: NORMAL
LDLC SERPL CALC-MCNC: 91 MG/DL (ref 0–99)
LYMPHOCYTES # BLD AUTO: 1.6 X10E3/UL (ref 0.7–3.1)
LYMPHOCYTES NFR BLD AUTO: 32 %
MCH RBC QN AUTO: 35.8 PG (ref 26.6–33)
MCHC RBC AUTO-ENTMCNC: 36.4 G/DL (ref 31.5–35.7)
MCV RBC AUTO: 98 FL (ref 79–97)
MONOCYTES # BLD AUTO: 0.9 X10E3/UL (ref 0.1–0.9)
MONOCYTES NFR BLD AUTO: 18 %
MORPHOLOGY BLD-IMP: ABNORMAL
NEUTROPHILS # BLD AUTO: 2.2 X10E3/UL (ref 1.4–7)
NEUTROPHILS NFR BLD AUTO: 45 %
NRBC BLD AUTO-RTO: ABNORMAL %
PLATELET # BLD AUTO: 353 X10E3/UL (ref 150–450)
POTASSIUM SERPL-SCNC: 5.3 MMOL/L (ref 3.5–5.2)
PROT SERPL-MCNC: 7 G/DL (ref 6–8.5)
RBC # BLD AUTO: 4.36 X10E6/UL (ref 4.14–5.8)
SODIUM SERPL-SCNC: 126 MMOL/L (ref 134–144)
TRIGL SERPL-MCNC: 71 MG/DL (ref 0–149)
TSH SERPL DL<=0.005 MIU/L-ACNC: 1.42 UIU/ML (ref 0.45–4.5)
VLDLC SERPL CALC-MCNC: 13 MG/DL (ref 5–40)
WBC # BLD AUTO: 5 X10E3/UL (ref 3.4–10.8)

## 2024-05-21 ENCOUNTER — OFFICE VISIT (OUTPATIENT)
Dept: MEDICAL GROUP | Age: 74
End: 2024-05-21
Payer: MEDICARE

## 2024-05-21 VITALS
HEIGHT: 70 IN | DIASTOLIC BLOOD PRESSURE: 60 MMHG | HEART RATE: 91 BPM | OXYGEN SATURATION: 93 % | BODY MASS INDEX: 23.19 KG/M2 | SYSTOLIC BLOOD PRESSURE: 118 MMHG | TEMPERATURE: 97.4 F | WEIGHT: 162 LBS

## 2024-05-21 DIAGNOSIS — Z23 NEED FOR VACCINATION: ICD-10-CM

## 2024-05-21 DIAGNOSIS — J32.9 RECURRENT SINUS INFECTIONS: ICD-10-CM

## 2024-05-21 DIAGNOSIS — E78.2 MIXED HYPERLIPIDEMIA: ICD-10-CM

## 2024-05-21 DIAGNOSIS — F51.01 PRIMARY INSOMNIA: ICD-10-CM

## 2024-05-21 DIAGNOSIS — J30.2 SEASONAL ALLERGIC RHINITIS, UNSPECIFIED TRIGGER: ICD-10-CM

## 2024-05-21 DIAGNOSIS — J30.9 CHRONIC ALLERGIC RHINITIS: ICD-10-CM

## 2024-05-21 DIAGNOSIS — I10 ESSENTIAL HYPERTENSION: ICD-10-CM

## 2024-05-21 DIAGNOSIS — R73.01 IFG (IMPAIRED FASTING GLUCOSE): ICD-10-CM

## 2024-05-21 DIAGNOSIS — E55.9 VITAMIN D DEFICIENCY: ICD-10-CM

## 2024-05-21 DIAGNOSIS — N40.0 BPH WITHOUT URINARY OBSTRUCTION: ICD-10-CM

## 2024-05-21 PROCEDURE — 3078F DIAST BP <80 MM HG: CPT | Performed by: INTERNAL MEDICINE

## 2024-05-21 PROCEDURE — 99214 OFFICE O/P EST MOD 30 MIN: CPT | Performed by: INTERNAL MEDICINE

## 2024-05-21 PROCEDURE — 3074F SYST BP LT 130 MM HG: CPT | Performed by: INTERNAL MEDICINE

## 2024-05-21 RX ORDER — MONTELUKAST SODIUM 10 MG/1
10 TABLET ORAL DAILY
Qty: 90 TABLET | Refills: 0 | Status: SHIPPED | OUTPATIENT
Start: 2024-05-21

## 2024-05-21 RX ORDER — LISINOPRIL 20 MG/1
20 TABLET ORAL DAILY
Qty: 90 TABLET | Refills: 2 | Status: SHIPPED | OUTPATIENT
Start: 2024-05-21

## 2024-05-21 RX ORDER — LOVASTATIN 40 MG/1
40 TABLET ORAL DAILY
Qty: 90 TABLET | Refills: 2 | Status: SHIPPED | OUTPATIENT
Start: 2024-05-21

## 2024-05-21 RX ORDER — TRAZODONE HYDROCHLORIDE 50 MG/1
TABLET ORAL
Qty: 90 TABLET | Refills: 2 | Status: SHIPPED | OUTPATIENT
Start: 2024-05-21

## 2024-05-21 RX ORDER — FLUTICASONE PROPIONATE 50 MCG
2 SPRAY, SUSPENSION (ML) NASAL 2 TIMES DAILY
Qty: 18.2 ML | Refills: 5 | Status: SHIPPED | OUTPATIENT
Start: 2024-05-21

## 2024-05-21 RX ORDER — CEPHALEXIN 500 MG/1
500 CAPSULE ORAL 4 TIMES DAILY PRN
Qty: 90 CAPSULE | Refills: 1 | Status: SHIPPED | OUTPATIENT
Start: 2024-05-21

## 2024-05-21 ASSESSMENT — ENCOUNTER SYMPTOMS
PSYCHIATRIC NEGATIVE: 1
GASTROINTESTINAL NEGATIVE: 1
NEUROLOGICAL NEGATIVE: 1
MUSCULOSKELETAL NEGATIVE: 1
CARDIOVASCULAR NEGATIVE: 1
CONSTITUTIONAL NEGATIVE: 1
EYES NEGATIVE: 1
RESPIRATORY NEGATIVE: 1

## 2024-05-21 ASSESSMENT — FIBROSIS 4 INDEX: FIB4 SCORE: 1.32

## 2024-05-21 NOTE — PROGRESS NOTES
Subjective     Mauricio Obando is a 73 y.o. male who presents with Follow-Up (Lab review DMV form )    The patient is here for follow-up of his acute flareup of his chronic seasonal allergic rhinitis that was quite severe and prolonged this time of less than 2 months.  When seen by Dr. SANTA in our office a month ago he was given a 40 mg Kenalog IM shot and a Medrol Dosepak and Flonase inhaler and montelukast.  Since then he has been doing much better with resolution of his symptoms but is still quite concerned about the recurrence of this and whether or not he has allergies and would like referral to an allergist.    Also patient needed a DMV form documenting his ability to drive which we can sign since he has no restrictions and for general checkup of his multiple medical problems as listed below.  We reviewed his lab which showed a slightly persistent hyponatremia and hyperkalemia but with no dizziness or his difficulty controlling his blood pressure.  He is on lisinopril 20 mg daily for this.  He has not been screened for hyperaldosteronism  Patient Active Problem List   Diagnosis    Mixed hyperlipidemia    Primary insomnia    Anxiety    Vitamin B 12 deficiency    Essential hypertension     Seasonal allergic rhinitis due to pollen     .pro  Outpatient Medications Prior to Visit   Medication Sig Dispense Refill    ketoconazole (NIZORAL) 2 % shampoo Apply  topically 1 time a day as needed.      fluocinolone (SYNALAR) 0.025 % ointment       predniSONE (DELTASONE) 20 MG Tab Take 3 tabs po for 2 days then 2 tabs for 2 days then 1 for 2 days 12 Tablet 0    cyanocobalamin (VITAMIN B12) 1000 MCG Tab Take 1 Tablet by mouth every day. 100 Tablet 4    lovastatin (MEVACOR) 40 MG tablet TAKE 1 TABLET DAILY 90 Tablet 3    fluticasone (FLONASE) 50 MCG/ACT nasal spray Administer 2 Sprays into affected nostril(S) 2 times a day. 18.2 mL 5    montelukast (SINGULAIR) 10 MG Tab Take 1 Tablet by mouth every day. 90 Tablet 0     "lisinopril (PRINIVIL) 20 MG Tab Take 1 Tablet by mouth every day. 90 Tablet 4    traZODone (DESYREL) 50 MG Tab TAKE 1 TABLET BY MOUTH EVERY NIGHT AT BEDTIME 90 Tablet 4    cephALEXin (KEFLEX) 500 MG Cap Take 1 Capsule by mouth 4 times a day as needed (recurrent sinus infections). 90 Capsule 1     No facility-administered medications prior to visit.             HPI    Review of Systems   Constitutional: Negative.    HENT: Negative.     Eyes: Negative.    Respiratory: Negative.     Cardiovascular: Negative.    Gastrointestinal: Negative.    Genitourinary: Negative.    Musculoskeletal: Negative.    Skin: Negative.    Neurological: Negative.    Endo/Heme/Allergies: Negative.    Psychiatric/Behavioral: Negative.                Objective     /60 (BP Location: Right arm, Patient Position: Sitting, BP Cuff Size: Adult)   Pulse 91   Temp 36.3 °C (97.4 °F) (Temporal)   Ht 1.778 m (5' 10\")   Wt 73.5 kg (162 lb)   SpO2 93%   BMI 23.24 kg/m²      Physical Exam  Constitutional:       General: He is not in acute distress.     Appearance: He is well-developed. He is not diaphoretic.   HENT:      Head: Normocephalic and atraumatic.      Right Ear: External ear normal.      Left Ear: External ear normal.      Nose: Nose normal.      Mouth/Throat:      Pharynx: No oropharyngeal exudate.   Eyes:      General: No scleral icterus.        Right eye: No discharge.         Left eye: No discharge.      Conjunctiva/sclera: Conjunctivae normal.      Pupils: Pupils are equal, round, and reactive to light.   Neck:      Thyroid: No thyromegaly.      Vascular: No JVD.      Trachea: No tracheal deviation.   Cardiovascular:      Rate and Rhythm: Normal rate and regular rhythm.      Heart sounds: Normal heart sounds. No murmur heard.     No friction rub. No gallop.   Pulmonary:      Effort: Pulmonary effort is normal. No respiratory distress.      Breath sounds: Normal breath sounds. No stridor. No wheezing or rales.   Chest:      Chest " wall: No tenderness.   Abdominal:      General: Bowel sounds are normal. There is no distension.      Palpations: Abdomen is soft. There is no mass.      Tenderness: There is no abdominal tenderness. There is no guarding or rebound.   Musculoskeletal:         General: No tenderness. Normal range of motion.      Cervical back: Normal range of motion and neck supple.   Lymphadenopathy:      Cervical: No cervical adenopathy.   Skin:     General: Skin is warm and dry.      Coloration: Skin is not pale.      Findings: No erythema or rash.   Neurological:      Mental Status: He is alert and oriented to person, place, and time.      Motor: No abnormal muscle tone.      Coordination: Coordination normal.      Deep Tendon Reflexes: Reflexes are normal and symmetric. Reflexes normal.   Psychiatric:         Behavior: Behavior normal.         Thought Content: Thought content normal.         Judgment: Judgment normal.       No visits with results within 1 Month(s) from this visit.   Latest known visit with results is:   Orders Only on 11/09/2023   Component Date Value    WBC 11/09/2023 6.6     RBC 11/09/2023 4.58     Hemoglobin 11/09/2023 15.7     Hematocrit 11/09/2023 44.2     MCV 11/09/2023 97     MCH 11/09/2023 34.3 (H)     MCHC 11/09/2023 35.5     RDW 11/09/2023 12.9     Platelet Count 11/09/2023 445     Neutrophils-Polys 11/09/2023 48     Lymphocytes 11/09/2023 33     Monocytes 11/09/2023 14     Eosinophils 11/09/2023 2     Basophils 11/09/2023 1     Immature Cells 11/09/2023 CANCELED     Neutrophils (Absolute) 11/09/2023 3.2     Lymphs (Absolute) 11/09/2023 2.2     Monos (Absolute) 11/09/2023 0.9     Eos (Absolute) 11/09/2023 0.2     Baso (Absolute) 11/09/2023 0.1     Immature Granulocytes 11/09/2023 2     Immature Granulocytes (a* 11/09/2023 0.1     Nucleated RBC 11/09/2023 CANCELED     Comments-Diff 11/09/2023 CANCELED     Glucose 11/09/2023 96     Bun 11/09/2023 6 (L)     Creatinine 11/09/2023 0.74 (L)     eGFR  11/09/2023 97     Bun-Creatinine Ratio 11/09/2023 8 (L)     Sodium 11/09/2023 129 (L)     Potassium 11/09/2023 4.8     Chloride 11/09/2023 94 (L)     Co2 11/09/2023 22     Calcium 11/09/2023 9.3     Total Protein 11/09/2023 7.2     Albumin 11/09/2023 4.3     Globulin 11/09/2023 2.9     A-G Ratio 11/09/2023 1.5     Total Bilirubin 11/09/2023 0.7     Alkaline Phosphatase 11/09/2023 74     AST(SGOT) 11/09/2023 21     ALT(SGPT) 11/09/2023 19     Cholesterol,Tot 11/09/2023 146     Triglycerides 11/09/2023 77     HDL 11/09/2023 54     VLDL Cholesterol Calc 11/09/2023 15     LDL Chol Calc (NIH) 11/09/2023 77     Comment: 11/09/2023 CANCELED     Glycohemoglobin 11/09/2023 5.5     TSH 11/09/2023 1.670     Prostatic Specific Antig* 11/09/2023 0.6     25-Hydroxy   Vitamin D 25 11/09/2023 43.4     WBC 05/14/2024 5.0     RBC 05/14/2024 4.36     Hemoglobin 05/14/2024 15.6     Hematocrit 05/14/2024 42.8     MCV 05/14/2024 98 (H)     MCH 05/14/2024 35.8 (H)     MCHC 05/14/2024 36.4 (H)     RDW 05/14/2024 12.8     Platelet Count 05/14/2024 353     Neutrophils-Polys 05/14/2024 45     Lymphocytes 05/14/2024 32     Monocytes 05/14/2024 18     Eosinophils 05/14/2024 2     Basophils 05/14/2024 1     Immature Cells 05/14/2024 CANCELED     Neutrophils (Absolute) 05/14/2024 2.2     Lymphs (Absolute) 05/14/2024 1.6     Monos (Absolute) 05/14/2024 0.9     Eos (Absolute) 05/14/2024 0.1     Baso (Absolute) 05/14/2024 0.1     Immature Granulocytes 05/14/2024 2     Immature Granulocytes (a* 05/14/2024 0.1     Nucleated RBC 05/14/2024 CANCELED     Comments-Diff 05/14/2024 CANCELED     Glucose 05/14/2024 89     Bun 05/14/2024 8     Creatinine 05/14/2024 0.80     eGFR 05/14/2024 95     Bun-Creatinine Ratio 05/14/2024 10     Sodium 05/14/2024 126 (L)     Potassium 05/14/2024 5.3 (H)     Chloride 05/14/2024 92 (L)     Co2 05/14/2024 21     Calcium 05/14/2024 9.3     Total Protein 05/14/2024 7.0     Albumin 05/14/2024 4.4     Globulin 05/14/2024 2.6      A-G Ratio 05/14/2024 1.7     Total Bilirubin 05/14/2024 0.7     Alkaline Phosphatase 05/14/2024 83     AST(SGOT) 05/14/2024 30     ALT(SGPT) 05/14/2024 21     Cholesterol,Tot 05/14/2024 180     Triglycerides 05/14/2024 71     HDL 05/14/2024 76     VLDL Cholesterol Calc 05/14/2024 13     LDL Chol Calc (NIH) 05/14/2024 91     Comment: 05/14/2024 CANCELED     Glycohemoglobin 05/14/2024 5.6     TSH 05/14/2024 1.420     25-Hydroxy   Vitamin D 25 05/14/2024 42.4     PSA, Complexed 05/14/2024 0.4       Lab Results   Component Value Date/Time    HBA1C 5.6 05/14/2024 05:49 AM    HBA1C 5.5 11/09/2023 05:57 AM     Lab Results   Component Value Date/Time    SODIUM 126 (L) 05/14/2024 05:49 AM    SODIUM 128 (L) 04/28/2017 11:01 AM    POTASSIUM 5.3 (H) 05/14/2024 05:49 AM    POTASSIUM 4.1 04/28/2017 11:01 AM    CHLORIDE 92 (L) 05/14/2024 05:49 AM    CHLORIDE 94 (L) 04/28/2017 11:01 AM    CO2 21 05/14/2024 05:49 AM    CO2 26 04/28/2017 11:01 AM    GLUCOSE 89 05/14/2024 05:49 AM    GLUCOSE 83 04/28/2017 11:01 AM    BUN 8 05/14/2024 05:49 AM    BUN 15 04/28/2017 11:01 AM    CREATININE 0.80 05/14/2024 05:49 AM    CREATININE 0.63 04/28/2017 11:01 AM    CREATININE 0.88 01/24/2013 09:31 AM    BUNCREATRAT 10 05/14/2024 05:49 AM    BUNCREATRAT 11 01/24/2013 09:31 AM    ALKPHOSPHAT 83 05/14/2024 05:49 AM    ALKPHOSPHAT 82 04/27/2017 03:30 AM    ASTSGOT 30 05/14/2024 05:49 AM    ASTSGOT 37 04/27/2017 03:30 AM    ALTSGPT 21 05/14/2024 05:49 AM    ALTSGPT 48 04/27/2017 03:30 AM    TBILIRUBIN 0.7 05/14/2024 05:49 AM    TBILIRUBIN 1.3 04/27/2017 03:30 AM     Lab Results   Component Value Date/Time    INR 0.86 (L) 04/26/2017 12:35 PM     Lab Results   Component Value Date/Time    CHOLSTRLTOT 180 05/14/2024 05:49 AM    CHOLSTRLTOT 199 01/24/2013 09:31 AM    LDL 81 07/08/2020 06:36 AM     (H) 01/24/2013 09:31 AM    HDL 76 05/14/2024 05:49 AM    HDL 57 01/24/2013 09:31 AM    TRIGLYCERIDE 71 05/14/2024 05:49 AM    TRIGLYCERIDE 111  "01/24/2013 09:31 AM       No results found for: \"TESTOSTERONE\"  Lab Results   Component Value Date/Time    TSH 1.420 05/14/2024 05:49 AM    TSH 1.670 11/09/2023 05:57 AM     No results found for: \"FREET4\"  No results found for: \"URICACID\"  No components found for: \"VITB12\"  Lab Results   Component Value Date/Time    25HYDROXY 42.4 05/14/2024 05:49 AM    25HYDROXY 43.4 11/09/2023 05:57 AM                             Assessment & Plan        1. Chronic allergic rhinitis  This is much better and resolved with the above treatment.  Continue on montelukast 10 mg daily and Flonase 2 puffs on each side once a day.  Referral to allergist at his request.  - Referral to Allergy    2. Seasonal allergic rhinitis, unspecified trigger     - fluticasone (FLONASE) 50 MCG/ACT nasal spray; Administer 2 Sprays into affected nostril(S) 2 times a day.  Dispense: 18.2 mL; Refill: 5  - montelukast (SINGULAIR) 10 MG Tab; Take 1 Tablet by mouth every day.  Dispense: 90 Tablet; Refill: 0    3. Recurrent sinus infections  Good control continue current regimen.  Keflex as needed acute sinus infections.  - cephALEXin (KEFLEX) 500 MG Cap; Take 1 Capsule by mouth 4 times a day as needed (recurrent sinus infections).  Dispense: 90 Capsule; Refill: 1    4. Essential hypertension   Good control on current regimen.  Rule out hyper Sander.  - lisinopril (PRINIVIL) 20 MG Tab; Take 1 Tablet by mouth every day.  Dispense: 90 Tablet; Refill: 2  - Comp Metabolic Panel; Future  - CBC WITH DIFFERENTIAL; Future  - TSH; Future  - Lipid Profile; Future  - RENIN ACTIVITY AND ALDOSTERONE    5. Mixed hyperlipidemia  Good control continue current regimen.  - lovastatin (MEVACOR) 40 MG tablet; Take 1 Tablet by mouth every day.  Dispense: 90 Tablet; Refill: 2  - Comp Metabolic Panel; Future  - CBC WITH DIFFERENTIAL; Future  - TSH; Future  - Lipid Profile; Future    6. Primary insomnia  Good control continue current regimen  - traZODone (DESYREL) 50 MG Tab; TAKE 1 " TABLET BY MOUTH EVERY NIGHT AT BEDTIME  Dispense: 90 Tablet; Refill: 2    7. BPH without urinary obstruction  Good control without medication.  Continue surveillance  - PROSTATE SPECIFIC AG DIAGNOSTIC; Future    8. Vitamin D deficiency  Good control continue vitamin D3 2000 units daily.  - VITAMIN D,25 HYDROXY (DEFICIENCY); Future    9. IFG (impaired fasting glucose)     - HEMOGLOBIN A1C; Future    10. Need for vaccination      Will get shingles #2 and RSV vaccine at pharmacy.  - Zoster Vac Recomb Adjuvanted (SHINGRIX) 50 MCG/0.5ML Recon Susp; Inject 0.5 mL into the shoulder, thigh, or buttocks one time for 1 dose.  Dispense: 0.5 mL; Refill: 0

## 2024-07-08 ENCOUNTER — NON-PROVIDER VISIT (OUTPATIENT)
Dept: MEDICAL GROUP | Age: 74
End: 2024-07-08
Payer: MEDICARE

## 2024-07-08 DIAGNOSIS — J30.9 CHRONIC ALLERGIC RHINITIS: ICD-10-CM

## 2024-07-08 PROCEDURE — 96372 THER/PROPH/DIAG INJ SC/IM: CPT | Performed by: INTERNAL MEDICINE

## 2024-07-08 RX ORDER — TRIAMCINOLONE ACETONIDE 40 MG/ML
40 INJECTION, SUSPENSION INTRA-ARTICULAR; INTRAMUSCULAR ONCE
Status: COMPLETED | OUTPATIENT
Start: 2024-07-08 | End: 2024-07-08

## 2024-07-08 RX ADMIN — TRIAMCINOLONE ACETONIDE 40 MG: 40 INJECTION, SUSPENSION INTRA-ARTICULAR; INTRAMUSCULAR at 08:31

## 2024-10-29 ENCOUNTER — APPOINTMENT (OUTPATIENT)
Dept: MEDICAL GROUP | Age: 74
End: 2024-10-29
Payer: MEDICARE

## 2024-10-30 ENCOUNTER — OFFICE VISIT (OUTPATIENT)
Dept: MEDICAL GROUP | Age: 74
End: 2024-10-30
Payer: MEDICARE

## 2024-10-30 VITALS
OXYGEN SATURATION: 95 % | SYSTOLIC BLOOD PRESSURE: 120 MMHG | WEIGHT: 168 LBS | HEIGHT: 70 IN | DIASTOLIC BLOOD PRESSURE: 64 MMHG | HEART RATE: 99 BPM | BODY MASS INDEX: 24.05 KG/M2 | TEMPERATURE: 98.9 F

## 2024-10-30 DIAGNOSIS — J30.1 SEASONAL ALLERGIC RHINITIS DUE TO POLLEN: ICD-10-CM

## 2024-10-30 DIAGNOSIS — J32.9 RECURRENT SINUS INFECTIONS: ICD-10-CM

## 2024-10-30 DIAGNOSIS — I10 ESSENTIAL HYPERTENSION: ICD-10-CM

## 2024-10-30 RX ORDER — TRIAMCINOLONE ACETONIDE 40 MG/ML
40 INJECTION, SUSPENSION INTRA-ARTICULAR; INTRAMUSCULAR ONCE
Status: COMPLETED | OUTPATIENT
Start: 2024-10-30 | End: 2024-10-30

## 2024-10-30 RX ADMIN — TRIAMCINOLONE ACETONIDE 40 MG: 40 INJECTION, SUSPENSION INTRA-ARTICULAR; INTRAMUSCULAR at 16:04

## 2024-10-30 RX ADMIN — TRIAMCINOLONE ACETONIDE 40 MG: 40 INJECTION, SUSPENSION INTRA-ARTICULAR; INTRAMUSCULAR at 16:05

## 2024-10-30 ASSESSMENT — ENCOUNTER SYMPTOMS
BACK PAIN: 0
HEADACHES: 0
DEPRESSION: 0
BLOOD IN STOOL: 0
WHEEZING: 0
BRUISES/BLEEDS EASILY: 0
DOUBLE VISION: 0
COUGH: 0
ABDOMINAL PAIN: 0
CHILLS: 0
SHORTNESS OF BREATH: 0
ORTHOPNEA: 0
FEVER: 0
BLURRED VISION: 0
WEAKNESS: 0
PALPITATIONS: 0
DIZZINESS: 0

## 2024-10-30 ASSESSMENT — FIBROSIS 4 INDEX: FIB4 SCORE: 1.34

## 2025-02-19 ENCOUNTER — NON-PROVIDER VISIT (OUTPATIENT)
Dept: MEDICAL GROUP | Age: 75
End: 2025-02-19
Payer: MEDICARE

## 2025-02-19 DIAGNOSIS — J30.1 SEASONAL ALLERGIC RHINITIS DUE TO POLLEN: ICD-10-CM

## 2025-02-19 PROCEDURE — 96372 THER/PROPH/DIAG INJ SC/IM: CPT | Performed by: INTERNAL MEDICINE

## 2025-02-19 RX ORDER — TRIAMCINOLONE ACETONIDE 40 MG/ML
40 INJECTION, SUSPENSION INTRA-ARTICULAR; INTRAMUSCULAR ONCE
Status: COMPLETED | OUTPATIENT
Start: 2025-02-19 | End: 2025-02-19

## 2025-02-19 RX ADMIN — TRIAMCINOLONE ACETONIDE 40 MG: 40 INJECTION, SUSPENSION INTRA-ARTICULAR; INTRAMUSCULAR at 08:05

## 2025-02-19 NOTE — PROGRESS NOTES
Mauricio Obando is a 74 y.o. male here for a non-provider visit for Kenalog injection.    Reason for injection: Allergies   Order in MAR?: Yes  Patient supplied?:No  Minimum interval has been met for this injection (per MAR order): Yes    Patient tolerated injection and no adverse effects were observed or reported: Yes    # of Administrations remaining in MAR: 0

## 2025-02-24 DIAGNOSIS — F51.01 PRIMARY INSOMNIA: ICD-10-CM

## 2025-02-25 RX ORDER — TRAZODONE HYDROCHLORIDE 50 MG/1
TABLET ORAL
Qty: 90 TABLET | Refills: 3 | Status: SHIPPED | OUTPATIENT
Start: 2025-02-25

## 2025-04-19 LAB
25(OH)D3+25(OH)D2 SERPL-MCNC: 42.4 NG/ML (ref 30–100)
ALBUMIN SERPL-MCNC: 4.5 G/DL (ref 3.8–4.8)
ALDOST SERPL-MCNC: 2.6 NG/DL
ALDOST/RENIN PLAS-RTO: 0.2 {RATIO}
ALP SERPL-CCNC: 90 IU/L (ref 44–121)
ALT SERPL-CCNC: 20 IU/L (ref 0–44)
AST SERPL-CCNC: 29 IU/L (ref 0–40)
BASOPHILS # BLD AUTO: 0.1 X10E3/UL (ref 0–0.2)
BASOPHILS NFR BLD AUTO: 2 %
BILIRUB SERPL-MCNC: 0.8 MG/DL (ref 0–1.2)
BUN SERPL-MCNC: 8 MG/DL (ref 8–27)
BUN/CREAT SERPL: 11 (ref 10–24)
CALCIUM SERPL-MCNC: 9.8 MG/DL (ref 8.6–10.2)
CHLORIDE SERPL-SCNC: 91 MMOL/L (ref 96–106)
CHOLEST SERPL-MCNC: 173 MG/DL (ref 100–199)
CO2 SERPL-SCNC: 21 MMOL/L (ref 20–29)
CREAT SERPL-MCNC: 0.72 MG/DL (ref 0.76–1.27)
EGFRCR SERPLBLD CKD-EPI 2021: 97 ML/MIN/1.73
EOSINOPHIL # BLD AUTO: 0.2 X10E3/UL (ref 0–0.4)
EOSINOPHIL NFR BLD AUTO: 3 %
ERYTHROCYTE [DISTWIDTH] IN BLOOD BY AUTOMATED COUNT: 13.1 % (ref 11.6–15.4)
GLOBULIN SER CALC-MCNC: 2.6 G/DL (ref 1.5–4.5)
GLUCOSE SERPL-MCNC: 95 MG/DL (ref 70–99)
HBA1C MFR BLD: 5.5 % (ref 4.8–5.6)
HCT VFR BLD AUTO: 46.1 % (ref 37.5–51)
HDLC SERPL-MCNC: 56 MG/DL
HGB BLD-MCNC: 16 G/DL (ref 13–17.7)
IMM GRANULOCYTES # BLD AUTO: 0.2 X10E3/UL (ref 0–0.1)
IMM GRANULOCYTES NFR BLD AUTO: 3 %
IMMATURE CELLS  115398: ABNORMAL
LDL CALC COMMENT:: ABNORMAL
LDLC SERPL CALC-MCNC: 104 MG/DL (ref 0–99)
LYMPHOCYTES # BLD AUTO: 1.8 X10E3/UL (ref 0.7–3.1)
LYMPHOCYTES NFR BLD AUTO: 25 %
MCH RBC QN AUTO: 35 PG (ref 26.6–33)
MCHC RBC AUTO-ENTMCNC: 34.7 G/DL (ref 31.5–35.7)
MCV RBC AUTO: 101 FL (ref 79–97)
MONOCYTES # BLD AUTO: 0.9 X10E3/UL (ref 0.1–0.9)
MONOCYTES NFR BLD AUTO: 12 %
MORPHOLOGY BLD-IMP: ABNORMAL
NEUTROPHILS # BLD AUTO: 4 X10E3/UL (ref 1.4–7)
NEUTROPHILS NFR BLD AUTO: 55 %
NRBC BLD AUTO-RTO: ABNORMAL %
PLATELET # BLD AUTO: 491 X10E3/UL (ref 150–450)
POTASSIUM SERPL-SCNC: 5 MMOL/L (ref 3.5–5.2)
PROT SERPL-MCNC: 7.1 G/DL (ref 6–8.5)
PSA SERPL-MCNC: 0.7 NG/ML (ref 0–4)
RBC # BLD AUTO: 4.57 X10E6/UL (ref 4.14–5.8)
RENIN PLAS-CCNC: 12 NG/ML/HR
SODIUM SERPL-SCNC: 126 MMOL/L (ref 134–144)
TRIGL SERPL-MCNC: 71 MG/DL (ref 0–149)
TSH SERPL DL<=0.005 MIU/L-ACNC: 1.56 UIU/ML (ref 0.45–4.5)
VLDLC SERPL CALC-MCNC: 13 MG/DL (ref 5–40)
WBC # BLD AUTO: 7.3 X10E3/UL (ref 3.4–10.8)

## 2025-04-22 ENCOUNTER — OFFICE VISIT (OUTPATIENT)
Dept: MEDICAL GROUP | Age: 75
End: 2025-04-22
Payer: MEDICARE

## 2025-04-22 VITALS
OXYGEN SATURATION: 95 % | WEIGHT: 168 LBS | TEMPERATURE: 97.7 F | BODY MASS INDEX: 24.05 KG/M2 | HEART RATE: 80 BPM | DIASTOLIC BLOOD PRESSURE: 74 MMHG | SYSTOLIC BLOOD PRESSURE: 130 MMHG | HEIGHT: 70 IN

## 2025-04-22 DIAGNOSIS — Z12.12 SCREENING FOR COLORECTAL CANCER: ICD-10-CM

## 2025-04-22 DIAGNOSIS — J30.2 SEASONAL ALLERGIC RHINITIS, UNSPECIFIED TRIGGER: ICD-10-CM

## 2025-04-22 DIAGNOSIS — N40.0 BPH WITHOUT URINARY OBSTRUCTION: ICD-10-CM

## 2025-04-22 DIAGNOSIS — E78.2 MIXED HYPERLIPIDEMIA: ICD-10-CM

## 2025-04-22 DIAGNOSIS — I10 ESSENTIAL HYPERTENSION: ICD-10-CM

## 2025-04-22 DIAGNOSIS — E55.9 VITAMIN D DEFICIENCY: ICD-10-CM

## 2025-04-22 DIAGNOSIS — J30.1 SEASONAL ALLERGIC RHINITIS DUE TO POLLEN: ICD-10-CM

## 2025-04-22 DIAGNOSIS — Z12.11 SCREENING FOR COLORECTAL CANCER: ICD-10-CM

## 2025-04-22 DIAGNOSIS — L21.9 SEBORRHEIC DERMATITIS OF SCALP: ICD-10-CM

## 2025-04-22 DIAGNOSIS — R73.01 IFG (IMPAIRED FASTING GLUCOSE): ICD-10-CM

## 2025-04-22 DIAGNOSIS — F51.01 PRIMARY INSOMNIA: ICD-10-CM

## 2025-04-22 PROCEDURE — 3075F SYST BP GE 130 - 139MM HG: CPT | Performed by: INTERNAL MEDICINE

## 2025-04-22 PROCEDURE — 3078F DIAST BP <80 MM HG: CPT | Performed by: INTERNAL MEDICINE

## 2025-04-22 PROCEDURE — 99214 OFFICE O/P EST MOD 30 MIN: CPT | Performed by: INTERNAL MEDICINE

## 2025-04-22 RX ORDER — TRIAMCINOLONE ACETONIDE 40 MG/ML
40 INJECTION, SUSPENSION INTRA-ARTICULAR; INTRAMUSCULAR ONCE
Status: COMPLETED | OUTPATIENT
Start: 2025-04-22 | End: 2025-04-22

## 2025-04-22 RX ORDER — TRAZODONE HYDROCHLORIDE 50 MG/1
TABLET ORAL
Qty: 90 TABLET | Refills: 3 | Status: SHIPPED | OUTPATIENT
Start: 2025-04-22

## 2025-04-22 RX ORDER — FLUTICASONE PROPIONATE 50 MCG
2 SPRAY, SUSPENSION (ML) NASAL 2 TIMES DAILY
Qty: 18.2 ML | Refills: 5 | Status: SHIPPED | OUTPATIENT
Start: 2025-04-22 | End: 2025-04-22

## 2025-04-22 RX ORDER — FLUTICASONE PROPIONATE 50 MCG
2 SPRAY, SUSPENSION (ML) NASAL 2 TIMES DAILY
Qty: 18.2 ML | Refills: 5 | Status: SHIPPED | OUTPATIENT
Start: 2025-04-22 | End: 2025-04-22 | Stop reason: SDUPTHER

## 2025-04-22 RX ORDER — LISINOPRIL 20 MG/1
20 TABLET ORAL DAILY
Qty: 90 TABLET | Refills: 2 | Status: SHIPPED | OUTPATIENT
Start: 2025-04-22

## 2025-04-22 RX ORDER — MONTELUKAST SODIUM 10 MG/1
10 TABLET ORAL DAILY
Qty: 90 TABLET | Refills: 0 | Status: SHIPPED | OUTPATIENT
Start: 2025-04-22

## 2025-04-22 RX ORDER — KETOCONAZOLE 20 MG/ML
1 SHAMPOO, SUSPENSION TOPICAL
Qty: 120 ML | Refills: 3 | Status: SHIPPED | OUTPATIENT
Start: 2025-04-22

## 2025-04-22 RX ORDER — LOVASTATIN 40 MG/1
40 TABLET ORAL DAILY
Qty: 90 TABLET | Refills: 2 | Status: SHIPPED | OUTPATIENT
Start: 2025-04-22

## 2025-04-22 RX ADMIN — TRIAMCINOLONE ACETONIDE 40 MG: 40 INJECTION, SUSPENSION INTRA-ARTICULAR; INTRAMUSCULAR at 07:24

## 2025-04-22 ASSESSMENT — ENCOUNTER SYMPTOMS
CARDIOVASCULAR NEGATIVE: 1
PSYCHIATRIC NEGATIVE: 1
NEUROLOGICAL NEGATIVE: 1
RESPIRATORY NEGATIVE: 1
CONSTITUTIONAL NEGATIVE: 1
MUSCULOSKELETAL NEGATIVE: 1
GASTROINTESTINAL NEGATIVE: 1
EYES NEGATIVE: 1

## 2025-04-22 ASSESSMENT — PATIENT HEALTH QUESTIONNAIRE - PHQ9: CLINICAL INTERPRETATION OF PHQ2 SCORE: 0

## 2025-04-22 ASSESSMENT — FIBROSIS 4 INDEX: FIB4 SCORE: 0.95

## 2025-04-22 NOTE — ASSESSMENT & PLAN NOTE
Orders:    lovastatin (MEVACOR) 40 MG tablet; Take 1 Tablet by mouth every day.    Lipid Profile; Future    TSH; Future    CBC WITH DIFFERENTIAL; Future    Comp Metabolic Panel; Future

## 2025-04-22 NOTE — PROGRESS NOTES
Subjective     Mauricio Obando is a 74 y.o. male who presents with Follow-Up (Lab review kenalog shot )    History of Present Illness  The patient is a 74-year-old male who presents for evaluation of hypertension, hyperlipidemia, allergies, and skin issues.    Allergies  An exacerbation of allergies has been noted, attributed to the current season. Relief from allergy symptoms is achieved through the use of injections. Flonase has not been used recently, and it is unclear whether it was over-the-counter or prescription.  - Onset: Exacerbation noted in the current season.  - Alleviating Factors: Relief achieved through the use of injections.  - Severity: Flonase has not been used recently.    Hypertension  Blood pressure is occasionally monitored at home, typically registering around 145/75. Current management includes lisinopril 20 mg.  - Character: Blood pressure typically around 145/75.  - Alleviating Factors: Managed with lisinopril 20 mg.    Skin Issues  Daily use of ketoconazole shampoo is reported for a skin condition, and ongoing care with a dermatologist is maintained. Synalar ointment is also used daily.  - Character: Skin condition managed with ketoconazole shampoo and Synalar ointment.  - Timing: Daily use of ketoconazole shampoo and Synalar ointment.    Hyperlipidemia  Lovastatin is taken for cholesterol management.  - Alleviating Factors: Managed with lovastatin.    Other Medications  Trazodone is taken nightly, and over-the-counter vitamin B12 supplements are used.    Patient Active Problem List   Diagnosis    Mixed hyperlipidemia    Primary insomnia    Anxiety    Vitamin B 12 deficiency    Essential hypertension     Seasonal allergic rhinitis due to pollen     ./enc  Outpatient Medications Prior to Visit   Medication Sig Dispense Refill    fluocinolone (SYNALAR) 0.025 % ointment       cyanocobalamin (VITAMIN B12) 1000 MCG Tab Take 1 Tablet by mouth every day. 100 Tablet 4    traZODone (DESYREL) 50  MG Tab TAKE 1 TABLET EVERY NIGHT AT BEDTIME 90 Tablet 3    fluticasone (FLONASE) 50 MCG/ACT nasal spray Administer 2 Sprays into affected nostril(S) 2 times a day. 18.2 mL 5    montelukast (SINGULAIR) 10 MG Tab Take 1 Tablet by mouth every day. 90 Tablet 0    cephALEXin (KEFLEX) 500 MG Cap Take 1 Capsule by mouth 4 times a day as needed (recurrent sinus infections). 90 Capsule 1    lisinopril (PRINIVIL) 20 MG Tab Take 1 Tablet by mouth every day. 90 Tablet 2    lovastatin (MEVACOR) 40 MG tablet Take 1 Tablet by mouth every day. 90 Tablet 2    ketoconazole (NIZORAL) 2 % shampoo Apply  topically 1 time a day as needed.      predniSONE (DELTASONE) 20 MG Tab Take 3 tabs po for 2 days then 2 tabs for 2 days then 1 for 2 days 12 Tablet 0     No facility-administered medications prior to visit.     Orders Only on 04/11/2025   Component Date Value    WBC 04/11/2025 7.3     RBC 04/11/2025 4.57     Hemoglobin 04/11/2025 16.0     Hematocrit 04/11/2025 46.1     MCV 04/11/2025 101 (H)     MCH 04/11/2025 35.0 (H)     MCHC 04/11/2025 34.7     RDW 04/11/2025 13.1     Platelet Count 04/11/2025 491 (H)     Neutrophils-Polys 04/11/2025 55     Lymphocytes 04/11/2025 25     Monocytes 04/11/2025 12     Eosinophils 04/11/2025 3     Basophils 04/11/2025 2     Immature Cells 04/11/2025 CANCELED     Neutrophils (Absolute) 04/11/2025 4.0     Lymphs (Absolute) 04/11/2025 1.8     Monos (Absolute) 04/11/2025 0.9     Eos (Absolute) 04/11/2025 0.2     Baso (Absolute) 04/11/2025 0.1     Immature Granulocytes 04/11/2025 3     Immature Granulocytes (a* 04/11/2025 0.2 (H)     Nucleated RBC 04/11/2025 CANCELED     Comments-Diff 04/11/2025 CANCELED     Glucose 04/11/2025 95     Bun 04/11/2025 8     Creatinine 04/11/2025 0.72 (L)     eGFR 04/11/2025 97     Bun-Creatinine Ratio 04/11/2025 11     Sodium 04/11/2025 126 (L)     Potassium 04/11/2025 5.0     Chloride 04/11/2025 91 (L)     Co2 04/11/2025 21     Calcium 04/11/2025 9.8     Total Protein  04/11/2025 7.1     Albumin 04/11/2025 4.5     Globulin 04/11/2025 2.6     Total Bilirubin 04/11/2025 0.8     Alkaline Phosphatase 04/11/2025 90     AST(SGOT) 04/11/2025 29     ALT(SGPT) 04/11/2025 20     Cholesterol,Tot 04/11/2025 173     Triglycerides 04/11/2025 71     HDL 04/11/2025 56     VLDL Cholesterol Calc 04/11/2025 13     LDL Chol Calc (NIH) 04/11/2025 104 (H)     LDL Calc Comment: 04/11/2025 CANCELED     Aldosterone 04/11/2025 2.6     Renin Activity, Plasma 04/11/2025 12 (H)     Aldos/Renin Ratio 04/11/2025 0.2     Glycohemoglobin 04/11/2025 5.5     TSH 04/11/2025 1.560     Prostatic Specific Antig* 04/11/2025 0.7     25-Hydroxy   Vitamin D 25 04/11/2025 42.4       Lab Results   Component Value Date/Time    HBA1C 5.5 04/11/2025 07:25 AM    HBA1C 5.6 05/14/2024 05:49 AM     Lab Results   Component Value Date/Time    SODIUM 126 (L) 04/11/2025 07:25 AM    SODIUM 128 (L) 04/28/2017 11:01 AM    POTASSIUM 5.0 04/11/2025 07:25 AM    POTASSIUM 4.1 04/28/2017 11:01 AM    CHLORIDE 91 (L) 04/11/2025 07:25 AM    CHLORIDE 94 (L) 04/28/2017 11:01 AM    CO2 21 04/11/2025 07:25 AM    CO2 26 04/28/2017 11:01 AM    GLUCOSE 95 04/11/2025 07:25 AM    GLUCOSE 83 04/28/2017 11:01 AM    BUN 8 04/11/2025 07:25 AM    BUN 15 04/28/2017 11:01 AM    CREATININE 0.72 (L) 04/11/2025 07:25 AM    CREATININE 0.63 04/28/2017 11:01 AM    CREATININE 0.88 01/24/2013 09:31 AM    BUNCREATRAT 11 04/11/2025 07:25 AM    BUNCREATRAT 11 01/24/2013 09:31 AM    ALKPHOSPHAT 90 04/11/2025 07:25 AM    ALKPHOSPHAT 82 04/27/2017 03:30 AM    ASTSGOT 29 04/11/2025 07:25 AM    ASTSGOT 37 04/27/2017 03:30 AM    ALTSGPT 20 04/11/2025 07:25 AM    ALTSGPT 48 04/27/2017 03:30 AM    TBILIRUBIN 0.8 04/11/2025 07:25 AM    TBILIRUBIN 1.3 04/27/2017 03:30 AM     Lab Results   Component Value Date/Time    INR 0.86 (L) 04/26/2017 12:35 PM     Lab Results   Component Value Date/Time    CHOLSTRLTOT 173 04/11/2025 07:25 AM    CHOLSTRLTOT 199 01/24/2013 09:31 AM    LDL 81  "07/08/2020 06:36 AM     (H) 01/24/2013 09:31 AM    HDL 56 04/11/2025 07:25 AM    HDL 57 01/24/2013 09:31 AM    TRIGLYCERIDE 71 04/11/2025 07:25 AM    TRIGLYCERIDE 111 01/24/2013 09:31 AM       No results found for: \"TESTOSTERONE\"  Lab Results   Component Value Date/Time    TSH 1.560 04/11/2025 07:25 AM    TSH 1.420 05/14/2024 05:49 AM     No results found for: \"FREET4\"  No results found for: \"URICACID\"  No components found for: \"VITB12\"  Lab Results   Component Value Date/Time    25HYDROXY 42.4 04/11/2025 07:25 AM    25HYDROXY 42.4 05/14/2024 05:49 AM             HPI    Review of Systems   Constitutional: Negative.    HENT:  Positive for congestion.    Eyes: Negative.    Respiratory: Negative.     Cardiovascular: Negative.    Gastrointestinal: Negative.    Genitourinary: Negative.    Musculoskeletal: Negative.    Skin: Negative.    Neurological: Negative.    Endo/Heme/Allergies: Negative.    Psychiatric/Behavioral: Negative.                Objective     /78 (BP Location: Right arm, Patient Position: Sitting, BP Cuff Size: Adult)   Pulse 87   Temp 36.5 °C (97.7 °F) (Temporal)   Ht 1.778 m (5' 10\")   Wt 76.2 kg (168 lb)   SpO2 95%   BMI 24.11 kg/m²      Physical Exam  Constitutional:       General: He is not in acute distress.     Appearance: He is well-developed. He is not diaphoretic.   HENT:      Head: Normocephalic and atraumatic.      Right Ear: External ear normal.      Left Ear: External ear normal.      Nose: Nose normal.      Mouth/Throat:      Pharynx: No oropharyngeal exudate.   Eyes:      General: No scleral icterus.        Right eye: No discharge.         Left eye: No discharge.      Conjunctiva/sclera: Conjunctivae normal.      Pupils: Pupils are equal, round, and reactive to light.   Neck:      Thyroid: No thyromegaly.      Vascular: No JVD.      Trachea: No tracheal deviation.   Cardiovascular:      Rate and Rhythm: Normal rate and regular rhythm.      Heart sounds: Normal heart " sounds. No murmur heard.     No friction rub. No gallop.   Pulmonary:      Effort: Pulmonary effort is normal. No respiratory distress.      Breath sounds: Normal breath sounds. No stridor. No wheezing or rales.   Chest:      Chest wall: No tenderness.   Abdominal:      General: Bowel sounds are normal. There is no distension.      Palpations: Abdomen is soft. There is no mass.      Tenderness: There is no abdominal tenderness. There is no guarding or rebound.   Musculoskeletal:         General: No tenderness. Normal range of motion.      Cervical back: Normal range of motion and neck supple.   Lymphadenopathy:      Cervical: No cervical adenopathy.   Skin:     General: Skin is warm and dry.      Coloration: Skin is not pale.      Findings: No erythema or rash.   Neurological:      Mental Status: He is alert and oriented to person, place, and time.      Motor: No abnormal muscle tone.      Coordination: Coordination normal.      Deep Tendon Reflexes: Reflexes are normal and symmetric. Reflexes normal.   Psychiatric:         Behavior: Behavior normal.         Thought Content: Thought content normal.         Judgment: Judgment normal.                         1. Seasonal allergic rhinitis due to pollen   Under good control. Continue same regimen.    - triamcinolone acetonide (Kenalog-40) injection 40 mg    2. Primary insomnia    Under good control. Continue same regimen.  - traZODone (DESYREL) 50 MG Tab; TAKE 1 TABLET EVERY NIGHT AT BEDTIME  Dispense: 90 Tablet; Refill: 3    3. Essential hypertension  Under good control. Continue same regimen.     - lisinopril (PRINIVIL) 20 MG Tab; Take 1 Tablet by mouth every day.  Dispense: 90 Tablet; Refill: 2    4. Mixed hyperlipidemia Under good control. Continue same regimen.     - lovastatin (MEVACOR) 40 MG tablet; Take 1 Tablet by mouth every day.  Dispense: 90 Tablet; Refill: 2  - Lipid Profile; Future  - TSH; Future  - CBC WITH DIFFERENTIAL; Future  - Comp Metabolic Panel;  Future    5. Seasonal allergic rhinitis, unspecified trigger     Under good control. Continue same regimen.  - montelukast (SINGULAIR) 10 MG Tab; Take 1 Tablet by mouth every day.  Dispense: 90 Tablet; Refill: 0    6. IFG (impaired fasting glucose) Under good control. Continue same regimen.  Monitoring diet and exercise  - HEMOGLOBIN A1C; Future    7. BPH without urinary obstruction     - PROSTATE SPECIFIC AG DIAGNOSTIC; Future    8. Vitamin D deficiency    Under good control. Continue same regimen.  2000 units daily OTC vitamin D3  - VITAMIN D,25 HYDROXY (DEFICIENCY); Future    9. Screening for colorectal cancer  10 years since last colonoscopy which showed a small sessile polyp less than 4 mm.  Referral sent.  [facility]  - Referral to GI for Colonoscopy    10. Seborrheic dermatitis of scalp     - ketoconazole (NIZORAL) 2 % shampoo; Apply 1 mL topically 1 time a day as needed for Itching (to scalp).  Dispense: 120 mL; Refill: 3      Assessment & Plan  1. Hypertension.  - Blood pressure readings have been consistently <130 at home, but today's reading was slightly elevated at 142.  - Physical exam findings include heart sounds that are normal.  - Advised to maintain a low-salt diet and limit alcohol consumption to no more than one drink per day.  - Continue current medication regimen of lisinopril 20 mg, with a prescription refill sent to [facility].    2. Hyperlipidemia.  - Cholesterol management is ongoing.  - Prescription refill for lovastatin sent to [facility].  - No new physical exam findings or test results discussed.  - Continue current medication regimen of lovastatin.    3. Allergies.  - Allergies are acting up, especially this time of year.  - Continue using montelukast for allergy management.  - Flonase has not been used in a while and does not need a refill.  - No new physical exam findings or test results discussed.    4. Skin issues.  - Uses ketoconazole shampoo daily for skin  issues.  - Prescription for ketoconazole shampoo will be sent to [facility].  - Synalar ointment is used as needed; advised to switch to over-the-counter hydrocortisone cream if necessary.  - No new physical exam findings or test results discussed.    5. Health maintenance.  - Laboratory results are within normal limits.  - It has been 10 years since the last colonoscopy, during which a small polyp was identified.  - Colonoscopy has been ordered.  - Up to date with COVID-19, pneumonia, shingles, and RSV vaccines. Due for a tetanus vaccine next year, with the last dose administered in 2016.         Assessment & Plan  Seasonal allergic rhinitis due to pollen    Orders:    triamcinolone acetonide (Kenalog-40) injection 40 mg    Primary insomnia    Orders:    traZODone (DESYREL) 50 MG Tab; TAKE 1 TABLET EVERY NIGHT AT BEDTIME    Essential hypertension     Orders:    lisinopril (PRINIVIL) 20 MG Tab; Take 1 Tablet by mouth every day.    Mixed hyperlipidemia    Orders:    lovastatin (MEVACOR) 40 MG tablet; Take 1 Tablet by mouth every day.    Lipid Profile; Future    TSH; Future    CBC WITH DIFFERENTIAL; Future    Comp Metabolic Panel; Future    Seasonal allergic rhinitis, unspecified trigger    Orders:    montelukast (SINGULAIR) 10 MG Tab; Take 1 Tablet by mouth every day.    IFG (impaired fasting glucose)    Orders:    HEMOGLOBIN A1C; Future    BPH without urinary obstruction    Orders:    PROSTATE SPECIFIC AG DIAGNOSTIC; Future    Vitamin D deficiency    Orders:    VITAMIN D,25 HYDROXY (DEFICIENCY); Future    Screening for colorectal cancer    Orders:    Referral to GI for Colonoscopy    Seborrheic dermatitis of scalp    Orders:    ketoconazole (NIZORAL) 2 % shampoo; Apply 1 mL topically 1 time a day as needed for Itching (to scalp).

## 2025-08-15 ENCOUNTER — OFFICE VISIT (OUTPATIENT)
Dept: MEDICAL GROUP | Age: 75
End: 2025-08-15
Payer: MEDICARE

## 2025-08-15 VITALS
BODY MASS INDEX: 25.2 KG/M2 | DIASTOLIC BLOOD PRESSURE: 76 MMHG | RESPIRATION RATE: 16 BRPM | HEART RATE: 86 BPM | TEMPERATURE: 98.4 F | OXYGEN SATURATION: 94 % | HEIGHT: 70 IN | WEIGHT: 176 LBS | SYSTOLIC BLOOD PRESSURE: 126 MMHG

## 2025-08-15 DIAGNOSIS — J30.1 SEASONAL ALLERGIC RHINITIS DUE TO POLLEN: Primary | ICD-10-CM

## 2025-08-15 PROCEDURE — 3074F SYST BP LT 130 MM HG: CPT | Performed by: FAMILY MEDICINE

## 2025-08-15 PROCEDURE — 3078F DIAST BP <80 MM HG: CPT | Performed by: FAMILY MEDICINE

## 2025-08-15 PROCEDURE — 99214 OFFICE O/P EST MOD 30 MIN: CPT | Performed by: FAMILY MEDICINE

## 2025-08-15 RX ORDER — TRIAMCINOLONE ACETONIDE 40 MG/ML
40 INJECTION, SUSPENSION INTRA-ARTICULAR; INTRAMUSCULAR ONCE
Status: COMPLETED | OUTPATIENT
Start: 2025-08-15 | End: 2025-08-15

## 2025-08-15 RX ORDER — LORATADINE 10 MG/1
10 TABLET ORAL DAILY
Qty: 90 TABLET | Refills: 2 | Status: SHIPPED | OUTPATIENT
Start: 2025-08-15

## 2025-08-15 RX ADMIN — TRIAMCINOLONE ACETONIDE 40 MG: 40 INJECTION, SUSPENSION INTRA-ARTICULAR; INTRAMUSCULAR at 11:09

## 2025-08-15 RX ADMIN — TRIAMCINOLONE ACETONIDE 40 MG: 40 INJECTION, SUSPENSION INTRA-ARTICULAR; INTRAMUSCULAR at 11:10

## 2025-08-15 ASSESSMENT — FIBROSIS 4 INDEX: FIB4 SCORE: 0.96
